# Patient Record
Sex: MALE | Race: WHITE | Employment: OTHER | RURAL
[De-identification: names, ages, dates, MRNs, and addresses within clinical notes are randomized per-mention and may not be internally consistent; named-entity substitution may affect disease eponyms.]

---

## 2017-01-12 RX ORDER — CARVEDILOL 3.12 MG/1
TABLET ORAL
Qty: 180 TAB | Refills: 3 | Status: SHIPPED | OUTPATIENT
Start: 2017-01-12 | End: 2018-02-13 | Stop reason: SDUPTHER

## 2017-01-12 RX ORDER — TRANDOLAPRIL 2 MG/1
TABLET ORAL
Qty: 90 TAB | Refills: 3 | Status: SHIPPED | OUTPATIENT
Start: 2017-01-12 | End: 2018-02-06 | Stop reason: SDUPTHER

## 2017-02-13 RX ORDER — SIMVASTATIN 20 MG/1
TABLET, FILM COATED ORAL
Qty: 90 TAB | Refills: 2 | Status: SHIPPED | OUTPATIENT
Start: 2017-02-13 | End: 2017-11-10 | Stop reason: SDUPTHER

## 2017-04-21 RX ORDER — LANCETS 28 GAUGE
EACH MISCELLANEOUS
Qty: 100 LANCET | Refills: 4 | Status: SHIPPED | OUTPATIENT
Start: 2017-04-21

## 2017-08-14 ENCOUNTER — OFFICE VISIT (OUTPATIENT)
Dept: FAMILY MEDICINE CLINIC | Age: 77
End: 2017-08-14

## 2017-08-14 VITALS
HEART RATE: 60 BPM | RESPIRATION RATE: 16 BRPM | TEMPERATURE: 98.2 F | DIASTOLIC BLOOD PRESSURE: 63 MMHG | HEIGHT: 66 IN | BODY MASS INDEX: 29.99 KG/M2 | SYSTOLIC BLOOD PRESSURE: 130 MMHG | OXYGEN SATURATION: 97 % | WEIGHT: 186.6 LBS

## 2017-08-14 DIAGNOSIS — E78.00 PURE HYPERCHOLESTEROLEMIA: Chronic | ICD-10-CM

## 2017-08-14 DIAGNOSIS — I25.5 ISCHEMIC CARDIOMYOPATHY: Chronic | ICD-10-CM

## 2017-08-14 DIAGNOSIS — E03.9 ACQUIRED HYPOTHYROIDISM: Chronic | ICD-10-CM

## 2017-08-14 DIAGNOSIS — E11.9 TYPE 2 DIABETES MELLITUS WITHOUT COMPLICATION, WITHOUT LONG-TERM CURRENT USE OF INSULIN (HCC): Primary | Chronic | ICD-10-CM

## 2017-08-14 DIAGNOSIS — E08.3293 MILD NONPROLIFERATIVE DIABETIC RETINOPATHY OF BOTH EYES WITHOUT MACULAR EDEMA ASSOCIATED WITH DIABETES MELLITUS DUE TO UNDERLYING CONDITION (HCC): Chronic | ICD-10-CM

## 2017-08-14 NOTE — PROGRESS NOTES
Chief Complaint   Patient presents with    Labs     Fasting    Hypertension    Thyroid Problem     Body mass index is 30.12 kg/(m^2). Reviewed record in preparation for visit and have necessary documentation  Pt did not bring medication to office visit for review  Information was given to pt on Advanced Directives, Living Will  Information was given on Shingles Vaccine  Opportunity was given for questions  Goals that were addressed and/or need to be completed after this appointment include:     Health Maintenance Due   Topic Date Due    FOOT EXAM Q1  04/20/2017    HEMOGLOBIN A1C Q6M  04/26/2017    INFLUENZA AGE 9 TO ADULT  08/01/2017     - check for functional glucose monitor and record keeping system: Yes, checks sugars once daily  Pt was given BS record log to document home readings and return to office for review  Diabetic Bundle:  LDL-71  A1C-5.8  BP-130/63  Smoking? No  Anticoagulation medication? Yes  Eye exam dilated?  Good  Foot exam? Due

## 2017-08-14 NOTE — MR AVS SNAPSHOT
Visit Information Date & Time Provider Department Dept. Phone Encounter #  
 8/14/2017 10:10 AM Wilberto Low MD Joanne Vitale [id number] Follow-up Instructions Return in about 4 months (around 12/14/2017). Your Appointments 8/14/2017 10:10 AM  
ROUTINE CARE with Wilberto Low MD  
[address]) Appt Note: 6 mo f/u  
 [address] [place] [id number]  
[phone]  
  
   
 [address] [place] [address]  
  
    
 11/28/2017  1:25 PM  
Medicare Physical with Wilberto Low MD  
[facility] ([address]) Appt Note: -letter mailed [address] [place] [id number]  
[place] [address] [place] [id number] Upcoming Health Maintenance Date Due  
 FOOT EXAM Q1 4/20/2017 HEMOGLOBIN A1C Q6M 4/26/2017 INFLUENZA AGE 9 TO ADULT 8/1/2017 MICROALBUMIN Q1 10/26/2017 LIPID PANEL Q1 10/26/2017 MEDICARE YEARLY EXAM 11/19/2017 EYE EXAM RETINAL OR DILATED Q1 12/19/2017 GLAUCOMA SCREENING Q2Y 12/19/2018 DTaP/Tdap/Td series (2 - Td) 5/17/2021 COLONOSCOPY 12/12/2021 Allergies as of 8/14/2017  Review Complete On: 8/14/2017 By: Autumn Scales, LPN Severity Noted Reaction Type Reactions Pcn [Penicillins]  04/05/2010    Rash, Swelling Robitussin [Guaifenesin]  04/05/2010    Hives Current Immunizations  Reviewed on 12/15/2015 Name Date H1N1 FLU VACCINE 1/20/2010 Influenza Vaccine 10/9/2015, 11/17/2014, 9/23/2013 Influenza Vaccine (Quad) PF 9/26/2016 Influenza Vaccine Split 11/2/2012, 10/25/2011, 12/8/2009 Pneumococcal Conjugate (PCV-13) 8/13/2015 TD Vaccine 8/17/2001 TDAP Vaccine 5/17/2011 ZZZ-RETIRED (DO NOT USE) Pneumococcal Vaccine (Unspecified Type) 12/5/2007, 12/4/1996 Zoster Vaccine, Live 7/26/2013 Not reviewed this visit You Were Diagnosed With   
  
 Codes Comments Type 2 diabetes mellitus without complication, without long-term current use of insulin (HCC)    -  Primary ICD-10-CM: E11.9 ICD-9-CM: 250.00 Mild nonproliferative diabetic retinopathy of both eyes without macular edema associated with diabetes mellitus due to underlying condition ([facility]     ICD-10-CM: H07.3440 ICD-9-CM: 249.50, 362.04 Acquired hypothyroidism     ICD-10-CM: E03.9 ICD-9-CM: 244.9 Pure hypercholesterolemia     ICD-10-CM: E78.00 ICD-9-CM: 272.0 Ischemic cardiomyopathy     ICD-10-CM: I25.5 ICD-9-CM: 414.8 Vitals BP Pulse Temp Resp Height(growth percentile) Weight(growth percentile) 130/63 (BP 1 Location: Left arm, BP Patient Position: Sitting) 60 98.2 °F (36.8 °C) (Oral) 16 5' 6\" (1.676 m) 186 lb 9.6 oz (84.6 kg) SpO2 BMI Smoking Status 97% 30.12 kg/m2 Never Smoker Vitals History BMI and BSA Data Body Mass Index Body Surface Area  
 30.12 kg/m 2 1.98 m 2 Preferred Pharmacy Pharmacy Name Phone [address] [facility] [phone] Your Updated Medication List  
  
   
This list is accurate as of: 8/14/17  9:37 AM.  Always use your most recent med list.  
  
  
  
  
 aspirin delayed-release 81 mg tablet Take  by mouth daily. B COMPLEX PO Take  by mouth. carvedilol 3.125 mg tablet Commonly known as:  COREG  
TAKE 1 TABLET TWICE A DAY WITH MEALS FISH OIL 1,200-144-216 mg Cap Generic drug:  fish oil-dha-epa Take  by mouth. FREESTYLE LANCETS 28 gauge Misc Generic drug:  lancets USE TO TEST DAILY  
  
 glucose blood VI test strips strip Commonly known as:  FREESTYLE LITE STRIPS Dx E11.9  testing once a day  
  
 levothyroxine 50 mcg tablet Commonly known as:  SYNTHROID  
TAKE 1 TABLET DAILY BEFORE BREAKFAST  
  
 M-VIT PO  
 Take  by mouth. simvastatin 20 mg tablet Commonly known as:  ZOCOR  
TAKE 1 TABLET NIGHTLY  
  
 trandolapril 2 mg tablet Commonly known as:  MAVIK  
TAKE 1 TABLET NIGHTLY FOR LEFT VENTRICULAR DYSFUNCTION FOLLOWING MI  
  
 VITAMIN D3 1,000 unit Cap Generic drug:  cholecalciferol Take  by mouth daily. We Performed the Following HEMOGLOBIN A1C WITH EAG [14112 CPT(R)] HEMOGLOBIN [id number] CPT(R)]  DIABETES FOOT EXAM [HM7 Custom] LIPID PANEL [47496 CPT(R)] METABOLIC PANEL, COMPREHENSIVE [82045 CPT(R)] MICROALBUMIN, UR, RAND W/ MICROALBUMIN/CREA RATIO M5909796 CPT(R)] T4, FREE B4919961 CPT(R)] TSH 3RD GENERATION [96994 CPT(R)] Follow-up Instructions Return in about 4 months (around 12/14/2017). Patient Instructions Learning About Diabetes Food Guidelines Your Care Instructions Meal planning is important to manage diabetes. It helps keep your blood sugar at a target level (which you set with your doctor). You don't have to eat special foods. You can eat what your family eats, including sweets once in a while. But you do have to pay attention to how often you eat and how much you eat of certain foods. You may want to work with a dietitian or a certified diabetes educator (CDE) to help you plan meals and snacks. A dietitian or CDE can also help you lose weight if that is one of your goals. What should you know about eating carbs? Managing the amount of carbohydrate (carbs) you eat is an important part of healthy meals when you have diabetes. Carbohydrate is found in many foods. · Learn which foods have carbs. And learn the amounts of carbs in different foods. ¨ Bread, cereal, pasta, and rice have about 15 grams of carbs in a serving. A serving is 1 slice of bread (1 ounce), ½ cup of cooked cereal, or 1/3 cup of cooked pasta or rice. ¨ Fruits have 15 grams of carbs in a serving.  A serving is 1 small fresh fruit, such as an apple or orange; ½ of a banana; ½ cup of cooked or canned fruit; ½ cup of fruit juice; 1 cup of melon or raspberries; or 2 tablespoons of dried fruit. ¨ Milk and no-sugar-added yogurt have 15 grams of carbs in a serving. A serving is 1 cup of milk or 2/3 cup of no-sugar-added yogurt. ¨ Starchy vegetables have 15 grams of carbs in a serving. A serving is ½ cup of mashed potatoes or sweet potato; 1 cup winter squash; ½ of a small baked potato; ½ cup of cooked beans; or ½ cup cooked corn or green peas. · Learn how much carbs to eat each day and at each meal. A dietitian or CDE can teach you how to keep track of the amount of carbs you eat. This is called carbohydrate counting. · If you are not sure how to count carbohydrate grams, use the Plate Method to plan meals. It is a good, quick way to make sure that you have a balanced meal. It also helps you spread carbs throughout the day. ¨ Divide your plate by types of foods. Put non-starchy vegetables on half the plate, meat or other protein food on one-quarter of the plate, and a grain or starchy vegetable in the final quarter of the plate. To this you can add a small piece of fruit and 1 cup of milk or yogurt, depending on how many carbs you are supposed to eat at a meal. 
· Try to eat about the same amount of carbs at each meal. Do not \"save up\" your daily allowance of carbs to eat at one meal. 
· Proteins have very little or no carbs per serving. Examples of proteins are beef, chicken, turkey, fish, eggs, tofu, cheese, cottage cheese, and peanut butter. A serving size of meat is 3 ounces, which is about the size of a deck of cards. Examples of meat substitute serving sizes (equal to 1 ounce of meat) are 1/4 cup of cottage cheese, 1 egg, 1 tablespoon of peanut butter, and ½ cup of tofu. How can you eat out and still eat healthy? · Learn to estimate the serving sizes of foods that have carbohydrate.  If you measure food at home, it will be easier to estimate the amount in a serving of restaurant food. · If the meal you order has too much carbohydrate (such as potatoes, corn, or baked beans), ask to have a low-carbohydrate food instead. Ask for a salad or green vegetables. · If you use insulin, check your blood sugar before and after eating out to help you plan how much to eat in the future. · If you eat more carbohydrate at a meal than you had planned, take a walk or do other exercise. This will help lower your blood sugar. What else should you know? · Limit saturated fat, such as the fat from meat and dairy products. This is a healthy choice because people who have diabetes are at higher risk of heart disease. So choose lean cuts of meat and nonfat or low-fat dairy products. Use olive or canola oil instead of butter or shortening when cooking. · Don't skip meals. Your blood sugar may drop too low if you skip meals and take insulin or certain medicines for diabetes. · Check with your doctor before you drink alcohol. Alcohol can cause your blood sugar to drop too low. Alcohol can also cause a bad reaction if you take certain diabetes medicines. Follow-up care is a key part of your treatment and safety. Be sure to make and go to all appointments, and call your doctor if you are having problems. It's also a good idea to know your test results and keep a list of the medicines you take. Where can you learn more? Go to http://mabel-maliha.info/. Enter Z669 in the search box to learn more about \"Learning About Diabetes Food Guidelines. \" Current as of: March 13, 2017 Content Version: 11.3 © 1057-4889 [employer]wise, [employer]orporated. Care instructions adapted under license by [employer] (which disclaims liability or warranty for this information).  If you have questions about a medical condition or this instruction, always ask your healthcare professional. [employer] disclaims any warranty or liability for your use of this information. Introducing [facility]! Franco Candelaria introduces [employer] patient portal. Now you can access parts of your medical record, email your doctor's office, and request medication refills online. 1. In your internet browser, go to [email or web address] 2. Click on the First Time User? Click Here link in the Sign In box. You will see the New Member Sign Up page. 3. Enter your [employer] Access Code exactly as it appears below. You will not need to use this code after youve completed the sign-up process. If you do not sign up before the expiration date, you must request a new code. · [employer] Access Code: [email or web address] Expires: 11/12/2017  9:30 AM 
 
4. Enter the last four digits of your Social Security Number (xxxx) and Date of Birth (mm/dd/yyyy) as indicated and click Submit. You will be taken to the next sign-up page. 5. Create a [employer] ID. This will be your [employer] login ID and cannot be changed, so think of one that is secure and easy to remember. 6. Create a [employer] password. You can change your password at any time. 7. Enter your Password Reset Question and Answer. This can be used at a later time if you forget your password. 8. Enter your e-mail address. You will receive e-mail notification when new information is available in [address] 9. Click Sign Up. You can now view and download portions of your medical record. 10. Click the Download Summary menu link to download a portable copy of your medical information. If you have questions, please visit the Frequently Asked Questions section of the [employer] website. Remember, [employer] is NOT to be used for urgent needs. For medical emergencies, dial 911. Now available from your iPhone and Android! Please provide this summary of care documentation to your next provider. Your primary care clinician is listed as [address]. If you have any questions after today's visit, please call [phone].

## 2017-08-14 NOTE — PATIENT INSTRUCTIONS

## 2017-08-15 LAB
ALBUMIN SERPL-MCNC: 4.8 G/DL (ref 3.5–4.8)
ALBUMIN/CREAT UR: 5.7 MG/G CREAT (ref 0–30)
ALBUMIN/GLOB SERPL: 2.1 {RATIO} (ref 1.2–2.2)
ALP SERPL-CCNC: 51 IU/L (ref 39–117)
ALT SERPL-CCNC: 18 IU/L (ref 0–44)
AST SERPL-CCNC: 22 IU/L (ref 0–40)
BILIRUB SERPL-MCNC: 0.8 MG/DL (ref 0–1.2)
BUN SERPL-MCNC: 14 MG/DL (ref 8–27)
BUN/CREAT SERPL: 14 (ref 10–24)
CALCIUM SERPL-MCNC: 9.7 MG/DL (ref 8.6–10.2)
CHLORIDE SERPL-SCNC: 99 MMOL/L (ref 96–106)
CHOLEST SERPL-MCNC: 126 MG/DL (ref 100–199)
CO2 SERPL-SCNC: 25 MMOL/L (ref 18–29)
CREAT SERPL-MCNC: 0.98 MG/DL (ref 0.76–1.27)
CREAT UR-MCNC: 94.3 MG/DL
EST. AVERAGE GLUCOSE BLD GHB EST-MCNC: 128 MG/DL
GLOBULIN SER CALC-MCNC: 2.3 G/DL (ref 1.5–4.5)
GLUCOSE SERPL-MCNC: 107 MG/DL (ref 65–99)
HBA1C MFR BLD: 6.1 % (ref 4.8–5.6)
HDLC SERPL-MCNC: 46 MG/DL
HGB BLD-MCNC: 15.1 G/DL (ref 12.6–17.7)
LDLC SERPL CALC-MCNC: 64 MG/DL (ref 0–99)
Lab: NORMAL
MICROALBUMIN UR-MCNC: 5.4 UG/ML
POTASSIUM SERPL-SCNC: 4.5 MMOL/L (ref 3.5–5.2)
PROT SERPL-MCNC: 7.1 G/DL (ref 6–8.5)
SODIUM SERPL-SCNC: 140 MMOL/L (ref 134–144)
T4 FREE SERPL-MCNC: 1.34 NG/DL (ref 0.82–1.77)
TRIGL SERPL-MCNC: 78 MG/DL (ref 0–149)
TSH SERPL DL<=0.005 MIU/L-ACNC: 2.22 UIU/ML (ref 0.45–4.5)
VLDLC SERPL CALC-MCNC: 16 MG/DL (ref 5–40)

## 2017-08-15 NOTE — PROGRESS NOTES
Progress Note    Patient: Yaw Brush MRN: [id number]  SSN: [id number]    YOB: 1940  Age: 77 y.o. Sex: male        Chief Complaint   Patient presents with    Labs     Fasting    Hypertension    Thyroid Problem         Subjective:     Encounter Diagnoses   Name Primary?  Type 2 diabetes mellitus without complication, without long-term current use of insulin ([facility]): This patient is managed under a comprehensive plan of care for Diabetes. Overall the patient feels well with good energy level. Pertinent Labs:   Lab Results   Component Value Date/Time    Hemoglobin A1c 5.8 10/26/2016 09:34 AM    Hemoglobin A1c 5.8 04/20/2016 08:41 AM    Hemoglobin A1c 5.8 12/15/2015 11:58 AM      Body mass index is 30.12 kg/(m^2). Lab Results   Component Value Date/Time    LDL, calculated 71 10/26/2016 09:34 AM         Lab Results   Component Value Date/Time    Sodium 138 10/26/2016 09:34 AM    Potassium 4.5 10/26/2016 09:34 AM    Chloride 97 10/26/2016 09:34 AM    CO2 26 10/26/2016 09:34 AM    Anion gap 10 10/06/2010 08:58 AM    Glucose 120 10/26/2016 09:34 AM    BUN 19 10/26/2016 09:34 AM    Creatinine 1.05 10/26/2016 09:34 AM    BUN/Creatinine ratio 18 10/26/2016 09:34 AM    GFR est AA 80 10/26/2016 09:34 AM    GFR est non-AA 69 10/26/2016 09:34 AM    Calcium 9.5 10/26/2016 09:34 AM    AST (SGOT) 21 10/26/2016 09:34 AM    Alk. phosphatase 58 10/26/2016 09:34 AM    Protein, total 6.9 10/26/2016 09:34 AM    Albumin 4.6 10/26/2016 09:34 AM    Globulin 2.8 10/06/2010 08:58 AM    A-G Ratio 2.0 10/26/2016 09:34 AM    ALT (SGPT) 23 10/26/2016 09:34 AM     Lab Results   Component Value Date/Time    Microalbumin/Creat ratio (mg/g creat) 13 12/08/2009 11:22 AM    Microalb/Creat ratio (ug/mg creat.) 9.9 10/26/2016 09:34 AM    Microalbumin,urine random 1.72 12/08/2009 11:22 AM      Frequency of home glucose testing:daily   Blood Sugar range at home: Usually less than 120   Last eye exam: In past 12 months.    Last foot exam: This year. Polyuria, polyphagia or polydipsia: No   Retinopathy: yes   Neuropathy SX: No    Low blood sugar symptoms: No   Dietary compliance: Good   Medication compliance:Good   On ASA: Yes   Depression: No   CKD:no     Wt Readings from Last 3 Encounters:   08/14/17 186 lb 9.6 oz (84.6 kg)   11/18/16 186 lb (84.4 kg)   10/26/16 187 lb 9.6 oz (85.1 kg)        History   Smoking Status    Never Smoker   Smokeless Tobacco    Never Used       Diabetic Consultants: All the patient's questions regarding medications, diet and exercise were answered. Goal of A1C of less than 7.5% is our goal.   Our overall goal is to reduce or eliminate the long term consequences of poorly controlled diabetes. Yes    Mild nonproliferative diabetic retinopathy of both eyes without macular edema associated with diabetes mellitus due to underlying condition ([address]):  Stable by history.  Acquired hypothyroidism:  Lab Results   Component Value Date/Time    TSH 3.660 10/26/2016 09:34 AM    T4, Free 1.32 04/20/2016 08:41 AM      Denies fatigue, nervousness,weight changes, heat orcold intolerance, bowel changes,skin changes, cardiovascular symptoms, hair loss, feeling excessive energy, tremor, palpitations and weight loss. Thyroid medication has been unchanged since last medication check and labs.  Pure hypercholesterolemia:  Cardiovascular risks for him are: LDL goal is under 80. Currently he takes Zocor (simvastatin) , 20 mg And fish oil. Lab Results   Component Value Date/Time    Cholesterol, total 130 10/26/2016 09:34 AM    HDL Cholesterol 42 10/26/2016 09:34 AM    LDL, calculated 71 10/26/2016 09:34 AM    Triglyceride 86 10/26/2016 09:34 AM    CHOL/HDL Ratio 3.0 10/06/2010 08:58 AM     Lab Results   Component Value Date/Time    ALT (SGPT) 23 10/26/2016 09:34 AM    AST (SGOT) 21 10/26/2016 09:34 AM    Alk.  phosphatase 58 10/26/2016 09:34 AM    Bilirubin, total 0.7 10/26/2016 09:34 AM      Myalgias: No   Fatigue: No   Other side effects: no  Wt Readings from Last 3 Encounters:   08/14/17 186 lb 9.6 oz (84.6 kg)   11/18/16 186 lb (84.4 kg)   10/26/16 187 lb 9.6 oz (85.1 kg)     The patient is aware of our goal to reduce or eliminate the long term problems (such as strokes and heart attacks) related to poorly controlled hyperlipidemia.  Ischemic cardiomyopathy:Ejection fraction 2015 was 30 to 35%. He paces himself and has no chest pain or symptoms of congestive heart failure. Current and past medical information:    Current Medications after this visit[de-identified]   Current Outpatient Prescriptions   Medication Sig    FREESTYLE LANCETS 28 gauge misc USE TO TEST DAILY    simvastatin (ZOCOR) 20 mg tablet TAKE 1 TABLET NIGHTLY    trandolapril (MAVIK) 2 mg tablet TAKE 1 TABLET NIGHTLY FOR LEFT VENTRICULAR DYSFUNCTION FOLLOWING MI    carvedilol (COREG) 3.125 mg tablet TAKE 1 TABLET TWICE A DAY WITH MEALS    glucose blood VI test strips (FREESTYLE LITE STRIPS) strip Dx E11.9  testing once a day    levothyroxine (SYNTHROID) 50 mcg tablet TAKE 1 TABLET DAILY BEFORE BREAKFAST    aspirin delayed-release 81 mg tablet Take  by mouth daily.  Cholecalciferol, Vitamin D3, (VITAMIN D3) 1,000 unit cap Take  by mouth daily.  fish oil-dha-epa (FISH OIL) 1,200-144-216 mg Cap Take  by mouth.  VITAMIN B COMPLEX (B COMPLEX PO) Take  by mouth.  PV W-O MIKO/FERROUS FUMARATE/FA (M-VIT PO) Take  by mouth. No current facility-administered medications for this visit.         Patient Active Problem List    Diagnosis Date Noted    Type 2 diabetes, controlled, with retinopathy ([address] 12/15/2015    Mild 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 edema associated with diabetes mellitus due to underlying condition ([address] 12/15/2015    Prostate cancer ([facility] 12/15/2015    Ischemic cardiomyopathy 01/06/2015    Congestive heart failure stage B 05/15/2014    [facility] spotted fever)     Asymptomatic PVCs 08/21/2013    T2DM (type 2 diabetes mellitus) ([facility] 08/21/2013    Hypothyroidism 09/05/2012    Mild nonproliferative diabetic retinopathy ([facility] 07/19/2010    Colon polyps 05/31/2010       Past Medical History:   Diagnosis Date    Cardiomyopathy, dilated, nonischemic ([facility]     Colon polyps     Hypercholesterolemia     Prostate cancer ([facility]     [address], watch and wait strategy    [facility])     T2DM (type 2 diabetes mellitus) ([facility] 8/21/2013       Allergies   Allergen Reactions    Pcn [Penicillins] Rash and Swelling    Robitussin [Guaifenesin] Hives       Past Surgical History:   Procedure Laterality Date    CARDIAC SURG PROCEDURE UNLIST      HX HERNIA REPAIR      left inguinal x 2       Social History     Social History    Marital status:      Spouse name: N/A    Number of children: N/A    Years of education: N/A     Social History Main Topics    Smoking status: Never Smoker    Smokeless tobacco: Never Used    Alcohol use No    Drug use: No    Sexual activity: Not Asked     Other Topics Concern    None     Social History Narrative       Review of Systems   Constitutional: Negative. Negative for chills, fever, malaise/fatigue and weight loss. HENT: Negative. Negative for hearing loss. Eyes: Negative. Negative for blurred vision and double vision. Respiratory: Negative. Negative for cough, hemoptysis, sputum production and shortness of breath. Cardiovascular: Negative. Negative for chest pain, palpitations and orthopnea. Gastrointestinal: Negative. Negative for abdominal pain, blood in stool, heartburn, nausea and vomiting. Genitourinary: Negative. Negative for dysuria, frequency and urgency. Musculoskeletal: Negative. Negative for back pain, myalgias and neck pain. Skin: Negative. Negative for rash. Neurological: Negative. Negative for dizziness, tingling, tremors, weakness and headaches.    Endo/Heme/Allergies: Negative. Psychiatric/Behavioral: Negative. Negative for depression. Objective:     Vitals:    08/14/17 0913   BP: 130/63   Pulse: 60   Resp: 16   Temp: 98.2 °F (36.8 °C)   TempSrc: Oral   SpO2: 97%   Weight: 186 lb 9.6 oz (84.6 kg)   Height: 5' 6\" (1.676 m)      Body mass index is 30.12 kg/(m^2). Physical Exam   Constitutional: He is oriented to person, place, and time and well-developed, well-nourished, and in no distress. HENT:   Head: Normocephalic and atraumatic. Mouth/Throat: Oropharynx is clear and moist.   Eyes: Right eye exhibits no discharge. Left eye exhibits no discharge. No scleral icterus. Neck: No tracheal deviation present. No thyromegaly present. No bruit. Cardiovascular: Normal rate, regular rhythm and normal heart sounds. Pulmonary/Chest: Effort normal and breath sounds normal.   Abdominal: Soft. Neurological: He is alert and oriented to person, place, and time. Diabetic foot exam:  Sensation: normal to monofilament testing. Calluses or corns: no  Pulses: intact  Fungal nails: no     Skin: No rash noted. No erythema. Psychiatric: Mood and affect normal.   Nursing note and vitals reviewed. Health Maintenance Due   Topic Date Due    FOOT EXAM Q1  04/20/2017    HEMOGLOBIN A1C Q6M  04/26/2017    INFLUENZA AGE 9 TO ADULT  08/01/2017         Assessment and orders:       ICD-10-CM ICD-9-CM    1. Type 2 diabetes mellitus without complication, without long-term current use of insulin (HCC)diabetic -Controlled by history. E11.9 250.00 HEMOGLOBIN A1C WITH EAG      MICROALBUMIN, UR, RAND W/ MICROALBUMIN/CREA RATIO      HEMOGLOBIN      HM DIABETES FOOT EXAM   2. Mild nonproliferative retinopathy of both eyes without macular edema associated with diabetes mellitus due to underlying condition (HCC)-Stable by history W67.3380 249.50 HEMOGLOBIN A1C WITH EAG     362.04    3.  Acquired hypothyroidism-No symptoms   E03.9 244.9    4. Pure hypercholesterolemia-Retest E78.00 272.0 LIPID PANEL      METABOLIC PANEL, COMPREHENSIVE      T4, FREE      TSH 3RD GENERATION   5. Ischemic cardiomyopathy-Will ask him to see Dr. Mauricia Goldberg again I25.5 414.8          Plan of care:  Discussed diagnoses in detail with patient. Medication risks/benefits/side effects discussed with patient. All of the patient's questions were addressed. The patient understands and agrees with our plan of care. The patient knows to call back if they are unsure of or forget any changes we discussed today or if the symptoms change. The patient received an After-Visit Summary which contains VS, orders, medication list and allergy list. This can be used as a \"mini-medical record\" should they have to seek medical care while out of town. Patient Care Team:  Michelle Wood MD as PCP - Juan Luis Watson MD (Urology)    Follow-up Disposition:  Return in about 4 months (around 12/14/2017).     Future Appointments  Date Time Provider Sarina Mercedesi   11/28/2017 1:25 PM Michelle Wood MD [facility] [place] SCHED   12/15/2017 8:20 AM Michelle Wood MD [facility] [place] SCHED       Signed By: Michelle Wood MD     August 14, 2017

## 2017-09-20 ENCOUNTER — CLINICAL SUPPORT (OUTPATIENT)
Dept: FAMILY MEDICINE CLINIC | Age: 77
End: 2017-09-20

## 2017-09-20 VITALS — TEMPERATURE: 97.6 F

## 2017-09-20 DIAGNOSIS — Z23 ENCOUNTER FOR IMMUNIZATION: Primary | ICD-10-CM

## 2017-09-20 NOTE — PROGRESS NOTES
patient presents for routine immunizations. patient denies any symptoms , reactions or allergies that would exclude them from being immunized today. Risks and adverse reactions were discussed and the VIS was given to them. All questions were addressed. There were no reactions observed.

## 2017-11-10 RX ORDER — SIMVASTATIN 20 MG/1
TABLET, FILM COATED ORAL
Qty: 90 TAB | Refills: 2 | Status: SHIPPED | OUTPATIENT
Start: 2017-11-10 | End: 2018-08-08 | Stop reason: SDUPTHER

## 2017-11-28 ENCOUNTER — OFFICE VISIT (OUTPATIENT)
Dept: FAMILY MEDICINE CLINIC | Age: 77
End: 2017-11-28

## 2017-11-28 VITALS
BODY MASS INDEX: 30.22 KG/M2 | HEART RATE: 54 BPM | WEIGHT: 188 LBS | SYSTOLIC BLOOD PRESSURE: 110 MMHG | OXYGEN SATURATION: 95 % | TEMPERATURE: 96.5 F | HEIGHT: 66 IN | RESPIRATION RATE: 20 BRPM | DIASTOLIC BLOOD PRESSURE: 70 MMHG

## 2017-11-28 DIAGNOSIS — Z13.39 ALCOHOL SCREENING: ICD-10-CM

## 2017-11-28 DIAGNOSIS — Z00.00 MEDICARE ANNUAL WELLNESS VISIT, SUBSEQUENT: Primary | ICD-10-CM

## 2017-11-28 DIAGNOSIS — Z13.31 DEPRESSION SCREENING: ICD-10-CM

## 2017-11-28 NOTE — MR AVS SNAPSHOT
Visit Information Date & Time Provider Department Dept. Phone Encounter #  
 11/28/2017  1:25 PM Tae Mariscal [facility]  [facility] [id number] Follow-up Instructions Return in about 1 month (around 12/28/2017) for routine visit and labs. Your Appointments 12/15/2017  8:20 AM  
ROUTINE CARE with Tae Mariscal MD  
[address]) Appt Note: 4 mo f/u-Diabetes [address] [facility] [address] [address]  
[place] [address] [place] [id number] Upcoming Health Maintenance Date Due  
 MEDICARE YEARLY EXAM 11/19/2017 EYE EXAM RETINAL OR DILATED Q1 12/19/2017 HEMOGLOBIN A1C Q6M 2/14/2018 FOOT EXAM Q1 8/14/2018 MICROALBUMIN Q1 8/14/2018 LIPID PANEL Q1 8/14/2018 GLAUCOMA SCREENING Q2Y 12/19/2018 DTaP/Tdap/Td series (2 - Td) 5/17/2021 COLONOSCOPY 12/12/2021 Allergies as of 11/28/2017  Review Complete On: 8/14/2017 By: Tae Mariscal MD  
  
 Severity Noted Reaction Type Reactions Pcn [Penicillins]  04/05/2010    Rash, Swelling Robitussin [Guaifenesin]  04/05/2010    Hives Current Immunizations  Reviewed on 12/15/2015 Name Date H1N1 FLU VACCINE 1/20/2010 Influenza High Dose Vaccine PF 9/20/2017 Influenza Vaccine 10/9/2015, 11/17/2014, 9/23/2013 Influenza Vaccine (Quad) PF 9/26/2016 Influenza Vaccine Split 11/2/2012, 10/25/2011, 12/8/2009 Pneumococcal Conjugate (PCV-13) 8/13/2015 TD Vaccine 8/17/2001 TDAP Vaccine 5/17/2011 ZZZ-RETIRED (DO NOT USE) Pneumococcal Vaccine (Unspecified Type) 12/5/2007, 12/4/1996 Zoster Vaccine, Live 7/26/2013 Not reviewed this visit Vitals BP Pulse Temp Resp Height(growth percentile) Weight(growth percentile) 110/70 (!) 54 96.5 °F (35.8 °C) (Oral) 20 5' 6\" (1.676 m) 188 lb (85.3 kg) SpO2 BMI Smoking Status 95% 30.34 kg/m2 Never Smoker Vitals History BMI and BSA Data Body Mass Index Body Surface Area  
 30.34 kg/m 2 1.99 m 2 Preferred Pharmacy Pharmacy Name Phone [address] [phone] Your Updated Medication List  
  
   
This list is accurate as of: 11/28/17  1:36 PM.  Always use your most recent med list.  
  
  
  
  
 aspirin delayed-release 81 mg tablet Take  by mouth daily. B COMPLEX PO Take  by mouth. carvedilol 3.125 mg tablet Commonly known as:  COREG  
TAKE 1 TABLET TWICE A DAY WITH MEALS FISH OIL 1,200-144-216 mg Cap Generic drug:  fish oil-dha-epa Take  by mouth. FREESTYLE LANCETS 28 gauge Misc Generic drug:  lancets USE TO TEST DAILY  
  
 glucose blood VI test strips strip Commonly known as:  FREESTYLE LITE STRIPS Dx E11.9  testing once a day  
  
 levothyroxine 50 mcg tablet Commonly known as:  SYNTHROID  
TAKE 1 TABLET DAILY BEFORE BREAKFAST  
  
 M-VIT PO Take  by mouth. simvastatin 20 mg tablet Commonly known as:  ZOCOR  
TAKE 1 TABLET NIGHTLY  
  
 trandolapril 2 mg tablet Commonly known as:  MAVIK  
TAKE 1 TABLET NIGHTLY FOR LEFT VENTRICULAR DYSFUNCTION FOLLOWING MI  
  
 VITAMIN D3 1,000 unit Cap Generic drug:  cholecalciferol Take  by mouth daily. Follow-up Instructions Return in about 1 month (around 12/28/2017) for routine visit and labs. Patient Instructions Medicare Part B Preventive Services Guidelines/Limitations Date last completed and Frequency Due Date Bone Mass Measurement 
(age 72 & older, biennial) Requires diagnosis related to osteoporosis or estrogen deficiency. Biennial benefit unless patient has history of long-term glucocorticoid tx or baseline is needed because initial test was by other method Recommended every 2 years As recommended by your PCP or Specialist 
  
 Cardiovascular Screening Blood Tests (every 5 years) Total cholesterol, HDL, Triglycerides Order as a panel if possible Completed 8/14/17 As recommended by your PCP As recommended by your PCP or Specialist  
Colorectal Cancer Screening 
-Fecal occult blood test (annual) -Flexible sigmoidoscopy (5y) 
-Screening colonoscopy (10y) -Barium Enema Age 49-80; After age [de-identified] if history of abnormal results Completed 12/12/11 Recommended every 5 to 10 years  As recommended by your PCP or Specialist 
  
Counseling to Prevent Tobacco Use (up to 8 sessions per year) - Counseling greater than 3 and up to 10 minutes - Counseling greater than 10 minutes Patients must be asymptomatic of tobacco-related conditions to receive as preventive service N/A N/A Diabetes Screening Tests (at least every 3 years, Medicare covers annually or at 6-month intervals for prediabetic patients) Fasting blood sugar (FBS) or glucose tolerance test (GTT) Patient must be diagnosed with one of the following: 
-Hypertension, Dyslipidemia, obesity, previous impaired FBS or GTT 
Or any two of the following: overweight, FH of diabetes, age ? 72, history of gestational diabetes, birth of baby weighing more than 9 pounds Completed 8/14/17 Recommended every 3 years for non-diabetics Recommended every 3-6 months for Pre-Diabetics and Diabetics As recommended by your PCP or Specialist 
  
Glaucoma Screening (no USPSTF recommendation) Diabetes mellitus, family history, , age 48 or over,  American, age 72 or over Completed Recommended annually As recommended by your PCP or Specialist  
Prostate Cancer Screening (annually up to age 76) - Digital rectal exam (ADONIS) - Prostate specific antigen (PSA) Annually (age 48 or over), ADONIS not paid separately when covered E/M service is provided on same date Recommended annually to age 76 As recommended by your PCP or Specialist 
  
 Seasonal Influenza Vaccination (annually)  Completed 9/20/17 Recommended Annually Due Fall 2017 TDAP Vaccination  Completed 5/17/11 Recommended every 10 years As recommended by your PCP or Specialist  
Zoster (Shingles) Vaccination Covered by Medicare Part D through the pharmacy- PCP provides prescription Completed 7/26/13 Recommended once over age 48  Complete Pneumococcal Vaccination (once after 65)  Pneumo 23- Recommended once over the age of 72 Prevnar 13-  Recommended once over the age of 72 Completed 8/13/17 Ultrasound Screening for Abdominal Aortic Aneurysm (AAA) (once) Patient must be referred through IPPE and not have had a screening for abdominal aortic aneurysm before under Medicare. Limited to patients who meet one of the following criteria: 
- Men who are 73-68 years old and have smoked more than 100 cigarettes in their lifetime. 
-Anyone with a FH of AAA 
-Anyone recommended for screening by USPSTF Not indicated unless recommended by PCP Not indicated unless recommended by PCP Family Practice Management 2011 Introducing [facility]! [employer] introduces [employer] patient portal. Now you can access parts of your medical record, email your doctor's office, and request medication refills online. 1. In your internet browser, go to [email or web address] 2. Click on the First Time User? Click Here link in the Sign In box. You will see the New Member Sign Up page. 3. Enter your [employer] Access Code exactly as it appears below. You will not need to use this code after youve completed the sign-up process. If you do not sign up before the expiration date, you must request a new code. · [employer] Access Code: [email or web address] Expires: 2/26/2018  1:35 PM 
 
4. Enter the last four digits of your Social Security Number (xxxx) and Date of Birth (mm/dd/yyyy) as indicated and click Submit. You will be taken to the next sign-up page. 5. Create a HomeJab ID. This will be your HomeJab login ID and cannot be changed, so think of one that is secure and easy to remember. 6. Create a HomeJab password. You can change your password at any time. 7. Enter your Password Reset Question and Answer. This can be used at a later time if you forget your password. 8. Enter your e-mail address. You will receive e-mail notification when new information is available in [address] 9. Click Sign Up. You can now view and download portions of your medical record. 10. Click the Download Summary menu link to download a portable copy of your medical information. If you have questions, please visit the Frequently Asked Questions section of the HomeJab website. Remember, HomeJab is NOT to be used for urgent needs. For medical emergencies, dial 911. Now available from your iPhone and Android! Please provide this summary of care documentation to your next provider. Your primary care clinician is listed as [address]. If you have any questions after today's visit, please call [phone].

## 2017-11-28 NOTE — PROGRESS NOTES
Chief Complaint   Patient presents with    Annual Wellness Visit     Body mass index is 30.34 kg/(m^2). 1. Have you been to the ER, urgent care clinic since your last visit? Hospitalized since your last visit? No    2. Have you seen or consulted any other health care providers outside of the [address] [place] [address] since your last visit? Include any pap smears or colon screening.  No    Reviewed record in preparation for visit and have necessary documentation  Pt did not bring medication to office visit for review  Information was given to pt on Advanced Directives, Living Will  Information was given on Shingles Vaccine  Opportunity was given for questions  Goals that were addressed and/or need to be completed after this appointment include:     Health Maintenance Due   Topic Date Due    MEDICARE YEARLY EXAM  11/19/2017    EYE EXAM RETINAL OR DILATED Q1  12/19/2017

## 2017-11-28 NOTE — PROGRESS NOTES
I have reviewed the evaluation of this patient and agree with the findings and plan. Misti saw and spoke with him to answer any questions. Saadia Mitchell M.D.

## 2017-11-28 NOTE — PATIENT INSTRUCTIONS
Medicare Part B Preventive Services Guidelines/Limitations Date last completed and Frequency Due Date   Bone Mass Measurement  (age 72 & older, biennial) Requires diagnosis related to osteoporosis or estrogen deficiency. Biennial benefit unless patient has history of long-term glucocorticoid tx or baseline is needed because initial test was by other method   Recommended every 2 years As recommended by your PCP or Specialist     Cardiovascular Screening Blood Tests (every 5 years)  Total cholesterol, HDL, Triglycerides Order as a panel if possible Completed   8/14/17  As recommended by your PCP As recommended by your PCP or Specialist   Colorectal Cancer Screening  -Fecal occult blood test (annual)  -Flexible sigmoidoscopy (5y)  -Screening colonoscopy (10y)  -Barium Enema Age 49-80; After age [de-identified] if history of abnormal results Completed    12/12/11  Recommended every 5 to 10 years  As recommended by your PCP or Specialist     Counseling to Prevent Tobacco Use (up to 8 sessions per year)  - Counseling greater than 3 and up to 10 minutes  - Counseling greater than 10 minutes Patients must be asymptomatic of tobacco-related conditions to receive as preventive service N/A N/A   Diabetes Screening Tests (at least every 3 years, Medicare covers annually or at 6-month intervals for prediabetic patients)    Fasting blood sugar (FBS) or glucose tolerance test (GTT) Patient must be diagnosed with one of the following:  -Hypertension, Dyslipidemia, obesity, previous impaired FBS or GTT  Or any two of the following: overweight, FH of diabetes, age ? 72, history of gestational diabetes, birth of baby weighing more than 9 pounds Completed   8/14/17  Recommended every 3 years for non-diabetics    Recommended every 3-6 months for Pre-Diabetics and Diabetics As recommended by your PCP or Specialist     Glaucoma Screening (no USPSTF recommendation) Diabetes mellitus, family history, , age 48 or over,  American, age 72 or over Completed     Recommended annually As recommended by your PCP or Specialist   Prostate Cancer Screening (annually up to age 76)  - Digital rectal exam (ADONIS)  - Prostate specific antigen (PSA) Annually (age 48 or over), ADONIS not paid separately when covered E/M service is provided on same date     Recommended annually to age 76 As recommended by your PCP or Specialist     Seasonal Influenza Vaccination (annually)  Completed   9/20/17  Recommended Annually Due Fall 2017   TDAP Vaccination  Completed   5/17/11  Recommended every 10 years As recommended by your PCP or Specialist   Zoster (Shingles) Vaccination Covered by Medicare Part D through the pharmacy- PCP provides prescription Completed   7/26/13  Recommended once over age 48  Complete   Pneumococcal Vaccination (once after 72)  Pneumo 22-   Recommended once over the age of 72    Prevnar 15-  Recommended once over the age of 72 Completed  8/13/17           Ultrasound Screening for Abdominal Aortic Aneurysm (AAA) (once) Patient must be referred through IPPE and not have had a screening for abdominal aortic aneurysm before under Medicare. Limited to patients who meet one of the following criteria:  - Men who are 73-68 years old and have smoked more than 100 cigarettes in their lifetime.  -Anyone with a FH of AAA  -Anyone recommended for screening by USPSTF Not indicated unless recommended by PCP   Not indicated unless recommended by PCP     Family Practice Management 2011      Medicare Wellness Visit, Male    The best way to live healthy is to have a healthy lifestyle by eating a well-balanced diet, exercising regularly, limiting alcohol and stopping smoking. Regular physical exams and screening tests are another way to keep healthy. Preventive exams provided by your health care provider can find health problems before they become diseases or illnesses.  Preventive services including immunizations, screening tests, monitoring and exams can help you take care of your own health. All people over age 72 should have a pneumovax  and and a prevnar shot to prevent pneumonia. These are once in a lifetime unless you and your provider decide differently. All people over 65 should have a yearly flu shot and a tetanus vaccine every 10 years. Screening for diabetes mellitus with a blood sugar test should be done every year. Glaucoma is a disease of the eye due to increased ocular pressure that can lead to blindness and it should be done every year by an eye professional.    Cardiovascular screening tests that check for elevated lipids (fatty part of blood) which can lead to heart disease and strokes should be done every 5 years. Colorectal screening that evaluates for blood or polyps in your colon should be done yearly as a stool test or every five years as a flexible sigmoidoscope or every 10 years as a colonoscopy up to age 76. Men up to age 76 may need a screening blood test for prostate cancer at certain intervals, depending on their personal and family history. This decision is between the patient and his provider. If you have been a smoker or had family history of abdominal aortic aneurysms, you and your provider may decide to schedule an ultrasound test of your aorta. Hepatitis C screening is also recommended for anyone born between [birth year] through [address]. A shingles vaccine is also recommended once in a lifetime after age 61. Your Medicare Wellness Exam is recommended annually.     Here is a list of your current Health Maintenance items with a due date:  Health Maintenance Due   Topic Date Due    Eye Exam  12/19/2017

## 2017-11-28 NOTE — PROGRESS NOTES
This is a Subsequent Medicare Annual Wellness Exam (AWV) (Performed 12 months after IPPE or effective date of Medicare Part B enrollment)    I have reviewed the patient's medical history in detail and updated the computerized patient record. History     Past Medical History:   Diagnosis Date    Cardiomyopathy, dilated, nonischemic (HCC)     Colon polyps     Hypercholesterolemia     Prostate cancer ([facility]     [address], watch and wait strategy    [facility] spotted fever)     T2DM (type 2 diabetes mellitus) ([facility] 8/21/2013      Past Surgical History:   Procedure Laterality Date    CARDIAC SURG PROCEDURE UNLIST      HX HERNIA REPAIR      left inguinal x 2     Current Outpatient Prescriptions   Medication Sig Dispense Refill    simvastatin (ZOCOR) 20 mg tablet TAKE 1 TABLET NIGHTLY 90 Tab 2    FREESTYLE LANCETS 28 gauge misc USE TO TEST DAILY 100 Lancet 4    trandolapril (MAVIK) 2 mg tablet TAKE 1 TABLET NIGHTLY FOR LEFT VENTRICULAR DYSFUNCTION FOLLOWING MI 90 Tab 3    carvedilol (COREG) 3.125 mg tablet TAKE 1 TABLET TWICE A DAY WITH MEALS 180 Tab 3    glucose blood VI test strips (FREESTYLE LITE STRIPS) strip Dx E11.9  testing once a day 50 Strip 5    levothyroxine (SYNTHROID) 50 mcg tablet TAKE 1 TABLET DAILY BEFORE BREAKFAST 90 Tab 3    aspirin delayed-release 81 mg tablet Take  by mouth daily.  Cholecalciferol, Vitamin D3, (VITAMIN D3) 1,000 unit cap Take  by mouth daily.  fish oil-dha-epa (FISH OIL) 1,200-144-216 mg Cap Take  by mouth.  VITAMIN B COMPLEX (B COMPLEX PO) Take  by mouth.  PV W-O MIKO/FERROUS FUMARATE/FA (M-VIT PO) Take  by mouth. Medication reconciliation completed by MA/ LPN and reviewed by PCP.       Allergies   Allergen Reactions    Pcn [Penicillins] Rash and Swelling    Robitussin [Guaifenesin] Hives     Family History   Problem Relation Age of Onset    Cancer Father      GI    Hypertension Mother     Diabetes Mother     Cancer Sister breast     Social History   Substance Use Topics    Smoking status: Never Smoker    Smokeless tobacco: Never Used    Alcohol use No     Patient states he does not abuse or consume tobacco or alcohol beverages. Patient Active Problem List   Diagnosis Code    Colon polyps K63.5    Mild nonproliferative diabetic retinopathy ([facility] E11.3299    Hypothyroidism E03.9    Asymptomatic PVCs I49.3    T2DM (type 2 diabetes mellitus) ([facility] E11.9    [facility] spotted fever) A77.0    Congestive heart failure stage B I51.9    Ischemic cardiomyopathy I25.5    Type 2 diabetes, controlled, with retinopathy ([facility] E11.319    Mild 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 edema associated with diabetes mellitus due to underlying condition ([facility] T58.4538    Prostate cancer ([facility] C61       Depression Risk Factor Screening:     PHQ over the last two weeks 11/28/2017   Little interest or pleasure in doing things Not at all   Feeling down, depressed or hopeless Not at all   Total Score PHQ 2 0     Patient denies thoughts of harm to self or others. Patient states there is a strong support system within friends & family. Alcohol Risk Factor Screening: You do not drink alcohol or very rarely. Functional Ability and Level of Safety:   Hearing Loss  Hearing is good. Activities of Daily Living  The home contains: no safety equipment.   Patient does total self care    ADL Assessment 11/28/2017   Feeding yourself No Help Needed   Getting from bed to chair No Help Needed   Getting dressed No Help Needed   Bathing or showering No Help Needed   Walk across the room (includes cane/walker) No Help Needed   Using the telphone No Help Needed   Taking your medications No Help Needed   Preparing meals No Help Needed   Managing money (expenses/bills) No Help Needed   Moderately strenuous housework (laundry) No Help Needed   Shopping for personal items (toiletries/medicines) No Help Needed   Shopping for groceries No Help Needed   Driving No Help Needed   Climbing a flight of stairs No Help Needed   Getting to places beyond walking distances No Help Needed     Patient remains active as a  for two race teams. He also supports his wife who is on continuous oxygen and is limited with her ADLs. Fall Risk  Fall Risk Assessment, last 12 mths 11/28/2017   Able to walk? Yes   Fall in past 12 months? No     Patient denies falls within the past year & verbalizes awareness of fall prevention strategies. Abuse Screen  Patient is not abused    Cognitive Screening   Evaluation of Cognitive Function:  Has your family/caregiver stated any concerns about your memory: no  Normal    Patient Care Team   Patient Care Team:  Arleth Simmons MD as PCP - Jeniffer Barraza MD (Urology)    Assessment/Plan   Education and counseling provided:  Are appropriate based on today's review and evaluation    Diagnoses and all orders for this visit:    1. Medicare annual wellness visit, subsequent    2. Alcohol screening    3. Depression screening    Other orders  -     glucose blood VI test strips (FREESTYLE LITE STRIPS) strip; Dx E11.9  testing once a day        Health Maintenance Due   Topic Date Due    EYE EXAM RETINAL OR DILATED Q1  12/19/2017     RN discussed with patient about an Advanced Medical Directive. Provided patient blank Advanced Medical Directive. RN reviewed Advanced Medical Directive paperwork with patient with a brief description of how to complete the form. Requested that if document completed, to please provide a copy of completed Advanced Medical Directive to PCP office. Patient verbalized understanding. Patient is up to date on the following immunizations: Seasonal Influenza (completed 9/20/17); Tdap (completed 5/17/11); Zoster (completed 7/26/13); Pneumococcal (completed 8/13/17).     Patient states that she follows the PCP's recommendations for the following screenings: colonoscopy (completed 12/12/11). Patient is established with Dr. Jerry Russo, [place], [place]. Patient's last appointment 12/19/16-he visits annually. Patient verbalized understanding of all information discussed. Patient was given the opportunity to ask questions.

## 2017-12-15 ENCOUNTER — OFFICE VISIT (OUTPATIENT)
Dept: FAMILY MEDICINE CLINIC | Age: 77
End: 2017-12-15

## 2017-12-15 VITALS
RESPIRATION RATE: 20 BRPM | HEIGHT: 66 IN | DIASTOLIC BLOOD PRESSURE: 86 MMHG | BODY MASS INDEX: 30.53 KG/M2 | OXYGEN SATURATION: 93 % | SYSTOLIC BLOOD PRESSURE: 134 MMHG | WEIGHT: 190 LBS | HEART RATE: 72 BPM | TEMPERATURE: 97.5 F

## 2017-12-15 DIAGNOSIS — C61 PROSTATE CANCER (HCC): Chronic | ICD-10-CM

## 2017-12-15 DIAGNOSIS — E11.39 TYPE 2 DIABETES MELLITUS WITH OTHER OPHTHALMIC COMPLICATION, WITHOUT LONG-TERM CURRENT USE OF INSULIN (HCC): Chronic | ICD-10-CM

## 2017-12-15 DIAGNOSIS — E03.9 ACQUIRED HYPOTHYROIDISM: Chronic | ICD-10-CM

## 2017-12-15 DIAGNOSIS — I25.5 ISCHEMIC CARDIOMYOPATHY: ICD-10-CM

## 2017-12-15 DIAGNOSIS — E08.3293 MILD NONPROLIFERATIVE DIABETIC RETINOPATHY OF BOTH EYES WITHOUT MACULAR EDEMA ASSOCIATED WITH DIABETES MELLITUS DUE TO UNDERLYING CONDITION (HCC): Primary | Chronic | ICD-10-CM

## 2017-12-15 NOTE — PROGRESS NOTES
Progress Note    Patient: Jona Head MRN: [id number]  SSN: [id number]    YOB: 1940  Age: 69 y.o. Sex: male        Chief Complaint   Patient presents with    Diabetes         Subjective:     Encounter Diagnoses   Name Primary?  Mild nonproliferative diabetic retinopathy of both eyes without macular edema associated with diabetes mellitus due to underlying condition [facility] [place] [facility]): This patient is managed under a comprehensive plan of care for Diabetes. Overall the patient feels well with good energy level. Pertinent Labs:   Lab Results   Component Value Date/Time    Hemoglobin A1c 6.1 08/14/2017 01:56 PM    Hemoglobin A1c 5.8 10/26/2016 09:34 AM    Hemoglobin A1c 5.8 04/20/2016 08:41 AM      Body mass index is 30.67 kg/(m^2). Lab Results   Component Value Date/Time    LDL, calculated 64 08/14/2017 01:56 PM         Lab Results   Component Value Date/Time    Sodium 140 08/14/2017 01:56 PM    Potassium 4.5 08/14/2017 01:56 PM    Chloride 99 08/14/2017 01:56 PM    CO2 25 08/14/2017 01:56 PM    Anion gap 10 10/06/2010 08:58 AM    Glucose 107 08/14/2017 01:56 PM    BUN 14 08/14/2017 01:56 PM    Creatinine 0.98 08/14/2017 01:56 PM    BUN/Creatinine ratio 14 08/14/2017 01:56 PM    GFR est AA 87 08/14/2017 01:56 PM    GFR est non-AA 75 08/14/2017 01:56 PM    Calcium 9.7 08/14/2017 01:56 PM    AST (SGOT) 22 08/14/2017 01:56 PM    Alk.  phosphatase 51 08/14/2017 01:56 PM    Protein, total 7.1 08/14/2017 01:56 PM    Albumin 4.8 08/14/2017 01:56 PM    Globulin 2.8 10/06/2010 08:58 AM    A-G Ratio 2.1 08/14/2017 01:56 PM    ALT (SGPT) 18 08/14/2017 01:56 PM     Lab Results   Component Value Date/Time    Microalbumin/Creat ratio (mg/g creat) 13 12/08/2009 11:22 AM    Microalb/Creat ratio (ug/mg creat.) 5.7 08/14/2017 01:56 PM    Microalbumin,urine random 1.72 12/08/2009 11:22 AM      Frequency of home glucose testing: None     Blood Sugar range at home:   Last eye exam: Scheduled   Last foot exam: This year.   Polyuria, polyphagia or polydipsia: No   Retinopathy: Yes   Neuropathy SX: No    Low blood sugar symptoms: No   Dietary compliance: Good   Medication compliance:Good   On ASA: Yes   Depression: No   CKD:no     Wt Readings from Last 3 Encounters:   12/15/17 190 lb (86.2 kg)   11/28/17 188 lb (85.3 kg)   08/14/17 186 lb 9.6 oz (84.6 kg)        History   Smoking Status    Never Smoker   Smokeless Tobacco    Never Used     All the patient's questions regarding medications, diet and exercise were answered. Goal of A1C of less than 7.5% is our goal.   Our overall goal is to reduce or eliminate the long term consequences of poorly controlled diabetes. Yes    Acquired hypothyroidism:  Lab Results   Component Value Date/Time    TSH 2.220 08/14/2017 01:56 PM    T4, Free 1.34 08/14/2017 01:56 PM      Denies fatigue, nervousness,weight changes, heat orcold intolerance, bowel changes,skin changes, cardiovascular symptoms, hair loss, feeling excessive energy, tremor, palpitations and weight loss. Thyroid medication has been unchanged since last medication check and labs.  Ischemic cardiomyopathy: He works every day and has minimal restrictions to his activities. He reports no chest pain, no shortness of breath, no ALLEN and no PND. Current and past medical information:    Current Medications after this visit[de-identified]   Current Outpatient Prescriptions   Medication Sig    glucose blood VI test strips (FREESTYLE LITE STRIPS) strip Dx E11.9  testing once a day    simvastatin (ZOCOR) 20 mg tablet TAKE 1 TABLET NIGHTLY    FREESTYLE LANCETS 28 gauge misc USE TO TEST DAILY    trandolapril (MAVIK) 2 mg tablet TAKE 1 TABLET NIGHTLY FOR LEFT VENTRICULAR DYSFUNCTION FOLLOWING MI    carvedilol (COREG) 3.125 mg tablet TAKE 1 TABLET TWICE A DAY WITH MEALS    levothyroxine (SYNTHROID) 50 mcg tablet TAKE 1 TABLET DAILY BEFORE BREAKFAST    aspirin delayed-release 81 mg tablet Take  by mouth daily.     Cholecalciferol, Vitamin D3, (VITAMIN D3) 1,000 unit cap Take  by mouth daily.  fish oil-dha-epa (FISH OIL) 1,200-144-216 mg Cap Take  by mouth.  VITAMIN B COMPLEX (B COMPLEX PO) Take  by mouth.  PV W-O MIKO/FERROUS FUMARATE/FA (M-VIT PO) Take  by mouth. No current facility-administered medications for this visit. Patient Active Problem List    Diagnosis Date Noted    Type 2 diabetes, controlled, with retinopathy ([facility] [address] 12/15/2015    Mild 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 edema associated with diabetes mellitus due to underlying condition ([address] 12/15/2015    Prostate cancer ([facility] [address] 12/15/2015    Ischemic cardiomyopathy 01/06/2015    Congestive heart failure stage B 05/15/2014    RMSF ([place] spotted fever)     Asymptomatic PVCs 08/21/2013    T2DM (type 2 diabetes mellitus) ([facility] [address] 08/21/2013    Hypothyroidism 09/05/2012    Mild nonproliferative diabetic retinopathy ([facility] [address] 07/19/2010    Colon polyps 05/31/2010       Past Medical History:   Diagnosis Date    Cardiomyopathy, dilated, nonischemic ([address]     Colon polyps     Hypercholesterolemia     Prostate cancer ([facility] [address]     [address], watch and wait strategy    R[facility] spotted fever)     T2DM (type 2 diabetes mellitus) ([facility] [address] 8/21/2013       Allergies   Allergen Reactions    Pcn [Penicillins] Rash and Swelling    Robitussin [Guaifenesin] Hives       Past Surgical History:   Procedure Laterality Date    CARDIAC SURG PROCEDURE UNLIST      HX HERNIA REPAIR      left inguinal x 2       Social History     Social History    Marital status:      Spouse name: N/A    Number of children: N/A    Years of education: N/A     Social History Main Topics    Smoking status: Never Smoker    Smokeless tobacco: Never Used    Alcohol use No    Drug use: No    Sexual activity: Not Asked     Other Topics Concern    None     Social History Narrative       Review of Systems   Constitutional: Negative. Negative for chills, fever, malaise/fatigue and weight loss. HENT: Negative. Negative for hearing loss. Eyes: Negative. Negative for blurred vision and double vision. Respiratory: Negative. Negative for cough, hemoptysis, sputum production and shortness of breath. Cardiovascular: Negative. Negative for chest pain, palpitations and orthopnea. Gastrointestinal: Negative. Negative for abdominal pain, blood in stool, heartburn, nausea and vomiting. Genitourinary: Negative. Negative for dysuria, frequency and urgency. Musculoskeletal: Negative. Negative for back pain, myalgias and neck pain. Skin: Negative. Negative for rash. Neurological: Negative. Negative for dizziness, tingling, tremors, weakness and headaches. Endo/Heme/Allergies: Negative. Psychiatric/Behavioral: Negative. Negative for depression. Objective:     Vitals:    12/15/17 0831   BP: 134/86   Pulse: 72   Resp: 20   Temp: 97.5 °F (36.4 °C)   TempSrc: Oral   SpO2: 93%   Weight: 190 lb (86.2 kg)   Height: 5' 6\" (1.676 m)      Body mass index is 30.67 kg/(m^2). Physical Exam   Constitutional: He is oriented to person, place, and time and well-developed, well-nourished, and in no distress. HENT:   Head: Normocephalic and atraumatic. Mouth/Throat: Oropharynx is clear and moist.   Eyes: Right eye exhibits no discharge. Left eye exhibits no discharge. No scleral icterus. Neck: No tracheal deviation present. No thyromegaly present. No bruit. Cardiovascular: Normal rate, regular rhythm and normal heart sounds. Pulmonary/Chest: Effort normal and breath sounds normal.   Abdominal: Soft. Neurological: He is alert and oriented to person, place, and time. Skin: No rash noted. No erythema. Psychiatric: Mood and affect normal.   Nursing note and vitals reviewed.         Health Maintenance Due   Topic Date Due    EYE EXAM RETINAL OR DILATED Q1  12/19/2017         Assessment and orders:       ICD-10-CM ICD-9-CM 1. Mild nonproliferative diabetic retinopathy of both eyes without macular edema associated with diabetes mellitus due to underlying condition ([facility] [address] (type 2 diabetes controlled without long-term insulin use) T01.8125 249.50 LIPID PANEL     025.03 METABOLIC PANEL, COMPREHENSIVE      HEMOGLOBIN A1C WITH EAG      HEMOGLOBIN   2. Acquired hypothyroidism-no new symptoms E03.9 244.9 TSH 3RD GENERATION      T4, FREE   3. Ischemic cardiomyopathy-stable symptoms I25.5 414.8          Plan of care:  Discussed diagnoses in detail with patient. Medication risks/benefits/side effects discussed with patient. All of the patient's questions were addressed. The patient understands and agrees with our plan of care. The patient knows to call back if they are unsure of or forget any changes we discussed today or if the symptoms change. The patient received an After-Visit Summary which contains VS, orders, medication list and allergy list. This can be used as a \"mini-medical record\" should they have to seek medical care while out of town. Patient Care Team:  Elba Wilcox MD as PCP - Kali Ribera MD (Urology)    Follow-up Disposition:  Return in about 4 months (around 4/15/2018).     Future Appointments  Date Time Provider Sarina Richards   4/16/2018 8:20 AM Elba Wilcox MD [facility] [address] [facility] [address]       Signed By: Elba Wilcox MD     December 15, 2017

## 2017-12-15 NOTE — PROGRESS NOTES
Chief Complaint   Patient presents with    Diabetes     Visit Vitals    /86    Pulse 72    Temp 97.5 °F (36.4 °C) (Oral)    Resp 20    Ht 5' 6\" (1.676 m)    Wt 190 lb (86.2 kg)    SpO2 93%    BMI 30.67 kg/m2

## 2017-12-15 NOTE — PATIENT INSTRUCTIONS

## 2017-12-15 NOTE — MR AVS SNAPSHOT
Visit Information Date & Time Provider Department Dept. Phone Encounter #  
 12/15/2017  8:20 AM Thaddeus Lao MD [address] [place] [address] [id number] Follow-up Instructions Return in about 4 months (around 4/15/2018). Upcoming Health Maintenance Date Due  
 EYE EXAM RETINAL OR DILATED Q1 12/19/2017 HEMOGLOBIN A1C Q6M 2/14/2018 FOOT EXAM Q1 8/14/2018 MICROALBUMIN Q1 8/14/2018 LIPID PANEL Q1 8/14/2018 MEDICARE YEARLY EXAM 11/29/2018 GLAUCOMA SCREENING Q2Y 12/19/2018 DTaP/Tdap/Td series (2 - Td) 5/17/2021 COLONOSCOPY 12/12/2021 Allergies as of 12/15/2017  Review Complete On: 12/15/2017 By: Arcelia Billingsley LPN Severity Noted Reaction Type Reactions Pcn [Penicillins]  04/05/2010    Rash, Swelling Robitussin [Guaifenesin]  04/05/2010    Hives Current Immunizations  Reviewed on 12/15/2015 Name Date H1N1 FLU VACCINE 1/20/2010 Influenza High Dose Vaccine PF 9/20/2017 Influenza Vaccine 10/9/2015, 11/17/2014, 9/23/2013 Influenza Vaccine (Quad) PF 9/26/2016 Influenza Vaccine Split 11/2/2012, 10/25/2011, 12/8/2009 Pneumococcal Conjugate (PCV-13) 8/13/2015 TD Vaccine 8/17/2001 TDAP Vaccine 5/17/2011 ZZZ-RETIRED (DO NOT USE) Pneumococcal Vaccine (Unspecified Type) 12/5/2007, 12/4/1996 Zoster Vaccine, Live 7/26/2013 Not reviewed this visit You Were Diagnosed With   
  
 Codes Comments Mild nonproliferative diabetic retinopathy of both eyes without macular edema associated with diabetes mellitus due to underlying condition [facility])    -  Primary ICD-10-CM: I25.3664 ICD-9-CM: 249.50, 362.04 Acquired hypothyroidism     ICD-10-CM: E03.9 ICD-9-CM: 244.9 Ischemic cardiomyopathy     ICD-10-CM: I25.5 ICD-9-CM: 414.8 Vitals BP Pulse Temp Resp Height(growth percentile) Weight(growth percentile) 134/86 72 97.5 °F (36.4 °C) (Oral) 20 5' 6\" (1.676 m) 190 lb (86.2 kg) SpO2 BMI Smoking Status 93% 30.67 kg/m2 Never Smoker Vitals History BMI and BSA Data Body Mass Index Body Surface Area  
 30.67 kg/m 2 2 m 2 Preferred Pharmacy Pharmacy Name Phone [address] [facility] [phone] Your Updated Medication List  
  
   
This list is accurate as of: 12/15/17  8:54 AM.  Always use your most recent med list.  
  
  
  
  
 aspirin delayed-release 81 mg tablet Take  by mouth daily. B COMPLEX PO Take  by mouth. carvedilol 3.125 mg tablet Commonly known as:  COREG  
TAKE 1 TABLET TWICE A DAY WITH MEALS FISH OIL 1,200-144-216 mg Cap Generic drug:  fish oil-dha-epa Take  by mouth. FREESTYLE LANCETS 28 gauge Misc Generic drug:  lancets USE TO TEST DAILY  
  
 glucose blood VI test strips strip Commonly known as:  FREESTYLE LITE STRIPS Dx E11.9  testing once a day  
  
 levothyroxine 50 mcg tablet Commonly known as:  SYNTHROID  
TAKE 1 TABLET DAILY BEFORE BREAKFAST  
  
 M-VIT PO Take  by mouth. simvastatin 20 mg tablet Commonly known as:  ZOCOR  
TAKE 1 TABLET NIGHTLY  
  
 trandolapril 2 mg tablet Commonly known as:  MAVIK  
TAKE 1 TABLET NIGHTLY FOR LEFT VENTRICULAR DYSFUNCTION FOLLOWING MI  
  
 VITAMIN D3 1,000 unit Cap Generic drug:  cholecalciferol Take  by mouth daily. We Performed the Following HEMOGLOBIN A1C WITH EAG [19204 CPT(R)] HEMOGLOBIN [id number] CPT(R)] LIPID PANEL [26267 CPT(R)] METABOLIC PANEL, COMPREHENSIVE [07440 CPT(R)] T4, FREE [id number] CPT(R)] TSH 3RD GENERATION [97102 CPT(R)] Follow-up Instructions Return in about 4 months (around 4/15/2018). Patient Instructions Hypothyroidism: Care Instructions Your Care Instructions You have hypothyroidism, which means that your body is not making enough thyroid hormone. This hormone helps your body use energy. If your thyroid level is low, you may feel tired, be constipated, have an increase in your blood pressure, or have dry skin or memory problems. You may also get cold easily, even when it is warm. Women with low thyroid levels may have heavy menstrual periods. A blood test to find your thyroid-stimulating hormone (TSH) level is used to check for hypothyroidism. A high TSH level may mean that you have low thyroid. When your body is not making enough thyroid hormone, TSH levels rise in an effort to make the body produce more. The treatment for hypothyroidism is to take thyroid hormone pills. You should start to feel better in 1 to 2 weeks. But it can take several months to see changes in the TSH level. You will need regular visits with your doctor to make sure you have the right dose of medicine. Most people need treatment for the rest of their lives. You will need to see your doctor regularly to have blood tests and to make sure you are doing well. Follow-up care is a key part of your treatment and safety. Be sure to make and go to all appointments, and call your doctor if you are having problems. It's also a good idea to know your test results and keep a list of the medicines you take. How can you care for yourself at home? · Take your thyroid hormone medicine exactly as prescribed. Call your doctor if you think you are having a problem with your medicine. Most people do not have side effects if they take the right amount of medicine regularly. ¨ Take the medicine 30 minutes before breakfast, and do not take it with calcium, vitamins, or iron. ¨ Do not take extra doses of your thyroid medicine. It will not help you get better any faster, and it may cause side effects. ¨ If you forget to take a dose, do NOT take a double dose of medicine. Take your usual dose the next day. · Tell your doctor about all prescription, herbal, or over-the-counter products you take. · Take care of yourself. Eat a healthy diet, get enough sleep, and get regular exercise. When should you call for help? Call 911 anytime you think you may need emergency care. For example, call if: 
? · You passed out (lost consciousness). ? · You have severe trouble breathing. ? · You have a very slow heartbeat (less than 60 beats a minute). ? · You have a low body temperature (95°F or below). ?Call your doctor now or seek immediate medical care if: 
? · You feel tired, sluggish, or weak. ? · You have trouble remembering things or concentrating. ? · You do not begin to feel better 2 weeks after starting your medicine. ? Watch closely for changes in your health, and be sure to contact your doctor if you have any problems. Where can you learn more? Go to http://mabel-maliha.info/. Enter G458 in the search box to learn more about \"Hypothyroidism: Care Instructions. \" Current as of: May 12, 2017 Content Version: 11.4 © 9520-5150 [employer]. Care instructions adapted under license by [employer] (which disclaims liability or warranty for this information). If you have questions about a medical condition or this instruction, always ask your healthcare professional. [address] any warranty or liability for your use of this information. Introducing [facility]! [facility] introduces On[employer] patient portal. Now you can access parts of your medical record, email your doctor's office, and request medication refills online. 1. In your internet browser, go to [email or web address] 2. Click on the First Time User? Click Here link in the Sign In box. You will see the New Member Sign Up page. 3. Enter your On[employer] Access Code exactly as it appears below.  You will not need to use this code after youve completed the sign-up process. If you do not sign up before the expiration date, you must request a new code. · [employer] Access Code: [email or web address] Expires: 2/26/2018  1:35 PM 
 
4. Enter the last four digits of your Social Security Number (xxxx) and Date of Birth (mm/dd/yyyy) as indicated and click Submit. You will be taken to the next sign-up page. 5. Create a [employer] ID. This will be your [employer] login ID and cannot be changed, so think of one that is secure and easy to remember. 6. Create a [employer] password. You can change your password at any time. 7. Enter your Password Reset Question and Answer. This can be used at a later time if you forget your password. 8. Enter your e-mail address. You will receive e-mail notification when new information is available in [address]. 9. Click Sign Up. You can now view and download portions of your medical record. 10. Click the Download Summary menu link to download a portable copy of your medical information. If you have questions, please visit the Frequently Asked Questions section of the [employer] website. Remember, [employer] is NOT to be used for urgent needs. For medical emergencies, dial 911. Now available from your iPhone and Android! Please provide this summary of care documentation to your next provider. Your primary care clinician is listed as [address]. If you have any questions after today's visit, please call [phone].

## 2017-12-16 LAB
ALBUMIN SERPL-MCNC: 4.8 G/DL (ref 3.5–4.8)
ALBUMIN/GLOB SERPL: 2.2 {RATIO} (ref 1.2–2.2)
ALP SERPL-CCNC: 52 IU/L (ref 39–117)
ALT SERPL-CCNC: 22 IU/L (ref 0–44)
AST SERPL-CCNC: 20 IU/L (ref 0–40)
BILIRUB SERPL-MCNC: 1.1 MG/DL (ref 0–1.2)
BUN SERPL-MCNC: 15 MG/DL (ref 8–27)
BUN/CREAT SERPL: 15 (ref 10–24)
CALCIUM SERPL-MCNC: 9.7 MG/DL (ref 8.6–10.2)
CHLORIDE SERPL-SCNC: 96 MMOL/L (ref 96–106)
CHOLEST SERPL-MCNC: 134 MG/DL (ref 100–199)
CO2 SERPL-SCNC: 27 MMOL/L (ref 18–29)
CREAT SERPL-MCNC: 1.03 MG/DL (ref 0.76–1.27)
EST. AVERAGE GLUCOSE BLD GHB EST-MCNC: 123 MG/DL
GFR SERPLBLD CREATININE-BSD FMLA CKD-EPI: 70 ML/MIN/1.73
GFR SERPLBLD CREATININE-BSD FMLA CKD-EPI: 81 ML/MIN/1.73
GLOBULIN SER CALC-MCNC: 2.2 G/DL (ref 1.5–4.5)
GLUCOSE SERPL-MCNC: 124 MG/DL (ref 65–99)
HBA1C MFR BLD: 5.9 % (ref 4.8–5.6)
HDLC SERPL-MCNC: 48 MG/DL
HGB BLD-MCNC: 14.9 G/DL (ref 13–17.7)
LDLC SERPL CALC-MCNC: 70 MG/DL (ref 0–99)
POTASSIUM SERPL-SCNC: 4.6 MMOL/L (ref 3.5–5.2)
PROT SERPL-MCNC: 7 G/DL (ref 6–8.5)
SODIUM SERPL-SCNC: 139 MMOL/L (ref 134–144)
T4 FREE SERPL-MCNC: 1.39 NG/DL (ref 0.82–1.77)
TRIGL SERPL-MCNC: 81 MG/DL (ref 0–149)
TSH SERPL DL<=0.005 MIU/L-ACNC: 3.53 UIU/ML (ref 0.45–4.5)
VLDLC SERPL CALC-MCNC: 16 MG/DL (ref 5–40)

## 2018-02-06 RX ORDER — LEVOTHYROXINE SODIUM 50 UG/1
TABLET ORAL
Qty: 90 TAB | Refills: 3 | Status: SHIPPED | OUTPATIENT
Start: 2018-02-06 | End: 2019-02-04 | Stop reason: SDUPTHER

## 2018-02-06 RX ORDER — TRANDOLAPRIL 2 MG/1
TABLET ORAL
Qty: 90 TAB | Refills: 3 | Status: SHIPPED | OUTPATIENT
Start: 2018-02-06 | End: 2019-04-15 | Stop reason: SDUPTHER

## 2018-02-13 RX ORDER — CARVEDILOL 3.12 MG/1
TABLET ORAL
Qty: 180 TAB | Refills: 3 | Status: SHIPPED | OUTPATIENT
Start: 2018-02-13 | End: 2019-02-08 | Stop reason: SDUPTHER

## 2018-04-16 ENCOUNTER — OFFICE VISIT (OUTPATIENT)
Dept: FAMILY MEDICINE CLINIC | Age: 78
End: 2018-04-16

## 2018-04-16 VITALS
RESPIRATION RATE: 20 BRPM | HEART RATE: 40 BPM | SYSTOLIC BLOOD PRESSURE: 112 MMHG | WEIGHT: 190 LBS | OXYGEN SATURATION: 94 % | TEMPERATURE: 98.5 F | DIASTOLIC BLOOD PRESSURE: 63 MMHG | BODY MASS INDEX: 30.53 KG/M2 | HEIGHT: 66 IN

## 2018-04-16 DIAGNOSIS — E55.9 VITAMIN D DEFICIENCY: Chronic | ICD-10-CM

## 2018-04-16 DIAGNOSIS — E13.8: Chronic | ICD-10-CM

## 2018-04-16 DIAGNOSIS — E53.8 B12 DEFICIENCY: Chronic | ICD-10-CM

## 2018-04-16 DIAGNOSIS — R00.1 BRADYCARDIA: ICD-10-CM

## 2018-04-16 DIAGNOSIS — I49.3 ASYMPTOMATIC PVCS: Chronic | ICD-10-CM

## 2018-04-16 DIAGNOSIS — E03.4 HYPOTHYROIDISM DUE TO ACQUIRED ATROPHY OF THYROID: Chronic | ICD-10-CM

## 2018-04-16 DIAGNOSIS — E08.3291 MILD NONPROLIFERATIVE DIABETIC RETINOPATHY OF RIGHT EYE WITHOUT MACULAR EDEMA ASSOCIATED WITH DIABETES MELLITUS DUE TO UNDERLYING CONDITION (HCC): Primary | Chronic | ICD-10-CM

## 2018-04-16 DIAGNOSIS — I25.5 ISCHEMIC CARDIOMYOPATHY: Chronic | ICD-10-CM

## 2018-04-16 NOTE — LETTER
4/16/2018 11:44 AM 
 
Mr. Alyson Ruano [address] [facility] [address] [address] Dear Mr. Mehrdad Malagon: An appointment has been made for you to see Dr. Anyi Longoria, cardiologist, on May 16, 2018 at 10:00. The office is located at [address] [place] [address], [address] [place] [address] and the number to the office is [phone]. If you have any questions, please call our office. Sincerely, Rebecca Monge

## 2018-04-16 NOTE — PROGRESS NOTES
Reviewed record in preparation for visit and have necessary documentation  Pt did not bring medication to office visit for review    Goals that were addressed and/or need to be completed during or after this appointment include   Health Maintenance Due   Topic Date Due    EYE EXAM RETINAL OR DILATED Q1  12/19/2017

## 2018-04-16 NOTE — PATIENT INSTRUCTIONS

## 2018-04-16 NOTE — PROGRESS NOTES
Progress Note    Patient: True Keller MRN: [id number]  SSN: [id number]    YOB: 1940  Age: 69 y.o. Sex: male        Chief Complaint   Patient presents with    Diabetes         Subjective:     Encounter Diagnoses   Name Primary? 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 He does well with diet alone. This patient is managed under a comprehensive plan of care for Diabetes. Overall the patient feels well with good energy level. Pertinent Labs:   Lab Results   Component Value Date/Time    Hemoglobin A1c 5.9 (H) 12/15/2017 09:02 AM    Hemoglobin A1c 6.1 (H) 08/14/2017 01:56 PM    Hemoglobin A1c 5.8 (H) 10/26/2016 09:34 AM      Body mass index is 30.67 kg/(m^2). Lab Results   Component Value Date/Time    LDL, calculated 70 12/15/2017 09:02 AM         Lab Results   Component Value Date/Time    Sodium 139 12/15/2017 09:02 AM    Potassium 4.6 12/15/2017 09:02 AM    Chloride 96 12/15/2017 09:02 AM    CO2 27 12/15/2017 09:02 AM    Anion gap 10 10/06/2010 08:58 AM    Glucose 124 (H) 12/15/2017 09:02 AM    BUN 15 12/15/2017 09:02 AM    Creatinine 1.03 12/15/2017 09:02 AM    BUN/Creatinine ratio 15 12/15/2017 09:02 AM    GFR est AA 81 12/15/2017 09:02 AM    GFR est non-AA 70 12/15/2017 09:02 AM    Calcium 9.7 12/15/2017 09:02 AM    AST (SGOT) 20 12/15/2017 09:02 AM    Alk.  phosphatase 52 12/15/2017 09:02 AM    Protein, total 7.0 12/15/2017 09:02 AM    Albumin 4.8 12/15/2017 09:02 AM    Globulin 2.8 10/06/2010 08:58 AM    A-G Ratio 2.2 12/15/2017 09:02 AM    ALT (SGPT) 22 12/15/2017 09:02 AM     Lab Results   Component Value Date/Time    Microalbumin/Creat ratio (mg/g creat) 13 12/08/2009 11:22 AM    Microalb/Creat ratio (ug/mg creat.) 5.7 08/14/2017 01:56 PM    Microalbumin,urine random 1.72 12/08/2009 11:22 AM      Frequency of home glucose testing: None   Blood Sugar range at home:    Last eye exam: Due   Last foot exam: This year. Polyuria, polyphagia or polydipsia: No   Retinopathy: No   Neuropathy SX: No    Low blood sugar symptoms: No   Dietary compliance: Good   Medication compliance:Good   On ASA: Yes   Depression: No   CKD:no     Wt Readings from Last 3 Encounters:   04/16/18 190 lb (86.2 kg)   12/15/17 190 lb (86.2 kg)   11/28/17 188 lb (85.3 kg)        History   Smoking Status    Never Smoker   Smokeless Tobacco    Never Used     All the patient's questions regarding medications, diet and exercise were answered. Goal of A1C of less than 7.5% is our goal.   Our overall goal is to reduce or eliminate the long term consequences of poorly controlled diabetes.  Hypothyroidism due to acquired atrophy of thyroid:  Lab Results   Component Value Date/Time    TSH 3.530 12/15/2017 09:02 AM    T4, Free 1.39 12/15/2017 09:02 AM      Denies fatigue, nervousness,weight changes, heat orcold intolerance, bowel changes,skin changes, cardiovascular symptoms, hair loss, feeling excessive energy, tremor, palpitations and weight loss. Thyroid medication has been unchanged since last medication check and labs.  Ischemic cardiomyopathy: Last ejection fraction was 30-35%. This was in 2015. He should have his ejection fraction rechecked and be considered for pacemaker/AICD. I discussed with him the fact that with a low ejection fraction your risk of sudden cardiac arrhythmia increases. On exam today his palpable distal pulses only 37. His EKG shows a rate of 74 but there is some atrial arrhythmia. It seems that some of these beats are not being conducted peripherally. This concerns me.  Asymptomatic PVCs: By history but none seen on EKG today.  B12 deficiency:  No symptoms. Lab Results   Component Value Date/Time    Vitamin B12 521 03/12/2009 02:15 PM    Folate >24.0 (H) 03/12/2009 02:15 PM            Vitamin D deficiency:  No sx. Due for testing. Lab Results   Component Value Date/Time    Vitamin D 25-Hydroxy 46 12/08/2009 11:22 AM    VITAMIN D, 25-HYDROXY 60.5 02/27/2012 09:12 AM            Bradycardia: Peripheral pulse. Needs further investigation. Current and past medical information:    Current Medications after this visit[de-identified]     Current Outpatient Prescriptions   Medication Sig    carvedilol (COREG) 3.125 mg tablet TAKE 1 TABLET TWICE A DAY WITH MEALS    trandolapril (MAVIK) 2 mg tablet TAKE 1 TABLET NIGHTLY FOR LEFT VENTRICULAR DYSFUNCTION FOLLOWING MI    levothyroxine (SYNTHROID) 50 mcg tablet TAKE 1 TABLET DAILY BEFORE BREAKFAST    glucose blood VI test strips (FREESTYLE LITE STRIPS) strip Dx E11.9  testing once a day    simvastatin (ZOCOR) 20 mg tablet TAKE 1 TABLET NIGHTLY    FREESTYLE LANCETS 28 gauge misc USE TO TEST DAILY    aspirin delayed-release 81 mg tablet Take  by mouth daily.  Cholecalciferol, Vitamin D3, (VITAMIN D3) 1,000 unit cap Take  by mouth daily.  fish oil-dha-epa (FISH OIL) 1,200-144-216 mg Cap Take  by mouth.  VITAMIN B COMPLEX (B COMPLEX PO) Take  by mouth.  PV W-O MIKO/FERROUS FUMARATE/FA (M-VIT PO) Take  by mouth. No current facility-administered medications for this visit.         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 ([facility] 80/48/3277    Type 2 diabetes, controlled, with retinopathy ([address] 12/15/2015    Mild 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 edema associated with diabetes mellitus due to underlying condition ([address] 12/15/2015    Prostate cancer ([facility] 12/15/2015    Ischemic cardiomyopathy 01/06/2015    Congestive heart failure stage B 05/15/2014    [facility])     Asymptomatic PVCs 08/21/2013    T2DM (type 2 diabetes mellitus) ([facility] 08/21/2013    Hypothyroidism 09/05/2012    Mild nonproliferative diabetic retinopathy ([facility] 07/19/2010    Colon polyps 05/31/2010       Past Medical History:   Diagnosis Date    Cardiomyopathy, dilated, nonischemic ([facility]     Colon polyps     Hypercholesterolemia     Prostate cancer ([facility]     [address], watch and wait strategy    [facility])     T2DM (type 2 diabetes mellitus) ([facility] 8/21/2013       Allergies   Allergen Reactions    Pcn [Penicillins] Rash and Swelling    Robitussin [Guaifenesin] Hives       Past Surgical History:   Procedure Laterality Date    CARDIAC SURG PROCEDURE UNLIST      HX HERNIA REPAIR      left inguinal x 2       Social History     Social History    Marital status:      Spouse name: N/A    Number of children: N/A    Years of education: N/A     Social History Main Topics    Smoking status: Never Smoker    Smokeless tobacco: Never Used    Alcohol use No    Drug use: No    Sexual activity: Not Asked     Other Topics Concern    None     Social History Narrative       Review of Systems   Constitutional: Negative. Negative for chills, fever, malaise/fatigue and weight loss. HENT: Negative. Negative for hearing loss. Eyes: Negative. Negative for blurred vision and double vision. Respiratory: Negative. Negative for cough, hemoptysis, sputum production and shortness of breath. Cardiovascular: Negative. Negative for chest pain, palpitations and orthopnea. Pulse rate does not manage heart rate or EKG. Pulse rate is one half of his EKG rate. Heart sounds are soft. Gastrointestinal: Negative. Negative for abdominal pain, blood in stool, heartburn, nausea and vomiting. Genitourinary: Negative. Negative for dysuria, frequency and urgency. Musculoskeletal: Negative. Negative for back pain, myalgias and neck pain. Skin: Negative. Negative for rash. Neurological: Negative. Negative for dizziness, tingling, tremors, weakness and headaches. Endo/Heme/Allergies: Negative. Psychiatric/Behavioral: Negative. Negative for depression. Objective:     Vitals:    04/16/18 0823   BP: 112/63   Pulse: (!) 40   Resp: 20   Temp: 98.5 °F (36.9 °C)   TempSrc: Oral   SpO2: 94%   Weight: 190 lb (86.2 kg)   Height: 5' 6\" (1.676 m)      Body mass index is 30.67 kg/(m^2). Physical Exam   Constitutional: He is oriented to person, place, and time and well-developed, well-nourished, and in no distress. HENT:   Head: Normocephalic and atraumatic. Mouth/Throat: Oropharynx is clear and moist.   Eyes: Right eye exhibits no discharge. Left eye exhibits no discharge. No scleral icterus. Neck: No tracheal deviation present. No thyromegaly present. No bruit. Cardiovascular: Normal rate. Heart rate sounds irregular   Pulmonary/Chest: Effort normal and breath sounds normal.   Abdominal: Soft. He exhibits no distension. There is no tenderness. There is no rebound. Neurological: He is alert and oriented to person, place, and time. Skin: No rash noted. No erythema. Psychiatric: Mood and affect normal.   Nursing note and vitals reviewed. Health Maintenance Due   Topic Date Due    EYE EXAM RETINAL OR DILATED Q1  12/19/2017         Assessment and orders:     Encounter Diagnoses     ICD-10-CM ICD-9-CM   1. 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 Hypothyroidism due to acquired atrophy of thyroid E03.4 244.8     246.8   4. Ischemic cardiomyopathy I25.5 414.8   5. Asymptomatic PVCs I49.3 427.69   6. B12 deficiency E53.8 266.2   7. Vitamin D deficiency E55.9 268.9   8.  Bradycardia R00.1 427.89     Diagnoses and all orders for this visit:    1. 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 Hypothyroidism due to acquired atrophy of thyroid-recheck labs  -     T4, FREE  -     TSH 3RD GENERATION    4. Ischemic cardiomyopathy-needs to see Dr. Shanelle Arroyo again.  -     [facility]    5. Asymptomatic PVCs-not seen on EKG  -     AMB POC EKG ROUTINE W/ 12 LEADS, INTER & REP  -     [facility]    6. B12 deficiency-retest  -     VITAMIN B12    7. Vitamin D deficiency-retest  -     VITAMIN D, 25 HYDROXY    8. Bradycardia  -     AMB POC EKG ROUTINE W/ 12 LEADS, INTER & REP  -     Carolann Card [facility]            Plan of care:  Discussed diagnoses in detail with patient. Medication risks/benefits/side effects discussed with patient. All of the patient's questions were addressed. The patient understands and agrees with our plan of care. The patient knows to call back if they are unsure of or forget any changes we discussed today or if the symptoms change. The patient received an After-Visit Summary which contains VS, orders, medication list and allergy list. This can be used as a \"mini-medical record\" should they have to seek medical care while out of town. Patient Care Team:  Celia Pineda MD as PCP - Adina Epperson MD (Urology)    Follow-up Disposition:  Return in about 4 months (around 8/16/2018). No future appointments.     Signed By: Celia Pineda MD     April 16, 2018

## 2018-04-17 LAB
25(OH)D3+25(OH)D2 SERPL-MCNC: 62.8 NG/ML (ref 30–100)
ALBUMIN SERPL-MCNC: 4.7 G/DL (ref 3.5–4.8)
ALBUMIN/GLOB SERPL: 2 {RATIO} (ref 1.2–2.2)
ALP SERPL-CCNC: 54 IU/L (ref 39–117)
ALT SERPL-CCNC: 24 IU/L (ref 0–44)
AST SERPL-CCNC: 20 IU/L (ref 0–40)
BILIRUB SERPL-MCNC: 0.6 MG/DL (ref 0–1.2)
BUN SERPL-MCNC: 16 MG/DL (ref 8–27)
BUN/CREAT SERPL: 17 (ref 10–24)
CALCIUM SERPL-MCNC: 9.5 MG/DL (ref 8.6–10.2)
CHLORIDE SERPL-SCNC: 98 MMOL/L (ref 96–106)
CHOLEST SERPL-MCNC: 140 MG/DL (ref 100–199)
CO2 SERPL-SCNC: 25 MMOL/L (ref 18–29)
CREAT SERPL-MCNC: 0.92 MG/DL (ref 0.76–1.27)
EST. AVERAGE GLUCOSE BLD GHB EST-MCNC: 123 MG/DL
GFR SERPLBLD CREATININE-BSD FMLA CKD-EPI: 81 ML/MIN/1.73
GFR SERPLBLD CREATININE-BSD FMLA CKD-EPI: 93 ML/MIN/1.73
GLOBULIN SER CALC-MCNC: 2.3 G/DL (ref 1.5–4.5)
GLUCOSE SERPL-MCNC: 128 MG/DL (ref 65–99)
HBA1C MFR BLD: 5.9 % (ref 4.8–5.6)
HDLC SERPL-MCNC: 43 MG/DL
HGB BLD-MCNC: 14.8 G/DL (ref 13–17.7)
LDLC SERPL CALC-MCNC: 75 MG/DL (ref 0–99)
POTASSIUM SERPL-SCNC: 4.3 MMOL/L (ref 3.5–5.2)
PROT SERPL-MCNC: 7 G/DL (ref 6–8.5)
SODIUM SERPL-SCNC: 140 MMOL/L (ref 134–144)
T4 FREE SERPL-MCNC: 1.29 NG/DL (ref 0.82–1.77)
TRIGL SERPL-MCNC: 110 MG/DL (ref 0–149)
TSH SERPL DL<=0.005 MIU/L-ACNC: 2.89 UIU/ML (ref 0.45–4.5)
VIT B12 SERPL-MCNC: 540 PG/ML (ref 232–1245)
VLDLC SERPL CALC-MCNC: 22 MG/DL (ref 5–40)

## 2018-05-16 ENCOUNTER — OFFICE VISIT (OUTPATIENT)
Dept: CARDIOLOGY CLINIC | Age: 78
End: 2018-05-16

## 2018-05-16 VITALS
HEIGHT: 66 IN | BODY MASS INDEX: 29.73 KG/M2 | RESPIRATION RATE: 14 BRPM | HEART RATE: 64 BPM | SYSTOLIC BLOOD PRESSURE: 108 MMHG | DIASTOLIC BLOOD PRESSURE: 78 MMHG | OXYGEN SATURATION: 97 % | WEIGHT: 185 LBS

## 2018-05-16 DIAGNOSIS — I25.5 ISCHEMIC CARDIOMYOPATHY: ICD-10-CM

## 2018-05-16 DIAGNOSIS — I50.22 CHRONIC SYSTOLIC CONGESTIVE HEART FAILURE (HCC): Primary | ICD-10-CM

## 2018-05-16 DIAGNOSIS — E78.5 DYSLIPIDEMIA: ICD-10-CM

## 2018-05-16 DIAGNOSIS — E11.39 TYPE 2 DIABETES MELLITUS WITH OTHER OPHTHALMIC COMPLICATION, WITHOUT LONG-TERM CURRENT USE OF INSULIN (HCC): Chronic | ICD-10-CM

## 2018-05-16 NOTE — PROGRESS NOTES
Ankita Garcia, [address]  [address]cos Mcint[address], [address]  [place], [address] [facility]    Office [phone]  Fax [phone]  Cell [phone]        Naif Quiroga is a 69 y.o. male Last seen 3 years ago. Assessment  Encounter Diagnoses   Name Primary?  Chronic systolic congestive heart failure ([address] Yes    Ischemic cardiomyopathy     Type 2 diabetes mellitus with other ophthalmic complication, without long-term current use of insulin ([address]     Dyslipidemia        Recommendations:    Mr. Rambo Mccormick has an ischemic cardiomyopathy without obstructive CAD. Cardiac MRI 2014 showed evidence of RCA territory infarction without viability. He has no sx of angina or HF at a good functional capacity (stage B). Echo 2015 demonstrated persistent, severe LV dysfunction, EF 30%. He has class 2 HF at a good functional capacity. Bradycardia and low BP preclude optimization of LV dysfunction Rx - continue current doses of Carvedilol and Trandolapril. Reassess LV function with echo in the near future. We discussed ICD implantation for SCD prevention 3 years ago. 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 He has backed off some of that and is willing to reconsider. Will refer him to Dr. Macarena Nelson if updated echo demonstrates LVEF <35%. Phone follow up after reviewing tests. Subjective:    Mr. Rambo Mccormick feels well overall with no interval issues. He stays active with no exertional sxs as long as he paces himself. 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 He tries not to lift heavy items by himself. Patient denies any claudication exertional chest pain, dyspnea, palpitations, syncope, orthopnea, edema or paroxysmal nocturnal dyspnea. Blood sugars have been in the 120-123 range at home. Pt is retired.      Cardiac testing  Echo 9/2013 - global HK, EF 30-35%  Stress echo 9/2013- poor exercise capacity, resting global HK, EF 30%, no enhancement of EF with exercise  Cath 9/24/2013 - EDP 16, no AV gradient, LV dilated, EF 25-30% global, LM large nl, LAD mild plaque, LCX nl, RCA normal.    Echo 5/15/2014 - EF 30-35%, global HK    Cardiac MRI 6/2014 - Dense medium size subendocardial infarct of the basal and mid   inferolateral and lateral walls (primarily mid segments) with myocardial   wall thinning and fatty metaplasia of the infarct suggest chronic non   reperfusing infarct. No significant viability in these segments. All other   segments are completely viable. LVEF 42%. Echo 1/5/15 - EF 30-35%. inferior and inferolateral akinesis, mild MR    Past Medical History:   Diagnosis Date    Cardiomyopathy, dilated, nonischemic (HCC)     Colon polyps     Hypercholesterolemia     Prostate cancer ([facility]     [address], watch and wait strategy    [facility] spotted fever)     T2DM (type 2 diabetes mellitus) ([facility] 8/21/2013        Current Outpatient Prescriptions   Medication Sig Dispense Refill    carvedilol (COREG) 3.125 mg tablet TAKE 1 TABLET TWICE A DAY WITH MEALS 180 Tab 3    trandolapril (MAVIK) 2 mg tablet TAKE 1 TABLET NIGHTLY FOR LEFT VENTRICULAR DYSFUNCTION FOLLOWING MI 90 Tab 3    levothyroxine (SYNTHROID) 50 mcg tablet TAKE 1 TABLET DAILY BEFORE BREAKFAST 90 Tab 3    glucose blood VI test strips (FREESTYLE LITE STRIPS) strip Dx E11.9  testing once a day 100 Strip 5    simvastatin (ZOCOR) 20 mg tablet TAKE 1 TABLET NIGHTLY 90 Tab 2    FREESTYLE LANCETS 28 gauge [facility] USE TO TEST DAILY 100 Lancet 4    aspirin delayed-release 81 mg tablet Take  by mouth daily.  Cholecalciferol, Vitamin D3, (VITAMIN D3) 1,000 unit cap Take  by mouth daily.  fish oil-dha-epa (FISH OIL) 1,200-144-216 mg Cap Take  by mouth.  VITAMIN B COMPLEX (B COMPLEX PO) Take  by mouth.  PV W-O MIKO/FERROUS FUMARATE/FA (M-VIT PO) Take  by mouth.          Allergies   Allergen Reactions    Pcn [Penicillins] Rash and Swelling    Robitussin [Guaifenesin] Hives      Retired. Review of Systems  Constitutional: Negative for fever, chills, malaise/fatigue and diaphoresis. Respiratory: Negative for cough, hemoptysis, sputum production, shortness of breath and wheezing. Cardiovascular: Negative for chest pain, palpitations, orthopnea, claudication, leg swelling and PND. Gastrointestinal: Negative for heartburn, nausea, vomiting, blood in stool and melena. Genitourinary: Negative for dysuria and flank pain. Musculoskeletal: Negative for joint pain and back pain. Skin: Negative for rash. Neurological: Negative for focal weakness, seizures, loss of consciousness, weakness and headaches. Endo/Heme/Allergies: Does not bruise/bleed easily. Psychiatric/Behavioral: Negative for memory loss. The patient does not have insomnia. Physical Exam    Visit Vitals    /78 (BP 1 Location: Left arm, BP Patient Position: Sitting)    Pulse 64    Resp 14    Ht 5' 6\" (1.676 m)    Wt 185 lb (83.9 kg)    SpO2 97%    BMI 29.86 kg/m2     Wt Readings from Last 3 Encounters:   05/16/18 185 lb (83.9 kg)   04/16/18 190 lb (86.2 kg)   12/15/17 190 lb (86.2 kg)      General - well developed well nourished  Neck - JVP normal, thyroid nl  Cardiac - normal S1, S2, no murmurs, rubs or gallops. No clicks  Vascular - carotids without bruits, radials, femorals and pedal pulses equal bilateral  Lungs - clear to auscultation bilaterals, no rales, wheezing or rhonchi  Abd - soft nontender, no HSM, no abd bruits  Extremities - no edema  Skin - no rash  Neuro - nonfocal  Psych - normal mood and affect      Cardiographics  EKG 11/2014 - Sinus rhythm with PVCs, mild IVCD  Echo 1/5/15 - EF 30-35%.  inferior and inferolateral akinesis, mild MR  EKG 4/16/18- SR, 1st degree AV block , LAHB, IVCD      Written by [employer], as dictated by Jericho Guadalupe MD.  Jericho Guadalupe MD

## 2018-05-16 NOTE — PROGRESS NOTES
Chief Complaint   Patient presents with    CHF     stage B    Follow-up     asymptomatic PVCs    Cardiomyopathy     ISCHEMIC     1. Have you been to the ER, urgent care clinic since your last visit? Hospitalized since your last visit? No    2. Have you seen or consulted any other health care providers outside of the [address] [place] [address] since your last visit? Include any pap smears or colon screening.  No   Visit Vitals    /78 (BP 1 Location: Left arm, BP Patient Position: Sitting)    Pulse 64    Resp 14    Ht 5' 6\" (1.676 m)    Wt 185 lb (83.9 kg)    SpO2 97%    BMI 29.86 kg/m2

## 2018-05-16 NOTE — MR AVS SNAPSHOT
[address] 
[phone] Patient: Holden Salmeron MRN:  [id number] Visit Information Date & Time Provider Department Dept. Phone Encounter #  
 5/16/2018 10:00 AM Kirsten Almeida MD [facility] [place] [phone] [id number] Your Appointments 8/17/2018  8:20 AM  
ROUTINE CARE with Edison Valdez MD  
[address] [place] [address] Appt Note: 4 mnth fu est pt/refills/labs/  
 [address] [address]  
[place] [address] [address] Upcoming Health Maintenance Date Due  
 EYE EXAM RETINAL OR DILATED Q1 12/19/2017 Influenza Age 5 to Adult 8/1/2018 FOOT EXAM Q1 8/14/2018 MICROALBUMIN Q1 8/14/2018 HEMOGLOBIN A1C Q6M 10/16/2018 MEDICARE YEARLY EXAM 11/29/2018 GLAUCOMA SCREENING Q2Y 12/19/2018 LIPID PANEL Q1 4/16/2019 DTaP/Tdap/Td series (2 - Td) 5/17/2021 COLONOSCOPY 12/12/2021 Allergies as of 5/16/2018  Review Complete On: 5/16/2018 By: Delois Bloch, LPN Severity Noted Reaction Type Reactions Pcn [Penicillins]  04/05/2010    Rash, Swelling Robitussin [Guaifenesin]  04/05/2010    Hives Current Immunizations  Reviewed on 5/16/2018 Name Date H1N1 FLU VACCINE 1/20/2010 Influenza High Dose Vaccine PF 9/20/2017 Influenza Vaccine 10/9/2015, 11/17/2014, 9/23/2013 Influenza Vaccine (Quad) PF 9/26/2016 Influenza Vaccine Split 11/2/2012, 10/25/2011, 12/8/2009 Pneumococcal Conjugate (PCV-13) 8/13/2015 TD Vaccine 8/17/2001 TDAP Vaccine 5/17/2011 ZZZ-RETIRED (DO NOT USE) Pneumococcal Vaccine (Unspecified Type) 12/5/2007, 12/4/1996 Zoster Vaccine, Live 7/26/2013 Reviewed by Delois Bloch, LPN on 7/83/1956 at  9:42 AM  
You Were Diagnosed With   
  
 Codes Comments Chronic systolic congestive heart failure (HCC)    -  Primary ICD-10-CM: A97.54 ICD-9-CM: 428.22, 428.0 Ischemic cardiomyopathy     ICD-10-CM: I25.5 ICD-9-CM: 414.8 Type 2 diabetes mellitus with other ophthalmic complication, without long-term current use of insulin (HCC)     ICD-10-CM: E11.39 
ICD-9-CM: 250.50 Dyslipidemia     ICD-10-CM: E78.5 ICD-9-CM: 272.4 Vitals BP Pulse Resp Height(growth percentile) Weight(growth percentile) SpO2  
 108/78 (BP 1 Location: Left arm, BP Patient Position: Sitting) 64 14 5' 6\" (1.676 m) 185 lb (83.9 kg) 97% BMI Smoking Status 29.86 kg/m2 Never Smoker Vitals History BMI and BSA Data Body Mass Index Body Surface Area  
 29.86 kg/m 2 1.98 m 2 Preferred Pharmacy Pharmacy Name Phone [address] [facility] [phone] Your Updated Medication List  
  
   
This list is accurate as of 5/16/18 10:10 AM.  Always use your most recent med list.  
  
  
  
  
 aspirin delayed-release 81 mg tablet Take  by mouth daily. B COMPLEX PO Take  by mouth. carvedilol 3.125 mg tablet Commonly known as:  COREG  
TAKE 1 TABLET TWICE A DAY WITH MEALS FISH OIL 1,200-144-216 mg Cap Generic drug:  fish oil-dha-epa Take  by mouth. FREESTYLE LANCETS 28 gauge Misc Generic drug:  lancets USE TO TEST DAILY  
  
 glucose blood VI test strips strip Commonly known as:  FREESTYLE LITE STRIPS Dx E11.9  testing once a day  
  
 levothyroxine 50 mcg tablet Commonly known as:  SYNTHROID  
TAKE 1 TABLET DAILY BEFORE BREAKFAST  
  
 M-VIT PO Take  by mouth. simvastatin 20 mg tablet Commonly known as:  ZOCOR  
TAKE 1 TABLET NIGHTLY  
  
 trandolapril 2 mg tablet Commonly known as:  MAVIK  
TAKE 1 TABLET NIGHTLY FOR LEFT VENTRICULAR DYSFUNCTION FOLLOWING MI  
  
 VITAMIN D3 1,000 unit Cap Generic drug:  cholecalciferol Take  by mouth daily. Introducing [facility]! [facility] introduces [employer] patient portal. Now you can access parts of your medical record, email your doctor's office, and request medication refills online. 1. In your internet browser, go to [email or web address] 2. Click on the First Time User? Click Here link in the Sign In box. You will see the New Member Sign Up page. 3. Enter your [employer]ez Access Code exactly as it appears below. You will not need to use this code after youve completed the sign-up process. If you do not sign up before the expiration date, you must request a new code. · [employer] Access Code: [email or web address] Expires: 7/15/2018  8:55 AM 
 
4. Enter the last four digits of your Social Security Number (xxxx) and Date of Birth (mm/dd/yyyy) as indicated and click Submit. You will be taken to the next sign-up page. 5. Create a Skweez ID. This will be your [employer]ez login ID and cannot be changed, so think of one that is secure and easy to remember. 6. Create a [employer]ez password. You can change your password at any time. 7. Enter your Password Reset Question and Answer. This can be used at a later time if you forget your password. 8. Enter your e-mail address. You will receive e-mail notification when new information is available in [address] 9. Click Sign Up. You can now view and download portions of your medical record. 10. Click the Download Summary menu link to download a portable copy of your medical information. If you have questions, please visit the Frequently Asked Questions section of the [employer] website. Remember, [employer] is NOT to be used for urgent needs. For medical emergencies, dial 911. Now available from your iPhone and Android! Please provide this summary of care documentation to your next provider. Your primary care clinician is listed as [address].  If you have any questions after today's visit, please call [phone].

## 2018-05-24 ENCOUNTER — CLINICAL SUPPORT (OUTPATIENT)
Dept: CARDIOLOGY CLINIC | Age: 78
End: 2018-05-24

## 2018-05-24 DIAGNOSIS — E78.5 DYSLIPIDEMIA: ICD-10-CM

## 2018-05-24 DIAGNOSIS — E11.39 TYPE 2 DIABETES MELLITUS WITH OTHER OPHTHALMIC COMPLICATION, WITHOUT LONG-TERM CURRENT USE OF INSULIN (HCC): Chronic | ICD-10-CM

## 2018-05-24 DIAGNOSIS — I50.22 CHRONIC SYSTOLIC CONGESTIVE HEART FAILURE (HCC): ICD-10-CM

## 2018-05-24 DIAGNOSIS — I25.5 ISCHEMIC CARDIOMYOPATHY: ICD-10-CM

## 2018-05-31 ENCOUNTER — TELEPHONE (OUTPATIENT)
Dept: CARDIOLOGY CLINIC | Age: 78
End: 2018-05-31

## 2018-05-31 NOTE — TELEPHONE ENCOUNTER
Cardiac testing  Echo 9/2013 - global HK, EF 30-35%  Stress echo 9/2013- poor exercise capacity, resting global HK, EF 30%, no enhancement of EF with exercise  Cath 9/24/2013 - EDP 16, no AV gradient, LV dilated, EF 25-30% global, LM large nl, LAD mild plaque, LCX nl, RCA normal.    Echo 5/15/2014 - EF 30-35%, global HK    Cardiac MRI 6/2014 - Dense medium size subendocardial infarct of the basal and mid   inferolateral and lateral walls (primarily mid segments) with myocardial   wall thinning and fatty metaplasia of the infarct suggest chronic non   reperfusing infarct. No significant viability in these segments. All other   segments are completely viable. LVEF 42%. Echo 1/5/15 - EF 30-35%. inferior and inferolateral akinesis, mild MR    Echo 5/24/18 - EF 35-40%. Mod HK of basal inferior walls. Mild LVH. Mild TR. AoV sclerosis without stenosis. Aortic root mild dilatation; ascending aorta 3.9cm. Improvement in LF function when compared to study 1/2015. I have notified Mr. Kenna Melton of his Echo results. Note interval improvement in EF. Will defer EF referral at this time. Advised that he continue current medication regimen  In addition to a low sodium diet. Encouraged the he monitor weight at home and for any progression in HF symptoms. Will ask that he return to the office in 6 months, but encouraged him to call us back with any questions or concerns prior to that time. Will plan to repeat Echo again in 1 year.

## 2018-08-08 RX ORDER — SIMVASTATIN 20 MG/1
TABLET, FILM COATED ORAL
Qty: 90 TAB | Refills: 2 | Status: SHIPPED | OUTPATIENT
Start: 2018-08-08 | End: 2019-05-05 | Stop reason: SDUPTHER

## 2018-08-17 ENCOUNTER — OFFICE VISIT (OUTPATIENT)
Dept: FAMILY MEDICINE CLINIC | Age: 78
End: 2018-08-17

## 2018-08-17 VITALS
BODY MASS INDEX: 29.25 KG/M2 | RESPIRATION RATE: 20 BRPM | SYSTOLIC BLOOD PRESSURE: 129 MMHG | HEIGHT: 66 IN | TEMPERATURE: 98.4 F | WEIGHT: 182 LBS | OXYGEN SATURATION: 95 % | DIASTOLIC BLOOD PRESSURE: 85 MMHG | HEART RATE: 70 BPM

## 2018-08-17 DIAGNOSIS — C61 PROSTATE CANCER (HCC): Chronic | ICD-10-CM

## 2018-08-17 DIAGNOSIS — I50.22 CHRONIC SYSTOLIC CONGESTIVE HEART FAILURE (HCC): ICD-10-CM

## 2018-08-17 DIAGNOSIS — E08.3291 MILD NONPROLIFERATIVE DIABETIC RETINOPATHY OF RIGHT EYE WITHOUT MACULAR EDEMA ASSOCIATED WITH DIABETES MELLITUS DUE TO UNDERLYING CONDITION (HCC): Primary | Chronic | ICD-10-CM

## 2018-08-17 DIAGNOSIS — I49.3 ASYMPTOMATIC PVCS: Chronic | ICD-10-CM

## 2018-08-17 DIAGNOSIS — E11.39 TYPE 2 DIABETES MELLITUS WITH OTHER OPHTHALMIC COMPLICATION, WITHOUT LONG-TERM CURRENT USE OF INSULIN (HCC): Chronic | ICD-10-CM

## 2018-08-17 DIAGNOSIS — E03.4 HYPOTHYROIDISM DUE TO ACQUIRED ATROPHY OF THYROID: Chronic | ICD-10-CM

## 2018-08-17 NOTE — MR AVS SNAPSHOT
[place] 
 
 
 [address] [address] 
[phone] Patient: Letha Bills MRN: [id number] EM:1[birth year] Visit Information Date & Time Provider Department Dept. Phone Encounter #  
 8/17/2018  8:20 AM Cheo Watkins MD [address] [id number] Follow-up Instructions Return in about 4 months (around 12/17/2018). Your Appointments 11/26/2018  3:40 PM  
ESTABLISHED PATIENT with Janna Dubois MD  
[facility] ([address]) Appt Note: Dr Tapan Mesa 6 Month Follow up per Cale Vargas CC Note [address] [facility] [address] [facility] [address]  
[phone]  
  
   
 [address] [facility] [address] [facility] [address]  
  
    
 12/19/2018 10:45 AM  
Medicare Physical with Cheo Watkins MD  
704 [facility] ([address]) Appt Note: [address] [address]  
[place] [address] [address] Upcoming Health Maintenance Date Due  
 EYE EXAM RETINAL OR DILATED Q1 12/19/2017 Influenza Age 5 to Adult 8/1/2018 HEMOGLOBIN A1C Q6M 10/16/2018 MEDICARE YEARLY EXAM 11/29/2018 GLAUCOMA SCREENING Q2Y 12/19/2018 LIPID PANEL Q1 4/16/2019 FOOT EXAM Q1 8/17/2019 MICROALBUMIN Q1 8/17/2019 DTaP/Tdap/Td series (2 - Td) 5/17/2021 COLONOSCOPY 12/12/2021 Allergies as of 8/17/2018  Review Complete On: 8/17/2018 By: Angeline Chew LPN Severity Noted Reaction Type Reactions Pcn [Penicillins]  04/05/2010    Rash, Swelling Robitussin [Guaifenesin]  04/05/2010    Hives Current Immunizations  Reviewed on 5/16/2018 Name Date H1N1 FLU VACCINE 1/20/2010 Influenza High Dose Vaccine PF 9/20/2017 Influenza Vaccine 10/9/2015, 11/17/2014, 9/23/2013 Influenza Vaccine (Quad) PF 9/26/2016 Influenza Vaccine Split 11/2/2012, 10/25/2011, 12/8/2009 Pneumococcal Conjugate (PCV-13) 8/13/2015 TD Vaccine 8/17/2001 TDAP Vaccine 5/17/2011 ZZZ-RETIRED (DO NOT USE) Pneumococcal Vaccine (Unspecified Type) 12/5/2007, 12/4/1996 Zoster Vaccine, Live 7/26/2013 Not reviewed this visit You Were Diagnosed With   
  
 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 
ICD-9-CM: 250.50 Prostate cancer [facility])     ICD-10-CM: X76 ICD-9-CM: 943 Chronic systolic congestive heart failure (HCC)     ICD-10-CM: I50.22 ICD-9-CM: 428.22, 428.0 Hypothyroidism due to acquired atrophy of thyroid     ICD-10-CM: E03.4 ICD-9-CM: 244.8, 246.8 Asymptomatic PVCs     ICD-10-CM: I49.3 ICD-9-CM: 427.69 Vitals BP Pulse Temp Resp Height(growth percentile) Weight(growth percentile) 129/85 (BP 1 Location: Right arm, BP Patient Position: Sitting) 70 98.4 °F (36.9 °C) (Oral) 20 5' 6\" (1.676 m) 182 lb (82.6 kg) SpO2 BMI Smoking Status 95% 29.38 kg/m2 Never Smoker Vitals History BMI and BSA Data Body Mass Index Body Surface Area  
 29.38 kg/m 2 1.96 m 2 Preferred Pharmacy Pharmacy Name Phone Yulia Perez, [facility] [phone] Your Updated Medication List  
  
   
This list is accurate as of 8/17/18  8:49 AM.  Always use your most recent med list.  
  
  
  
  
 aspirin delayed-release 81 mg tablet Take  by mouth daily. B COMPLEX PO Take  by mouth. carvedilol 3.125 mg tablet Commonly known as:  COREG  
TAKE 1 TABLET TWICE A DAY WITH MEALS FISH OIL 1,200-144-216 mg Cap Generic drug:  fish oil-dha-epa Take  by mouth. FREESTYLE LANCETS 28 gauge Misc Generic drug:  lancets USE TO TEST DAILY  
  
 glucose blood VI test strips strip Commonly known as:  FREESTYLE LITE STRIPS Dx E11.9  testing once a day  
  
 levothyroxine 50 mcg tablet Commonly known as:  SYNTHROID  
TAKE 1 TABLET DAILY BEFORE BREAKFAST  
  
 M-VIT PO Take  by mouth. simvastatin 20 mg tablet Commonly known as:  ZOCOR  
TAKE 1 TABLET NIGHTLY  
  
 trandolapril 2 mg tablet Commonly known as:  MAVIK  
TAKE 1 TABLET NIGHTLY FOR LEFT VENTRICULAR DYSFUNCTION FOLLOWING MI  
  
 VITAMIN D3 1,000 unit Cap Generic drug:  cholecalciferol Take  by mouth daily. We Performed the Following CBC WITH AUTOMATED DIFF [10733 CPT(R)] HEMOGLOBIN A1C WITH EAG [56430 CPT(R)] LIPID PANEL [96936 CPT(R)] METABOLIC PANEL, COMPREHENSIVE [26142 CPT(R)] MICROALBUMIN, UR, RAND W/ MICROALB/CREAT RATIO [id number] CPT(R)] T4, FREE [id number] CPT(R)] Follow-up Instructions Return in about 4 months (around 12/17/2018). Patient Instructions Learning About Meal Planning for Diabetes Why plan your meals? Meal planning can be a key part of managing diabetes. Planning meals and snacks with the right balance of carbohydrate, protein, and fat can help you keep your blood sugar at the target level you set with your doctor. You don't have to eat special foods. You can eat what your family eats, including sweets once in a while. But you do have to pay attention to how often you eat and how much you eat of certain foods. You may want to work with a dietitian or a certified diabetes educator. He or she can give you tips and meal ideas and can answer your questions about meal planning. This health professional can also help you reach a healthy weight if that is one of your goals. What plan is right for you? Your dietitian or diabetes educator may suggest that you start with the plate format or carbohydrate counting. The plate format The plate format is a simple way to help you manage how you eat. You plan meals by learning how much space each food should take on a plate. Using the plate format helps you spread carbohydrate throughout the day. It can make it easier to keep your blood sugar level within your target range. It also helps you see if you're eating healthy portion sizes. To use the plate format, you put non-starchy vegetables on half your plate. Add meat or meat substitutes on one-quarter of the plate. Put a grain or starchy vegetable (such as brown rice or a potato) on the final quarter of the plate. You can add a small piece of fruit and some low-fat or fat-free milk or yogurt, depending on your carbohydrate goal for each meal. 
Here are some tips for using the plate format: · Make sure that you are not using an oversized plate. A 9-inch plate is best. Many restaurants use larger plates. · Get used to using the plate format at home. Then you can use it when you eat out. · Write down your questions about using the plate format. Talk to your doctor, a dietitian, or a diabetes educator about your concerns. Carbohydrate counting With carbohydrate counting, you plan meals based on the amount of carbohydrate in each food. Carbohydrate raises blood sugar higher and more quickly than any other nutrient. It is found in desserts, breads and cereals, and fruit. It's also found in starchy vegetables such as potatoes and corn, grains such as rice and pasta, and milk and yogurt. Spreading carbohydrate throughout the day helps keep your blood sugar levels within your target range. Your daily amount depends on several things, including your weight, how active you are, which diabetes medicines you take, and what your goals are for your blood sugar levels.  A registered dietitian or diabetes educator can help you plan how much carbohydrate to include in each meal and snack. A guideline for your daily amount of carbohydrate is: · 45 to 60 grams at each meal. That's about the same as 3 to 4 carbohydrate servings. · 15 to 20 grams at each snack. That's about the same as 1 carbohydrate serving. The Nutrition Facts label on packaged foods tells you how much carbohydrate is in a serving of the food. First, look at the serving size on the food label. Is that the amount you eat in a serving? All of the nutrition information on a food label is based on that serving size. So if you eat more or less than that, you'll need to adjust the other numbers. Total carbohydrate is the next thing you need to look for on the label. If you count carbohydrate servings, one serving of carbohydrate is 15 grams. For foods that don't come with labels, such as fresh fruits and vegetables, you'll need a guide that lists carbohydrate in these foods. Ask your doctor, dietitian, or diabetes educator about books or other nutrition guides you can use. If you take insulin, you need to know how many grams of carbohydrate are in a meal. This lets you know how much rapid-acting insulin to take before you eat. If you use an insulin pump, you get a constant rate of insulin during the day. So the pump must be programmed at meals to give you extra insulin to cover the rise in blood sugar after meals. When you know how much carbohydrate you will eat, you can take the right amount of insulin. Or, if you always use the same amount of insulin, you need to make sure that you eat the same amount of carbohydrate at meals. If you need more help to understand carbohydrate counting and food labels, ask your doctor, dietitian, or diabetes educator. How do you get started with meal planning? Here are some tips to get started: 
· Plan your meals a week at a time. Don't forget to include snacks too. · Use cookbooks or online recipes to plan several main meals. Plan some quick meals for busy nights. You also can double some recipes that freeze well. Then you can save half for other busy nights when you don't have time to cook. · Make sure you have the ingredients you need for your recipes. If you're running low on basic items, put these items on your shopping list too. · List foods that you use to make breakfasts, lunches, and snacks. List plenty of fruits and vegetables. · Post this list on the refrigerator. Add to it as you think of more things you need. · Take the list to the store to do your weekly shopping. Follow-up care is a key part of your treatment and safety. Be sure to make and go to all appointments, and call your doctor if you are having problems. It's also a good idea to know your test results and keep a list of the medicines you take. Where can you learn more? Go to http://mabel-maliha.info/. Lyn Olivo in the search box to learn more about \"Learning About Meal Planning for Diabetes. \" Current as of: December 7, 2017 Content Version: 11.7 © 3183-5781 [employer], [employer]. Care instructions adapted under license by [employer] (which disclaims liability or warranty for this information). If you have questions about a medical condition or this instruction, always ask your healthcare professional. [place] [address] any warranty or liability for your use of this information. Introducing [facility] & HE[facility]! [employer] [place] introduces [employer] patient portal. Now you can access parts of your medical record, email your doctor's office, and request medication refills online. 1. In your internet browser, go to [email or web address] 2. Click on the First Time User? Click Here link in the Sign In box. You will see the New Member Sign Up page. 3. Enter your [employer] Access Code exactly as it appears below.  You will not need to use this code after youve completed the sign-up process. If you do not sign up before the expiration date, you must request a new code. · [employer] Access Code: [email or web address] Expires: 11/15/2018  8:49 AM 
 
4. Enter the last four digits of your Social Security Number (xxxx) and Date of Birth (mm/dd/yyyy) as indicated and click Submit. You will be taken to the next sign-up page. 5. Create a [employer] ID. This will be your [employer] login ID and cannot be changed, so think of one that is secure and easy to remember. 6. Create a [employer] password. You can change your password at any time. 7. Enter your Password Reset Question and Answer. This can be used at a later time if you forget your password. 8. Enter your e-mail address. You will receive e-mail notification when new information is available in [address]. 9. Click Sign Up. You can now view and download portions of your medical record. 10. Click the Download Summary menu link to download a portable copy of your medical information. If you have questions, please visit the Frequently Asked Questions section of the [employer] website. Remember, [employer] is NOT to be used for urgent needs. For medical emergencies, dial 911. Now available from your iPhone and Android! Please provide this summary of care documentation to your next provider. Your primary care clinician is listed as [address]. If you have any questions after today's visit, please call [phone].

## 2018-08-17 NOTE — PROGRESS NOTES
1. Have you been to the ER, urgent care clinic since your last visit? Hospitalized since your last visit? No    2. Have you seen or consulted any other health care providers outside of the [address] [place] [address] since your last visit? Include any pap smears or colon screening. No  Reviewed record in preparation for visit and have necessary documentation  Pt did not bring medication to office visit for review  Goals that were addressed and/or need to be completed during or after this appointment include   Health Maintenance Due   Topic Date Due    EYE EXAM RETINAL OR DILATED Q1  12/19/2017    Influenza Age 5 to Adult  08/01/2018    FOOT EXAM Q1  08/14/2018    MICROALBUMIN Q1  08/14/2018     Pt states eye exam in January - does not recall dr. Choudhury at this time.

## 2018-08-17 NOTE — PATIENT INSTRUCTIONS

## 2018-08-18 LAB
ALBUMIN SERPL-MCNC: 4.7 G/DL (ref 3.5–4.8)
ALBUMIN/CREAT UR: 8.1 MG/G CREAT (ref 0–30)
ALBUMIN/GLOB SERPL: 2.2 {RATIO} (ref 1.2–2.2)
ALP SERPL-CCNC: 56 IU/L (ref 39–117)
ALT SERPL-CCNC: 19 IU/L (ref 0–44)
AST SERPL-CCNC: 20 IU/L (ref 0–40)
BASOPHILS # BLD AUTO: 0 X10E3/UL (ref 0–0.2)
BASOPHILS NFR BLD AUTO: 0 %
BILIRUB SERPL-MCNC: 0.6 MG/DL (ref 0–1.2)
BUN SERPL-MCNC: 14 MG/DL (ref 8–27)
BUN/CREAT SERPL: 14 (ref 10–24)
CALCIUM SERPL-MCNC: 9.6 MG/DL (ref 8.6–10.2)
CHLORIDE SERPL-SCNC: 102 MMOL/L (ref 96–106)
CHOLEST SERPL-MCNC: 120 MG/DL (ref 100–199)
CO2 SERPL-SCNC: 22 MMOL/L (ref 20–29)
CREAT SERPL-MCNC: 0.99 MG/DL (ref 0.76–1.27)
CREAT UR-MCNC: 113.4 MG/DL
EOSINOPHIL # BLD AUTO: 0.1 X10E3/UL (ref 0–0.4)
EOSINOPHIL NFR BLD AUTO: 2 %
ERYTHROCYTE [DISTWIDTH] IN BLOOD BY AUTOMATED COUNT: 12.7 % (ref 12.3–15.4)
EST. AVERAGE GLUCOSE BLD GHB EST-MCNC: 120 MG/DL
GLOBULIN SER CALC-MCNC: 2.1 G/DL (ref 1.5–4.5)
GLUCOSE SERPL-MCNC: 121 MG/DL (ref 65–99)
HBA1C MFR BLD: 5.8 % (ref 4.8–5.6)
HCT VFR BLD AUTO: 43.3 % (ref 37.5–51)
HDLC SERPL-MCNC: 43 MG/DL
HGB BLD-MCNC: 14.7 G/DL (ref 13–17.7)
IMM GRANULOCYTES # BLD: 0 X10E3/UL (ref 0–0.1)
IMM GRANULOCYTES NFR BLD: 0 %
LDLC SERPL CALC-MCNC: 62 MG/DL (ref 0–99)
LYMPHOCYTES # BLD AUTO: 1.6 X10E3/UL (ref 0.7–3.1)
LYMPHOCYTES NFR BLD AUTO: 23 %
MCH RBC QN AUTO: 32.2 PG (ref 26.6–33)
MCHC RBC AUTO-ENTMCNC: 33.9 G/DL (ref 31.5–35.7)
MCV RBC AUTO: 95 FL (ref 79–97)
MICROALBUMIN UR-MCNC: 9.2 UG/ML
MONOCYTES # BLD AUTO: 0.5 X10E3/UL (ref 0.1–0.9)
MONOCYTES NFR BLD AUTO: 7 %
NEUTROPHILS # BLD AUTO: 4.8 X10E3/UL (ref 1.4–7)
NEUTROPHILS NFR BLD AUTO: 68 %
PLATELET # BLD AUTO: 212 X10E3/UL (ref 150–379)
POTASSIUM SERPL-SCNC: 4.3 MMOL/L (ref 3.5–5.2)
PROT SERPL-MCNC: 6.8 G/DL (ref 6–8.5)
RBC # BLD AUTO: 4.56 X10E6/UL (ref 4.14–5.8)
SODIUM SERPL-SCNC: 140 MMOL/L (ref 134–144)
T4 FREE SERPL-MCNC: 1.21 NG/DL (ref 0.82–1.77)
TRIGL SERPL-MCNC: 76 MG/DL (ref 0–149)
VLDLC SERPL CALC-MCNC: 15 MG/DL (ref 5–40)
WBC # BLD AUTO: 7.1 X10E3/UL (ref 3.4–10.8)

## 2018-09-17 ENCOUNTER — CLINICAL SUPPORT (OUTPATIENT)
Dept: FAMILY MEDICINE CLINIC | Age: 78
End: 2018-09-17

## 2018-09-17 VITALS — TEMPERATURE: 98.7 F

## 2018-09-17 DIAGNOSIS — Z23 ENCOUNTER FOR IMMUNIZATION: Primary | ICD-10-CM

## 2018-09-17 NOTE — PROGRESS NOTES
Pt in office today for flu shot only. Denies any recent illness or reason to not be able to receive flu shot. Tolerated well.

## 2018-11-26 ENCOUNTER — OFFICE VISIT (OUTPATIENT)
Dept: CARDIOLOGY CLINIC | Age: 78
End: 2018-11-26

## 2018-11-26 VITALS
HEART RATE: 66 BPM | WEIGHT: 185.8 LBS | DIASTOLIC BLOOD PRESSURE: 86 MMHG | OXYGEN SATURATION: 96 % | RESPIRATION RATE: 18 BRPM | HEIGHT: 66 IN | SYSTOLIC BLOOD PRESSURE: 138 MMHG | BODY MASS INDEX: 29.86 KG/M2

## 2018-11-26 DIAGNOSIS — E78.5 DYSLIPIDEMIA: ICD-10-CM

## 2018-11-26 DIAGNOSIS — I25.5 ISCHEMIC CARDIOMYOPATHY: ICD-10-CM

## 2018-11-26 DIAGNOSIS — I50.22 CHRONIC SYSTOLIC CONGESTIVE HEART FAILURE (HCC): Primary | ICD-10-CM

## 2018-11-26 DIAGNOSIS — E11.39 TYPE 2 DIABETES MELLITUS WITH OTHER OPHTHALMIC COMPLICATION, WITHOUT LONG-TERM CURRENT USE OF INSULIN (HCC): ICD-10-CM

## 2018-11-26 NOTE — PROGRESS NOTES
Ankita Jeronimo, [address]  [address]  [place], [address] [facility]    Office [phone]  Fax [phone]  Cell [phone]        Ramesh Penaloza is a 69 y.o. male Last seen 6 months ago. Assessment  Encounter Diagnoses   Name Primary?  Chronic systolic congestive heart failure ([address] Yes    Ischemic cardiomyopathy     Type 2 diabetes mellitus with other ophthalmic complication, without long-term current use of insulin ([facility] [address]     Dyslipidemia        Recommendations:  Mr. Lawrence Cruz has an ischemic cardiomyopathy without obstructive CAD. Cardiac MRI 2014 showed evidence of RCA territory infarction without viability. Updated echo 6 months ago demonstrated EF 35-40%. He has stage B HF at a good functional capacity. He is on good medical therapy, limited by bradycardia and lowish BP. Continue current doses of Carvedilol and Trandolapril. Reassess LV function with echo in 6 months. Lipids and DM managed by Gab Haley MD. A1c from 8/2018 was 5.8%. LDL 62. Overall his quality of life is very good. Follow-up Disposition:  Return in about 6 months (around 5/26/2019). Subjective:  Mr. Lawrence Cruz states he is doing well. He is retired. 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 Patient denies any exertional chest pain, dyspnea, palpitations, syncope, orthopnea, edema or paroxysmal nocturnal dyspnea. He is adherent with his medications.      Cardiac testing  Echo 9/2013 - global HK, EF 30-35%  Stress echo 9/2013- poor exercise capacity, resting global HK, EF 30%, no enhancement of EF with exercise  Cath 9/24/2013 - EDP 16, no AV gradient, LV dilated, EF 25-30% global, LM large nl, LAD mild plaque, LCX nl, RCA normal.    Echo 5/15/2014 - EF 30-35%, global HK    Cardiac MRI 6/2014 - Dense medium size subendocardial infarct of the basal and mid   inferolateral and lateral walls (primarily mid segments) with myocardial   wall thinning and fatty metaplasia of the infarct suggest chronic non   reperfusing infarct. No significant viability in these segments. All other   segments are completely viable. LVEF 42%. Echo 1/5/15 - EF 30-35%. inferior and inferolateral akinesis, mild MR    Echo 5/24/18 - EF 35-40%. Mod HK of basal inferior walls. Mild LVH. Mild TR. AoV sclerosis without stenosis. Aortic root mild dilatation; ascending aorta 3.9cm. Improvement in LF function when compared to study 1/2015. Past Medical History:   Diagnosis Date    Cardiomyopathy, dilated, nonischemic (HCC)     Colon polyps     Hypercholesterolemia     [facility]     [address], watch and wait strategy    [facility] spotted fever)     T2DM (type 2 diabetes mellitus) ([facility] 8/21/2013        Current Outpatient Medications   Medication Sig Dispense Refill    glucose blood VI test strips (FREESTYLE LITE STRIPS) strip USE TO TEST ONCE DAILY 100 Strip 5    simvastatin (ZOCOR) 20 mg tablet TAKE 1 TABLET NIGHTLY 90 Tab 2    carvedilol (COREG) 3.125 mg tablet TAKE 1 TABLET TWICE A DAY WITH MEALS 180 Tab 3    trandolapril (MAVIK) 2 mg tablet TAKE 1 TABLET NIGHTLY FOR LEFT VENTRICULAR DYSFUNCTION FOLLOWING MI 90 Tab 3    levothyroxine (SYNTHROID) 50 mcg tablet TAKE 1 TABLET DAILY BEFORE BREAKFAST 90 Tab 3    FREESTYLE LANCETS 28 gauge misc USE TO TEST DAILY 100 Lancet 4    aspirin delayed-release 81 mg tablet Take  by mouth daily.  Cholecalciferol, Vitamin D3, (VITAMIN D3) 1,000 unit cap Take  by mouth daily.  fish oil-dha-epa (FISH OIL) 1,200-144-216 mg Cap Take  by mouth.  VITAMIN B COMPLEX (B COMPLEX PO) Take  by mouth.  PV W-O MIKO/FERROUS FUMARATE/FA (M-VIT PO) Take  by mouth. Allergies   Allergen Reactions    Pcn [Penicillins] Rash and Swelling    Robitussin [Guaifenesin] Hives      Retired. Review of Systems  Constitutional: Negative for fever, chills, malaise/fatigue and diaphoresis.    Respiratory: Negative for cough, hemoptysis, sputum production, shortness of breath and wheezing. Cardiovascular: Negative for chest pain, palpitations, orthopnea, claudication, leg swelling and PND. Gastrointestinal: Negative for heartburn, nausea, vomiting, blood in stool and melena. Genitourinary: Negative for dysuria and flank pain. Musculoskeletal: Negative for joint pain and back pain. Skin: Negative for rash. Neurological: Negative for focal weakness, seizures, loss of consciousness, weakness and headaches. Endo/Heme/Allergies: Does not bruise/bleed easily. Psychiatric/Behavioral: Negative for memory loss. The patient does not have insomnia. Physical Exam    Visit Vitals  /86 (BP 1 Location: Right arm, BP Patient Position: Sitting)   Pulse 66   Resp 18   Ht 5' 6\" (1.676 m)   Wt 185 lb 12.8 oz (84.3 kg)   SpO2 96%   BMI 29.99 kg/m²     Wt Readings from Last 3 Encounters:   11/26/18 185 lb 12.8 oz (84.3 kg)   08/17/18 182 lb (82.6 kg)   05/16/18 185 lb (83.9 kg)      General - well developed well nourished  Neck - JVP normal, thyroid nl  Cardiac - normal S1, S2, no murmurs, rubs or gallops. No clicks  Vascular - carotids without bruits, radials, femorals and pedal pulses equal bilateral  Lungs - clear to auscultation bilaterals, no rales, wheezing or rhonchi  Abd - soft nontender, no HSM, no abd bruits  Extremities - no edema  Skin - no rash  Neuro - nonfocal  Psych - normal mood and affect    Cardiographics  EKG 11/2014 - Sinus rhythm with PVCs, mild IVCD  Echo 1/5/15 - EF 30-35%.  inferior and inferolateral akinesis, mild MR  EKG 4/16/18- SR, 1st degree AV block , LAHB, IVCD    Written by Shyann Trevino, as dictated by Wai Montoya MD.  Wai Montoya MD

## 2018-11-26 NOTE — PROGRESS NOTES
Room # 4    6 month follow up  No cardiac complaints    Visit Vitals  /86 (BP 1 Location: Right arm, BP Patient Position: Sitting)   Pulse 66   Resp 18   Ht 5' 6\" (1.676 m)   Wt 185 lb 12.8 oz (84.3 kg)   SpO2 96%   BMI 29.99 kg/m²

## 2018-12-19 ENCOUNTER — OFFICE VISIT (OUTPATIENT)
Dept: FAMILY MEDICINE CLINIC | Age: 78
End: 2018-12-19

## 2018-12-19 VITALS
WEIGHT: 184.8 LBS | TEMPERATURE: 97.5 F | OXYGEN SATURATION: 94 % | SYSTOLIC BLOOD PRESSURE: 118 MMHG | BODY MASS INDEX: 29.7 KG/M2 | HEART RATE: 73 BPM | RESPIRATION RATE: 20 BRPM | DIASTOLIC BLOOD PRESSURE: 67 MMHG | HEIGHT: 66 IN

## 2018-12-19 DIAGNOSIS — Z00.00 MEDICARE ANNUAL WELLNESS VISIT, SUBSEQUENT: Primary | ICD-10-CM

## 2018-12-19 NOTE — PROGRESS NOTES
[facility]  [facility], [address] [place] [address]             Date of visit: 12/19/2018       This is a Subsequent Medicare Annual Wellness Visit (AWV), (Performed more than 12 months after effective date of Medicare Part B enrollment and 12 months after last wellness visit)    I have reviewed the patient's medical history in detail and updated the computerized patient record. Claire Kc is a 69 y.o. male   History obtained from: the patient.     Concerns today   (Patient understands that medical problems addressed today may incur additional notes andcost as this is a preventive visit)      History     Patient Active Problem List   Diagnosis Code    Colon polyps K63.5    Hypothyroidism E03.9    Asymptomatic PVCs I49.3    [facility] spotted fever) A77.0    Ischemic cardiomyopathy I25.5    Mild 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 edema associated with diabetes mellitus due to underlying condition ([facility] K61.2857    Prostate cancer ([facility] C61    Controlled maturity onset diabetes mellitus in young (ROMINA) type 2 with complication ([facility] A19.8    Dyslipidemia E78.5    Chronic systolic congestive heart failure (HCC) I50.22     Past Medical History:   Diagnosis Date    Cardiomyopathy, dilated, nonischemic (HCC)     Colon polyps     Hypercholesterolemia     Prostate cancer ([facility]     Cielo 6, watch and wait strategy    [facility] spotted fever)     T2DM (type 2 diabetes mellitus) ([facility] 8/21/2013      Past Surgical History:   Procedure Laterality Date    CARDIAC SURG PROCEDURE UNLIST      HX HERNIA REPAIR      left inguinal x 2     Allergies   Allergen Reactions    Pcn [Penicillins] Rash and Swelling    Robitussin [Guaifenesin] Hives     Current Outpatient Medications   Medication Sig Dispense Refill    glucose blood VI test strips (FREESTYLE LITE STRIPS) strip USE TO TEST ONCE DAILY 100 Strip 5    simvastatin (ZOCOR) 20 mg tablet TAKE 1 TABLET NIGHTLY 90 Tab 2    carvedilol (COREG) 3.125 mg tablet TAKE 1 TABLET TWICE A DAY WITH MEALS 180 Tab 3    trandolapril (MAVIK) 2 mg tablet TAKE 1 TABLET NIGHTLY FOR LEFT VENTRICULAR DYSFUNCTION FOLLOWING MI 90 Tab 3    levothyroxine (SYNTHROID) 50 mcg tablet TAKE 1 TABLET DAILY BEFORE BREAKFAST 90 Tab 3    FREESTYLE LANCETS 28 gauge misc USE TO TEST DAILY 100 Lancet 4    aspirin delayed-release 81 mg tablet Take  by mouth daily.  Cholecalciferol, Vitamin D3, (VITAMIN D3) 1,000 unit cap Take  by mouth daily.  fish oil-dha-epa (FISH OIL) 1,200-144-216 mg Cap Take  by mouth.  VITAMIN B COMPLEX (B COMPLEX PO) Take  by mouth.  PV W-O MIKO/FERROUS FUMARATE/FA (M-VIT PO) Take  by mouth. Family History   Problem Relation Age of Onset    Cancer Father         GI    Hypertension Mother     Diabetes Mother     Cancer Sister         breast     Social History     Tobacco Use    Smoking status: Never Smoker    Smokeless tobacco: Never Used   Substance Use Topics    Alcohol use: No       Specialists/Care Team   Maria Del Rosario Torres has established care with the following healthcare providers:  Patient Care Team:  Morales Gavin MD as PCP - Genet Ingram MD (Urology)  Rc Ahmadi MD (Cardiology)  King Cl MD (Ophthalmology)      Health Risk Assessment     Demographics   male  69 y.o. General Health Questions   -During the past 4 weeks:   -how would you rate your health in general? Good   -how often have you been bothered by feeling dizzy when standing up? occasionally   -how much have you been bothered by bodily pain? not   -Have you noticed any hearing difficulties?  yes   -has your physical and emotional health limited your social activities with family or friends? no    Emotional Health Questions   -Do you have a history of depression, anxiety, or emotional problems? no  -Over the past 2 weeks, have you felt down, depressed or hopeless? no  -Over the past 2 weeks, have you felt little interest or pleasure in doing things? no    Health Habits   Please describe your diet habits: Self grade A  Do you get 5 servings of fruits or vegetables daily? no  Do you exercise regularly? no    Alcohol Risk Factor Screening: On any occasion during the past 3 months, have you had more than 4 drinks containing alcohol? No   Do you average more than 14 drinks per week? No     Activities of Daily Living and Functional Status   -Do you need help with eating, walking, dressing, bathing, toileting, the phone, transportation, shopping, preparing meals, housework, laundry, medications or managing money? no  -In the past four weeks, was someone available to help you if you needed and wanted help with anything? yes  -Are you confident are you that you can control and manage most of your health problems? yes  -Have you been given information to help you keep track of your medications? yes  -How often do you have trouble taking your medications as prescribed? never    Fall Risk and Home Safety   Have you fallen 2 or more times in the past year? no  Does your home have rugs in the hallway, lack grab bars in the bathroom, lack handrails on the stairs or have poor lighting? no  Do you have smoke detectors and check them regularly? yes  Do you have difficulties driving a car? no  Do you always fasten your seat belt when you are in a car? yes    Review of Systems (if indicated for problems addressed today)   Review of Systems   Constitutional: Negative. Negative for chills, fever, malaise/fatigue and weight loss. HENT: Negative. Negative for hearing loss. Eyes: Negative. Negative for blurred vision and double vision. Respiratory: Negative. Negative for cough, hemoptysis, sputum production and shortness of breath. Cardiovascular: Positive for palpitations. Negative for chest pain and orthopnea. Gastrointestinal: Negative.   Negative for abdominal pain, blood in stool, heartburn, nausea and vomiting. Genitourinary: Negative. Negative for dysuria, frequency and urgency. Musculoskeletal: Negative. Negative for back pain, myalgias and neck pain. Skin: Negative. Negative for rash. Neurological: Negative. Negative for dizziness, tingling, tremors, weakness and headaches. Endo/Heme/Allergies: Negative. Psychiatric/Behavioral: Negative. Negative for depression. Physical Examination     Wt Readings from Last 3 Encounters:   12/19/18 184 lb 12.8 oz (83.8 kg)   11/26/18 185 lb 12.8 oz (84.3 kg)   08/17/18 182 lb (82.6 kg)     Temp Readings from Last 3 Encounters:   12/19/18 97.5 °F (36.4 °C) (Oral)   09/17/18 98.7 °F (37.1 °C) (Oral)   08/17/18 98.4 °F (36.9 °C) (Oral)     BP Readings from Last 3 Encounters:   12/19/18 118/67   11/26/18 138/86   08/17/18 129/85     Pulse Readings from Last 3 Encounters:   12/19/18 73   11/26/18 66   08/17/18 70       Body mass index is 29.83 kg/m². No exam data present  Was the patient's timed Up & Go test unsteady or longer than 10 seconds? no    Evaluation of Cognitive Function   Mood/affect:  happy  Orientation: Person, Place, Time and Situation  Appearance: age appropriate and casually dressed  Family member/caregiver input: wife    Additional exam if indicated for problems addressed today:  Physical Exam   Constitutional: He is oriented to person, place, and time. He appears well-developed and well-nourished. No distress. HENT:   Head: Normocephalic and atraumatic. Mouth/Throat: Oropharynx is clear and moist.   Eyes: Conjunctivae are normal. Right eye exhibits no discharge. Left eye exhibits no discharge. No scleral icterus. Neck: No thyromegaly present. No carotid bruit. Cardiovascular: Normal rate and normal heart sounds. Exam reveals no gallop and no friction rub. No murmur heard. Few extrasystoles. Pulmonary/Chest: Effort normal and breath sounds normal. No respiratory distress.  He has no wheezes. He has no rales. He exhibits no tenderness. Abdominal: Soft. Bowel sounds are normal. He exhibits no distension and no mass. There is no tenderness. There is no guarding. Musculoskeletal: He exhibits no edema or tenderness. Lymphadenopathy:     He has no cervical adenopathy. Neurological: He is alert and oriented to person, place, and time. No cranial nerve deficit. Skin: Skin is warm and dry. No rash noted. He is not diaphoretic. No erythema. No pallor. Psychiatric: He has a normal mood and affect. His behavior is normal.         Advice/Referrals/Counseling (as indicated)     Discussed ACP need. Preventive Services     (Preventive care checklist to be included in patient instructions)  Discussed today Done Previously Not Needed     x  Pneumococcal vaccines    x  Flu vaccine     x Hepatitis B vaccine (if at risk)   x   Shingles vaccine    x  TDAP vaccine     x Digital rectal exam     x PSA     x Colorectal cancer screening     x Low-dose CT for lung cancer screening     x Bone density test   x x  Glaucoma screening    x  Cholesterol test    x  Diabetes screening test     x  Diabetes self-management class    x  Nutritionist referral for diabetes or renal disease     Discussion of Advance Directive   Discussed with Collin Foremanjorge his ability to prepare and advance directive in the case that an injury or illness causes him to be unable to make health care decisions. Assessment/Plan   Z00.00    ICD-10-CM ICD-9-CM    1.  Medicare annual wellness visit, subsequent Z00.00 V70.0            Patient Care Team:  Samantha Terry MD as PCP - General  Yvonne Harvey MD (Urology)  Jesús Mendoza MD (Cardiology)  Beto Diggs MD (Ophthalmology)    Follow-up Disposition: Not on File    Future Appointments   Date Time Provider Sarina Richards   5/31/2019  3:40 PM MD Brent Valera MD

## 2018-12-19 NOTE — PROGRESS NOTES
1. Have you been to the ER, urgent care clinic since your last visit? Hospitalized since your last visit? No    2. Have you seen or consulted any other health care providers outside of the [facility] since your last visit? Include any pap smears or colon screening.  No  Reviewed record in preparation for visit and have necessary documentation  Pt did not bring medication to office visit for review  Information was given to pt on Advanced Directives, Living Will  Information was given on Shingles Vaccine  opportunity was given for questions  Goals that were addressed and/or need to be completed during or after this appointment include     Health Maintenance Due   Topic Date Due    Shingrix Vaccine Age 50> (1 of 2) 08/21/1991    MEDICARE YEARLY EXAM  11/29/2018    GLAUCOMA SCREENING Q2Y  12/19/2018    EYE EXAM RETINAL OR DILATED  12/19/2018

## 2019-02-04 RX ORDER — LEVOTHYROXINE SODIUM 50 UG/1
TABLET ORAL
Qty: 90 TAB | Refills: 3 | Status: SHIPPED | OUTPATIENT
Start: 2019-02-04 | End: 2020-02-13

## 2019-02-05 ENCOUNTER — OFFICE VISIT (OUTPATIENT)
Dept: FAMILY MEDICINE CLINIC | Age: 79
End: 2019-02-05

## 2019-02-05 VITALS
RESPIRATION RATE: 20 BRPM | HEIGHT: 66 IN | SYSTOLIC BLOOD PRESSURE: 128 MMHG | BODY MASS INDEX: 29.73 KG/M2 | WEIGHT: 185 LBS | DIASTOLIC BLOOD PRESSURE: 86 MMHG | OXYGEN SATURATION: 93 % | TEMPERATURE: 97.7 F | HEART RATE: 77 BPM

## 2019-02-05 DIAGNOSIS — C61 PROSTATE CANCER (HCC): ICD-10-CM

## 2019-02-05 DIAGNOSIS — E11.3592 CONTROLLED TYPE 2 DIABETES MELLITUS WITH PROLIFERATIVE RETINOPATHY OF LEFT EYE, WITHOUT LONG-TERM CURRENT USE OF INSULIN, MACULAR EDEMA PRESENCE UNSPECIFIED, UNSPECIFIED PROLIFERATIVE RETINOPAT* (HCC): Primary | ICD-10-CM

## 2019-02-05 DIAGNOSIS — I49.3 ASYMPTOMATIC PVCS: ICD-10-CM

## 2019-02-05 DIAGNOSIS — I50.22 CHRONIC SYSTOLIC CONGESTIVE HEART FAILURE (HCC): ICD-10-CM

## 2019-02-05 DIAGNOSIS — I25.5 ISCHEMIC CARDIOMYOPATHY: ICD-10-CM

## 2019-02-05 DIAGNOSIS — E78.5 DYSLIPIDEMIA: ICD-10-CM

## 2019-02-05 DIAGNOSIS — E03.4 HYPOTHYROIDISM DUE TO ACQUIRED ATROPHY OF THYROID: ICD-10-CM

## 2019-02-05 NOTE — PROGRESS NOTES
1. Have you been to the ER, urgent care clinic since your last visit? Hospitalized since your last visit? No 
 
2. Have you seen or consulted any other health care providers outside of the [address] [place] [address] since your last visit? Include any pap smears or colon screening. No 
Reviewed record in preparation for visit and have necessary documentation Pt did not bring medication to office visit for review Goals that were addressed and/or need to be completed during or after this appointment include Health Maintenance Due Topic Date Due  Shingrix Vaccine Age 50> (1 of 2) 08/21/1991  GLAUCOMA SCREENING Q2Y  12/19/2018  
 EYE EXAM RETINAL OR DILATED  12/19/2018  HEMOGLOBIN A1C Q6M  02/17/2019

## 2019-02-05 NOTE — PROGRESS NOTES
[address] [place] [address], [address] [facility] 
[phone]  
 
 
Progress Note Patient: Doron Damico MRN: [id number]  SSN: [id number] YOB: 1940  Age: 69 y.o. Sex: male Chief Complaint Patient presents with  Follow-up  Diabetes Subjective:  
 
Encounter Diagnoses Name Primary? 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 Overall the patient feels well with good energy level. Key Antihyperglycemic Medications Patient is on no antihyperglycemic meds. Pertinent Labs:  
Lab Results Component Value Date/Time Hemoglobin A1c 5.8 (H) 08/17/2018 01:55 PM  
 Hemoglobin A1c 5.9 (H) 04/16/2018 03:27 PM  
 Hemoglobin A1c 5.9 (H) 12/15/2017 09:02 AM  
 
 Body mass index is 29.86 kg/m². Lab Results Component Value Date/Time LDL, calculated 62 08/17/2018 01:55 PM  
     
Lab Results Component Value Date/Time Sodium 140 08/17/2018 01:55 PM  
 Potassium 4.3 08/17/2018 01:55 PM  
 Chloride 102 08/17/2018 01:55 PM  
 CO2 22 08/17/2018 01:55 PM  
 Anion gap 10 10/06/2010 08:58 AM  
 Glucose 121 (H) 08/17/2018 01:55 PM  
 BUN 14 08/17/2018 01:55 PM  
 Creatinine 0.99 08/17/2018 01:55 PM  
 BUN/Creatinine ratio 14 08/17/2018 01:55 PM  
 GFR est AA 85 08/17/2018 01:55 PM  
 GFR est non-AA 74 08/17/2018 01:55 PM  
 Calcium 9.6 08/17/2018 01:55 PM  
 AST (SGOT) 20 08/17/2018 01:55 PM  
 Alk. phosphatase 56 08/17/2018 01:55 PM  
 Protein, total 6.8 08/17/2018 01:55 PM  
 Albumin 4.7 08/17/2018 01:55 PM  
 Globulin 2.8 10/06/2010 08:58 AM  
 A-G Ratio 2.2 08/17/2018 01:55 PM  
 ALT (SGPT) 19 08/17/2018 01:55 PM  
 
Lab Results Component Value Date/Time Microalbumin/Creat ratio (mg/g creat) 13 12/08/2009 11:22 AM  
 Microalb/Creat ratio (ug/mg creat.) 8.1 08/17/2018 01:55 PM  
 Microalbumin,urine random 1.72 12/08/2009 11:22 AM  
 
 Frequency of home glucose testing: no logs Blood Sugar range at home:  
 Last eye exam: In past 12 months. Last foot exam: This year. Polyuria, polyphagia or polydipsia: No 
 Retinopathy: No 
 Neuropathy SX: No  
 Low blood sugar symptoms: No 
 Dietary compliance: Good Medication compliance:Good On ASA: Yes Depression: No 
 CKD:no Wt Readings from Last 3 Encounters:  
02/05/19 185 lb (83.9 kg) 12/19/18 184 lb 12.8 oz (83.8 kg)  
11/26/18 185 lb 12.8 oz (84.3 kg) Social History Tobacco Use Smoking Status Never Smoker Smokeless Tobacco Never Used Body mass index is 29.86 kg/m². Diabetic Consultants: All the patient's questions regarding medications, diet and exercise were answered. Goal of A1C of less than 7.5% is our goal.  
Our overall goal is to reduce or eliminate the long term consequences of poorly controlled diabetes. Yes  Dyslipidemia: 
Cardiovascular risks for him are: LDL goal is under 80 
existing CAD 
hypertension 
hyperlipidemia. Key Antihyperlipidemia Meds   
    
  
 simvastatin (ZOCOR) 20 mg tablet  (Taking) TAKE 1 TABLET NIGHTLY Lab Results Component Value Date/Time Cholesterol, total 120 08/17/2018 01:55 PM  
 HDL Cholesterol 43 08/17/2018 01:55 PM  
 LDL, calculated 62 08/17/2018 01:55 PM  
 Triglyceride 76 08/17/2018 01:55 PM  
 CHOL/HDL Ratio 3.0 10/06/2010 08:58 AM  
 
Lab Results Component Value Date/Time ALT (SGPT) 19 08/17/2018 01:55 PM  
 AST (SGOT) 20 08/17/2018 01:55 PM  
 Alk. phosphatase 56 08/17/2018 01:55 PM  
 Bilirubin, total 0.6 08/17/2018 01:55 PM  
 
 Myalgias: No 
 Fatigue: No 
 Other side effects: no Wt Readings from Last 3 Encounters:  
02/05/19 185 lb (83.9 kg) 12/19/18 184 lb 12.8 oz (83.8 kg) 11/26/18 185 lb 12.8 oz (84.3 kg) The patient is aware of our goal to reduce or eliminate the long term problems (such as strokes and heart attacks) related to poorly controlled hyperlipidemia.  Hypothyroidism due to acquired atrophy of thyroid: 
Lab Results Component Value Date/Time TSH 2.890 04/16/2018 03:27 PM  
 T4, Free 1.21 08/17/2018 01:55 PM  
 
 Denies fatigue, nervousness,weight changes, heat orcold intolerance, bowel changes,skin changes, cardiovascular symptoms, hair loss, feeling excessive energy, tremor, palpitations and weight loss. Thyroid medication has been unchanged since last medication check and labs.  Chronic systolic congestive heart failure ([address]): Mild dyspnea on exertion. Otherwise compensated. No edema.  Prostate cancer [facility]): watchful waiting. He sees Dr. Ama Amaro on a regular basis.  Asymptomatic PVCs: He is unable to detect or feel the PVCs.  Ischemic cardiomyopathy: He is under the care of Dr. Shanelle Arroyo. Last ejection fraction was estimated 35-40%. This is from the echo done 5/24/2018. Current and past medical information: 
 
Current Medications after this visit[de-identified]    
Current Outpatient Medications Medication Sig  levothyroxine (SYNTHROID) 50 mcg tablet TAKE 1 TABLET DAILY BEFORE BREAKFAST  glucose blood VI test strips (FREESTYLE LITE STRIPS) strip USE TO TEST ONCE DAILY  simvastatin (ZOCOR) 20 mg tablet TAKE 1 TABLET NIGHTLY  carvedilol (COREG) 3.125 mg tablet TAKE 1 TABLET TWICE A DAY WITH MEALS  trandolapril (MAVIK) 2 mg tablet TAKE 1 TABLET NIGHTLY FOR LEFT VENTRICULAR DYSFUNCTION FOLLOWING MI  
 FREESTYLE LANCETS 28 gauge misc USE TO TEST DAILY  aspirin delayed-release 81 mg tablet Take  by mouth daily.  Cholecalciferol, Vitamin D3, (VITAMIN D3) 1,000 unit cap Take  by mouth daily.  fish oil-dha-epa (FISH OIL) 1,200-144-216 mg Cap Take  by mouth.  VITAMIN B COMPLEX (B COMPLEX PO) Take  by mouth.  PV W-O MIKO/FERROUS FUMARATE/FA (M-VIT PO) Take  by mouth.  varicella-zoster [facility]) injection Shingrix, one injection now and repeat in 4-6 months. To be administered at Pharmacy. Fax confirmation to me at [phone]. No current facility-administered medications for this visit. Patient Active Problem List  
 Diagnosis Date Noted  Chronic systolic congestive heart failure ([facility] [address] 05/16/2018 Priority: 1 - One  
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  
 Hypothyroidism 09/05/2012 Priority: 1 - One  Dyslipidemia 05/16/2018  Ischemic cardiomyopathy 01/06/2015  [facility] spotted fever)  Colon polyps 05/31/2010 Past Medical History:  
Diagnosis Date  Cardiomyopathy, dilated, nonischemic (HCC)  Colon polyps  Hypercholesterolemia  Prostate cancer ([email or web address] [address], watch and wait strategy  [facility] spotted fever)  T2DM (type 2 diabetes mellitus) ([email or web address] 8/21/2013 Allergies Allergen Reactions  Pcn [Penicillins] Rash and Swelling  Robitussin [Guaifenesin] Hives Past Surgical History:  
Procedure Laterality Date  CARDIAC SURG PROCEDURE UNLIST  HX HERNIA REPAIR    
 left inguinal x 2 Social History Socioeconomic History  Marital status:  Spouse name: Not on file  Number of children: Not on file  Years of education: Not on file  Highest education level: Not on file Tobacco Use  Smoking status: Never Smoker  Smokeless tobacco: Never Used Substance and Sexual Activity  Alcohol use: No  
 Drug use: No  
 
 
Review of Systems Constitutional: Negative. Negative for chills, fever, malaise/fatigue and weight loss. HENT: Negative. Negative for hearing loss. Eyes: Negative. Negative for blurred vision and double vision. Respiratory: Negative. Negative for cough, hemoptysis, sputum production and shortness of breath. Mild ALLEN. Cardiovascular: Negative. Negative for chest pain, palpitations and orthopnea. Gastrointestinal: Negative. Negative for abdominal pain, blood in stool, heartburn, nausea and vomiting. Genitourinary: Negative. Negative for dysuria, frequency and urgency. Musculoskeletal: Negative. Negative for back pain, myalgias and neck pain. Skin: Negative. Negative for rash. Neurological: Negative. Negative for dizziness, tingling, tremors, weakness and headaches. Endo/Heme/Allergies: Negative. Psychiatric/Behavioral: Negative. Negative for depression. Objective:  
 
Vitals:  
 02/05/19 6420 BP: 128/86 Pulse: 77 Resp: 20 Temp: 97.7 °F (36.5 °C) TempSrc: Oral  
SpO2: 93% Weight: 185 lb (83.9 kg) Height: 5' 6\" (1.676 m) Body mass index is 29.86 kg/m². Physical Exam  
Constitutional: He is oriented to person, place, and time and well-developed, well-nourished, and in no distress. HENT:  
Head: Normocephalic and atraumatic. Mouth/Throat: Oropharynx is clear and moist.  
Eyes: Right eye exhibits no discharge. Left eye exhibits no discharge. No scleral icterus. Neck: No thyromegaly present. No bruit. Cardiovascular: Normal rate, regular rhythm and normal heart sounds. No murmur heard. Pulmonary/Chest: No respiratory distress. He has no wheezes. Abdominal: Soft. He exhibits no distension. There is no tenderness. There is no guarding. Musculoskeletal: He exhibits no edema. Neurological: He is alert and oriented to person, place, and time. Skin: No rash noted. No erythema. Psychiatric: Mood and affect normal.  
Nursing note and vitals reviewed. Health Maintenance Due Topic Date Due  Shingrix Vaccine Age 50> (1 of 2) 08/21/1991  GLAUCOMA SCREENING Q2Y  12/19/2018  
 EYE EXAM RETINAL OR DILATED  12/19/2018  HEMOGLOBIN A1C Q6M  02/17/2019 Assessment and orders:  
 
Encounter Diagnoses ICD-10-CM ICD-9-CM 1. 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  
  362.02  
2. Dyslipidemia E78.5 272.4 3. Hypothyroidism due to acquired atrophy of thyroid E03.4 244.8  
  246.8 4. Chronic systolic congestive heart failure (HCC) I50.22 428.22  
  428.0 5. Prostate cancer ([facility] C61 185  
6. Asymptomatic PVCs I49.3 427.69  
7. Ischemic cardiomyopathy I25.5 414.8 Diagnoses and all orders for this visit: 1. 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 -     HEMOGLOBIN A1C WITH EAG 
-     MICROALBUMIN, UR, RAND W/ MICROALB/CREAT RATIO 
-     LIPID PANEL 
-     METABOLIC PANEL, COMPREHENSIVE 
-     T4, FREE 
-     TSH 3RD GENERATION 2. Dyslipidemia-retest 
-     LIPID PANEL 
-     METABOLIC PANEL, COMPREHENSIVE 3. Hypothyroidism due to acquired atrophy of thyroid-retest 
-     T4, FREE 
-     TSH 3RD GENERATION 4. Chronic systolic congestive heart failure (HCC)-compensated. He gets mild dyspnea on exertion but just slows up his activity. 5. Prostate cancer (HCC)-under surveillance 6. Asymptomatic PVCs-no PVCs on exam today. 7. Ischemic cardiomyopathy-quiescent onset. -     LIPID PANEL 
-     METABOLIC PANEL, COMPREHENSIVE Plan of care: Discussed diagnoses in detail with patient. Medication risks/benefits/side effects discussed with patient. All of the patient's questions were addressed. The patient understands and agrees with our plan of care. The patient knows to call back if they are unsure of or forget any changes we discussed today or if the symptoms change. The patient received an After-Visit Summary which contains VS, orders, medication list and allergy list. This can be used as a \"mini-medical record\" should they have to seek medical care while out of town. Patient Care Team: 
Smith Sarabia MD as PCP - Rc Riddle MD (Urology) Domenica Rodrigues MD (Cardiology) Josey Uriarte MD (Ophthalmology) Follow-up Disposition: 
Return in about 3 months (around 5/5/2019). Future Appointments Date Time Provider Sarina Romeroisti 5/14/2019  8:40 AM Smith Sarabia MD [facility] SCHED  
5/31/2019  3:40 PM Domenica Rodrigues MD [address] [place] [address] Signed By: Edison Valdez MD   
 February 5, 2019 This note was created using voice recognition software. Despite editing, there may be syntax errors.

## 2019-02-05 NOTE — PATIENT INSTRUCTIONS
Hypothyroidism: Care Instructions Your Care Instructions You have hypothyroidism, which means that your body is not making enough thyroid hormone. This hormone helps your body use energy. If your thyroid level is low, you may feel tired, be constipated, have an increase in your blood pressure, or have dry skin or memory problems. You may also get cold easily, even when it is warm. Women with low thyroid levels may have heavy menstrual periods. A blood test to find your thyroid-stimulating hormone (TSH) level is used to check for hypothyroidism. A high TSH level may mean that you have low thyroid. When your body is not making enough thyroid hormone, TSH levels rise in an effort to make the body produce more. The treatment for hypothyroidism is to take thyroid hormone pills. You should start to feel better in 1 to 2 weeks. But it can take several months to see changes in the TSH level. You will need regular visits with your doctor to make sure you have the right dose of medicine. Most people need treatment for the rest of their lives. You will need to see your doctor regularly to have blood tests and to make sure you are doing well. Follow-up care is a key part of your treatment and safety. Be sure to make and go to all appointments, and call your doctor if you are having problems. It's also a good idea to know your test results and keep a list of the medicines you take. How can you care for yourself at home? · Take your thyroid hormone medicine exactly as prescribed. Call your doctor if you think you are having a problem with your medicine. Most people do not have side effects if they take the right amount of medicine regularly. ? Take the medicine 30 minutes before breakfast, and do not take it with calcium, vitamins, or iron. ? Do not take extra doses of your thyroid medicine. It will not help you get better any faster, and it may cause side effects. ? If you forget to take a dose, do NOT take a double dose of medicine. Take your usual dose the next day. · Tell your doctor about all prescription, herbal, or over-the-counter products you take. · Take care of yourself. Eat a healthy diet, get enough sleep, and get regular exercise. When should you call for help? Call 911 anytime you think you may need emergency care. For example, call if: 
  · You passed out (lost consciousness).  
  · You have severe trouble breathing.  
  · You have a very slow heartbeat (less than 60 beats a minute).  
  · You have a low body temperature (95°F or below).  
 Call your doctor now or seek immediate medical care if: 
  · You feel tired, sluggish, or weak.  
  · You have trouble remembering things or concentrating.  
  · You do not begin to feel better 2 weeks after starting your medicine.  
 Watch closely for changes in your health, and be sure to contact your doctor if you have any problems. Where can you learn more? Go to http://mabel-maliha.info/. Enter O090 in the search box to learn more about \"Hypothyroidism: Care Instructions. \" Current as of: March 14, 2018 Content Version: 11.9 © 3256-6573 [employer]. Care instructions adapted under license by [employer] (which disclaims liability or warranty for this information). If you have questions about a medical condition or this instruction, always ask your healthcare professional. [address] any warranty or liability for your use of this information.

## 2019-02-06 LAB
ALBUMIN SERPL-MCNC: 4.6 G/DL (ref 3.5–4.8)
ALBUMIN/CREAT UR: 8.7 MG/G CREAT (ref 0–30)
ALBUMIN/GLOB SERPL: 2.1 {RATIO} (ref 1.2–2.2)
ALP SERPL-CCNC: 49 IU/L (ref 39–117)
ALT SERPL-CCNC: 21 IU/L (ref 0–44)
AST SERPL-CCNC: 16 IU/L (ref 0–40)
BILIRUB SERPL-MCNC: 0.8 MG/DL (ref 0–1.2)
BUN SERPL-MCNC: 17 MG/DL (ref 8–27)
BUN/CREAT SERPL: 17 (ref 10–24)
CALCIUM SERPL-MCNC: 9.5 MG/DL (ref 8.6–10.2)
CHLORIDE SERPL-SCNC: 103 MMOL/L (ref 96–106)
CHOLEST SERPL-MCNC: 129 MG/DL (ref 100–199)
CO2 SERPL-SCNC: 24 MMOL/L (ref 20–29)
CREAT SERPL-MCNC: 1.01 MG/DL (ref 0.76–1.27)
CREAT UR-MCNC: 109.8 MG/DL
EST. AVERAGE GLUCOSE BLD GHB EST-MCNC: 128 MG/DL
GLOBULIN SER CALC-MCNC: 2.2 G/DL (ref 1.5–4.5)
GLUCOSE SERPL-MCNC: 120 MG/DL (ref 65–99)
HBA1C MFR BLD: 6.1 % (ref 4.8–5.6)
HDLC SERPL-MCNC: 44 MG/DL
LDLC SERPL CALC-MCNC: 64 MG/DL (ref 0–99)
MICROALBUMIN UR-MCNC: 9.5 UG/ML
POTASSIUM SERPL-SCNC: 4.2 MMOL/L (ref 3.5–5.2)
PROT SERPL-MCNC: 6.8 G/DL (ref 6–8.5)
SODIUM SERPL-SCNC: 141 MMOL/L (ref 134–144)
T4 FREE SERPL-MCNC: 1.32 NG/DL (ref 0.82–1.77)
TRIGL SERPL-MCNC: 104 MG/DL (ref 0–149)
TSH SERPL DL<=0.005 MIU/L-ACNC: 4.48 UIU/ML (ref 0.45–4.5)
VLDLC SERPL CALC-MCNC: 21 MG/DL (ref 5–40)

## 2019-02-08 RX ORDER — CARVEDILOL 3.12 MG/1
TABLET ORAL
Qty: 180 TAB | Refills: 3 | Status: SHIPPED | OUTPATIENT
Start: 2019-02-08 | End: 2020-02-13

## 2019-02-18 ENCOUNTER — TELEPHONE (OUTPATIENT)
Dept: FAMILY MEDICINE CLINIC | Age: 79
End: 2019-02-18

## 2019-02-18 NOTE — TELEPHONE ENCOUNTER
Patient's wife, Rodri Alvarez, on HIPAA called. She said the patient is having a lot of joint pain. She wants him to be seen today. He is scheduled for tomorrow with Dr. Chikis Boyle. Please call the patient back at [phone].

## 2019-02-18 NOTE — TELEPHONE ENCOUNTER
Returned phone call to patient's wife, no answer. Left message for her to call [facility].

## 2019-02-19 ENCOUNTER — APPOINTMENT (OUTPATIENT)
Dept: GENERAL RADIOLOGY | Age: 79
End: 2019-02-19
Attending: NURSE PRACTITIONER
Payer: MEDICARE

## 2019-02-19 ENCOUNTER — HOSPITAL ENCOUNTER (EMERGENCY)
Age: 79
Discharge: HOME | End: 2019-02-19
Attending: EMERGENCY MEDICINE
Payer: MEDICARE

## 2019-02-19 VITALS
DIASTOLIC BLOOD PRESSURE: 64 MMHG | OXYGEN SATURATION: 96 % | SYSTOLIC BLOOD PRESSURE: 107 MMHG | TEMPERATURE: 99.7 F | HEIGHT: 67 IN | RESPIRATION RATE: 16 BRPM | HEART RATE: 88 BPM | BODY MASS INDEX: 29.03 KG/M2 | WEIGHT: 185 LBS

## 2019-02-19 DIAGNOSIS — J11.1 INFLUENZA-LIKE ILLNESS: Primary | ICD-10-CM

## 2019-02-19 LAB
ANION GAP SERPL CALC-SCNC: 10 MMOL/L (ref 5–15)
APPEARANCE UR: CLEAR
BACTERIA URNS QL MICRO: ABNORMAL /HPF
BASOPHILS # BLD: 0 K/UL (ref 0–0.1)
BASOPHILS NFR BLD: 0 % (ref 0–1)
BILIRUB UR QL CFM: NEGATIVE
BUN SERPL-MCNC: 18 MG/DL (ref 6–20)
BUN/CREAT SERPL: 18 (ref 12–20)
CALCIUM SERPL-MCNC: 9.7 MG/DL (ref 8.5–10.1)
CHLORIDE SERPL-SCNC: 98 MMOL/L (ref 97–108)
CO2 SERPL-SCNC: 25 MMOL/L (ref 21–32)
COLOR UR: ABNORMAL
CREAT SERPL-MCNC: 1.02 MG/DL (ref 0.7–1.3)
DIFFERENTIAL METHOD BLD: ABNORMAL
EOSINOPHIL # BLD: 0 K/UL (ref 0–0.4)
EOSINOPHIL NFR BLD: 0 % (ref 0–7)
EPITH CASTS URNS QL MICRO: ABNORMAL /LPF
ERYTHROCYTE [DISTWIDTH] IN BLOOD BY AUTOMATED COUNT: 12.3 % (ref 11.5–14.5)
FLUAV AG NPH QL IA: NEGATIVE
FLUBV AG NOSE QL IA: NEGATIVE
GLUCOSE SERPL-MCNC: 139 MG/DL (ref 65–100)
GLUCOSE UR STRIP.AUTO-MCNC: NEGATIVE MG/DL
HCT VFR BLD AUTO: 36.9 % (ref 36.6–50.3)
HGB BLD-MCNC: 12.5 G/DL (ref 12.1–17)
HGB UR QL STRIP: NEGATIVE
IMM GRANULOCYTES # BLD AUTO: 0.1 K/UL (ref 0–0.04)
IMM GRANULOCYTES NFR BLD AUTO: 1 % (ref 0–0.5)
KETONES UR QL STRIP.AUTO: 40 MG/DL
LEUKOCYTE ESTERASE UR QL STRIP.AUTO: ABNORMAL
LYMPHOCYTES # BLD: 0.8 K/UL (ref 0.8–3.5)
LYMPHOCYTES NFR BLD: 6 % (ref 12–49)
MCH RBC QN AUTO: 33 PG (ref 26–34)
MCHC RBC AUTO-ENTMCNC: 33.9 G/DL (ref 30–36.5)
MCV RBC AUTO: 97.4 FL (ref 80–99)
MONOCYTES # BLD: 1.3 K/UL (ref 0–1)
MONOCYTES NFR BLD: 8 % (ref 5–13)
NEUTS SEG # BLD: 13 K/UL (ref 1.8–8)
NEUTS SEG NFR BLD: 85 % (ref 32–75)
NITRITE UR QL STRIP.AUTO: NEGATIVE
NRBC # BLD: 0 K/UL (ref 0–0.01)
NRBC BLD-RTO: 0 PER 100 WBC
PH UR STRIP: 5.5 [PH] (ref 5–8)
PLATELET # BLD AUTO: 283 K/UL (ref 150–400)
PMV BLD AUTO: 9 FL (ref 8.9–12.9)
POTASSIUM SERPL-SCNC: 4.2 MMOL/L (ref 3.5–5.1)
PROT UR STRIP-MCNC: 30 MG/DL
RBC # BLD AUTO: 3.79 M/UL (ref 4.1–5.7)
RBC #/AREA URNS HPF: ABNORMAL /HPF (ref 0–5)
SODIUM SERPL-SCNC: 133 MMOL/L (ref 136–145)
SP GR UR REFRACTOMETRY: 1.02 (ref 1–1.03)
UR CULT HOLD, URHOLD: NORMAL
UROBILINOGEN UR QL STRIP.AUTO: 1 EU/DL (ref 0.2–1)
WBC # BLD AUTO: 15.2 K/UL (ref 4.1–11.1)
WBC URNS QL MICRO: ABNORMAL /HPF (ref 0–4)

## 2019-02-19 PROCEDURE — 87804 INFLUENZA ASSAY W/OPTIC: CPT

## 2019-02-19 PROCEDURE — 36415 COLL VENOUS BLD VENIPUNCTURE: CPT

## 2019-02-19 PROCEDURE — 85025 COMPLETE CBC W/AUTO DIFF WBC: CPT

## 2019-02-19 PROCEDURE — 81001 URINALYSIS AUTO W/SCOPE: CPT

## 2019-02-19 PROCEDURE — 80048 BASIC METABOLIC PNL TOTAL CA: CPT

## 2019-02-19 PROCEDURE — 71046 X-RAY EXAM CHEST 2 VIEWS: CPT

## 2019-02-19 PROCEDURE — 99283 EMERGENCY DEPT VISIT LOW MDM: CPT

## 2019-02-19 NOTE — TELEPHONE ENCOUNTER
Pt is returning call. [phone]. Stating Pt is worse than yesterday.  Wants to know should he go to the hospital?

## 2019-02-19 NOTE — TELEPHONE ENCOUNTER
Spoke with patients wife. She says that her  is in terrible pain and cannot dress himself and wanted to know if she should take him to the hospital. Advised her that if his pain is that bad then she should have him go to the ER. Advised her to call 911 for him to be transported by EMS.

## 2019-02-19 NOTE — ED PROVIDER NOTES
69 y.o. male with past medical history significant for dyslipidemia, hypothyroidism due to acquired atrophy, chronic systolic CHF, prostate cancer, and ischemic cardiomiopathy who presents from home via a private vehicle with chief complaint of generalized malaise. Pt reports four day history of generalized malaise, generalized weakness, and generalized arthralgias. Pt reports these symptoms have worsened since onset. Pt reports having fevers at home over the last two days with the highest temperature reach 99.1 F. Pt reports he has been taking Tylenol every four hours. Per family member, pt sick contacts were likely when he was seen at his PCP's office on 2/13/19 for a regular wellness check-up. Pt states he got his flu shot this season. Pt denies any history of COPD. Pt denies any recent nausea, vomiting, diarrhea, urinary symptoms, dyspnea, or other symptoms. There are no other acute medical concerns at this time. Social hx: Tobacco use: none, Alcohol use: none PCP: Stan Farooq MD 
 
Note written by David Ridley, as dictated by Gerber Ogden NP 11:40 AM 
 
 
 
The history is provided by the patient and a relative. No  was used. Past Medical History:  
Diagnosis Date  Cardiomyopathy, dilated, nonischemic (HCC)  Colon polyps  Hypercholesterolemia  Prostate cancer ([facility] [address], watch and wait strategy  [facility] spotted fever)  T2DM (type 2 diabetes mellitus) ([facility] 8/21/2013 Past Surgical History:  
Procedure Laterality Date  CARDIAC SURG PROCEDURE UNLIST  HX HERNIA REPAIR    
 left inguinal x 2 Family History:  
Problem Relation Age of Onset  Cancer Father GI  
 Hypertension Mother  Diabetes Mother  Cancer Sister   
     breast  
 
 
Social History Socioeconomic History  Marital status:  Spouse name: Not on file  Number of children: Not on file  Years of education: Not on file  Highest education level: Not on file Social Needs  Financial resource strain: Not on file  Food insecurity - worry: Not on file  Food insecurity - inability: Not on file  Transportation needs - medical: Not on file  Transportation needs - non-medical: Not on file Occupational History  Not on file Tobacco Use  Smoking status: Never Smoker  Smokeless tobacco: Never Used Substance and Sexual Activity  Alcohol use: No  
 Drug use: No  
 Sexual activity: Not on file Other Topics Concern  Not on file Social History Narrative  Not on file ALLERGIES: Pcn [penicillins] and Robitussin [guaifenesin] Review of Systems Constitutional: Positive for fatigue and fever. Negative for activity change, appetite change and chills. HENT: Negative for ear discharge, ear pain, sinus pressure, sinus pain, sore throat and trouble swallowing. Eyes: Negative for photophobia, pain, redness, itching and visual disturbance. Respiratory: Negative for cough, chest tightness and shortness of breath. Cardiovascular: Negative for chest pain, palpitations and leg swelling. Gastrointestinal: Negative for abdominal distention, abdominal pain, constipation, diarrhea, nausea and vomiting. Endocrine: Negative. Genitourinary: Negative for difficulty urinating, dysuria, flank pain, frequency, hematuria and urgency. Musculoskeletal: Positive for arthralgias and myalgias. Negative for back pain, neck pain and neck stiffness. Skin: Negative for color change, pallor, rash and wound. Allergic/Immunologic: Negative. Neurological: Positive for weakness. Negative for dizziness, syncope, numbness and headaches. Hematological: Does not bruise/bleed easily. Psychiatric/Behavioral: Negative for behavioral problems. The patient is not nervous/anxious. All other systems reviewed and are negative. Vitals:  
 02/19/19 1030 BP: 107/64 Pulse: (!) 106 Resp: 16 Temp: 99.7 °F (37.6 °C) SpO2: 95% Weight: 83.9 kg (185 lb) Height: 5' 7\" (1.702 m) Physical Exam  
Constitutional: He is oriented to person, place, and time. He appears well-developed and well-nourished. No distress. HENT:  
Head: Normocephalic and atraumatic. Right Ear: External ear normal.  
Left Ear: External ear normal.  
Nose: Nose normal.  
Eyes: Conjunctivae and EOM are normal. Pupils are equal, round, and reactive to light. Right eye exhibits no discharge. Left eye exhibits no discharge. Neck: Normal range of motion. Neck supple. No JVD present. No tracheal deviation present. Cardiovascular: Regular rhythm, normal heart sounds and intact distal pulses. Exam reveals no gallop. No murmur heard. Tachycardia 100's Pulmonary/Chest: Effort normal and breath sounds normal. No respiratory distress. He has no wheezes. He has no rales. He exhibits no tenderness. Abdominal: Soft. Bowel sounds are normal. He exhibits no distension. There is no tenderness. There is no rebound and no guarding. Genitourinary:  
Genitourinary Comments: Negative Musculoskeletal: Normal range of motion. He exhibits tenderness (general joint tenderness). He exhibits no edema. General malaise Neurological: He is alert and oriented to person, place, and time. Skin: Skin is warm and dry. No rash noted. No erythema. No pallor. Psychiatric: He has a normal mood and affect. His behavior is normal. Judgment and thought content normal.  
Nursing note and vitals reviewed. MDM Number of Diagnoses or Management Options Influenza-like illness: new and requires workup Diagnosis management comments: Plan: 
Discharge to home and follow up with PCP. Return to ED with worsening symptoms. 1:00 PM 
Dr. Steward Sink in to see patient and in agreement with plan of care.  Patient to be discharged to home and follow up with PCP. 
1:15 PM 
 Pt has been reexamined. Pt has no new complaints, changes or physical findings. Care plan outlined and precautions discussed. All available results were reviewed with pt. All medications were reviewed with pt. All of pt's questions and concerns were addressed. Pt agrees to F/U as instructed and agrees to return to ED upon further deterioration. Pt is ready to go home. Elisa John NP Procedures

## 2019-02-19 NOTE — DISCHARGE INSTRUCTIONS
Patient Education        Coping With an Acute Illness: Care Instructions  Your Care Instructions    Finding out that you have a sudden (acute) illness can be very hard. Getting sick can be scary. It also can be very expensive and can disrupt your life. The most important thing is to deal with the illness and get healthy again. If work, school, or other activities have to be put on hold while you get treatment, that is okay. Do what you have to do to get better, and then you can focus on the rest of your life. Some acute illnesses can become chronic, which means that they last for a long time. Although this may not happen with you, it is best to be prepared for this and to know how to handle it. Talk to your doctor to find out as much as you can about the illness and how best to treat it. Follow-up care is a key part of your treatment and safety. Be sure to make and go to all appointments, and call your doctor if you are having problems. It's also a good idea to know your test results and keep a list of the medicines you take. How can you care for yourself at home? Treating the illness  · Take your medicines exactly as prescribed. Call your doctor if you think you are having a problem with your medicine. You will get more details on the specific medicines your doctor prescribes. · Take pain medicines exactly as directed. ? If the doctor gave you a prescription medicine for pain, take it as prescribed. ? If you are not taking a prescription pain medicine, ask your doctor if you can use an over-the-counter medicine. · Tell your doctor about any over-the-counter medicines you take. These medicines can cause problems when taken with other medicines. · Keep in touch with your doctor. The more informed you keep your doctor, the better he or she will be able to care for you. · Get plenty of rest. Talk with your doctor if you have trouble sleeping because of pain. · Eat a balanced diet.  Talk with your doctor about what type of diet may be best.  · Do not smoke. Smoking or being around smoke can make the condition worse. If you need help quitting, talk to your doctor about stop-smoking programs and medicines. These can increase your chances of quitting for good. Keeping your life in order  Here are some tips on how to keep your life on track while you get better. · Talk with your insurance provider to make sure treatments are covered. · Make sure your employer knows about the illness. Get work commitments taken care of or postponed. This will give you the time and energy you need to treat the illness. · Keep your employer informed about when you will be able to return. · Ask for help from friends and family to keep your home in order. Keep your surroundings clean and in good shape to help reduce stress. Get help so you can save your energy for treating the illness. Questions to ask  · Can the illness be cured? Can the illness become long-lasting (chronic)? · How will my lifestyle change once the illness is gone? · Does the illness run in families? · What types of treatment are available? Which treatment has the best success rate? · Are there any side effects? · What are the best and worst possible results of the treatment? When should you call for help? Watch closely for changes in your health, and be sure to contact your doctor if:    · You do not get better as expected. Where can you learn more? Go to http://mabel-maliha.info/. Enter [id number] in the search box to learn more about \"Coping With an Acute Illness: Care Instructions. \"  Current as of: June 28, 2018  Content Version: 11.9  © 4417-4532 [employer]. Care instructions adapted under license by [employer] (which disclaims liability or warranty for this information).  If you have questions about a medical condition or this instruction, always ask your healthcare professional. [address] any warranty or liability for your use of this information.

## 2019-02-19 NOTE — ED NOTES
SULLY Drake has reviewed discharge instructions with patient and daughter including prescription(s) if applicable. Patient has received and verbalizes understanding of all instructions and has no further questions at this time. Patient exits ED via wheelchair accompanied by RN and daughter. Patient in no acute distress.

## 2019-02-20 NOTE — TELEPHONE ENCOUNTER
Patient returned call. He reports that he is feeling much better today. His chest xray in the ER was normal and his flu test was negative. He was told that he has a virus and to take Advil/Tylenol. He says that his pain has subsided and he is able to get up and move about. He appreciated us calling to check on him and knows to call us if he needs anything.

## 2019-02-20 NOTE — TELEPHONE ENCOUNTER
Phone call to patient's wife to check and see how her  is feeling today, no answer. Message left on answering machine to her to call [facility].

## 2019-02-21 ENCOUNTER — PATIENT OUTREACH (OUTPATIENT)
Dept: FAMILY MEDICINE CLINIC | Age: 79
End: 2019-02-21

## 2019-02-25 ENCOUNTER — OFFICE VISIT (OUTPATIENT)
Dept: FAMILY MEDICINE CLINIC | Age: 79
End: 2019-02-25

## 2019-02-25 VITALS
SYSTOLIC BLOOD PRESSURE: 112 MMHG | HEART RATE: 92 BPM | WEIGHT: 174 LBS | BODY MASS INDEX: 27.31 KG/M2 | HEIGHT: 67 IN | TEMPERATURE: 98 F | DIASTOLIC BLOOD PRESSURE: 74 MMHG | OXYGEN SATURATION: 95 % | RESPIRATION RATE: 16 BRPM

## 2019-02-25 DIAGNOSIS — D72.828 OTHER ELEVATED WHITE BLOOD CELL (WBC) COUNT: ICD-10-CM

## 2019-02-25 DIAGNOSIS — M79.10 MYALGIA: ICD-10-CM

## 2019-02-25 DIAGNOSIS — R68.89 FLU-LIKE SYMPTOMS: Primary | ICD-10-CM

## 2019-02-25 DIAGNOSIS — E03.9 ACQUIRED HYPOTHYROIDISM: ICD-10-CM

## 2019-02-25 DIAGNOSIS — I50.22 CHRONIC SYSTOLIC CONGESTIVE HEART FAILURE (HCC): ICD-10-CM

## 2019-02-25 DIAGNOSIS — E11.9 TYPE 2 DIABETES MELLITUS WITHOUT COMPLICATION, WITHOUT LONG-TERM CURRENT USE OF INSULIN (HCC): ICD-10-CM

## 2019-02-25 DIAGNOSIS — C61 PROSTATE CANCER (HCC): ICD-10-CM

## 2019-02-25 DIAGNOSIS — M25.50 ARTHRALGIA, UNSPECIFIED JOINT: ICD-10-CM

## 2019-02-25 NOTE — PROGRESS NOTES
1. Have you been to the ER, urgent care clinic since your last visit? Hospitalized since your last visit? Yes When: 02/19/2019, [facility], joint pain    2. Have you seen or consulted any other health care providers outside of the [address] [place] [address] since your last visit? Include any pap smears or colon screening.  No  Reviewed record in preparation for visit and have necessary documentation  Pt did not bring medication to office visit for review    Goals that were addressed and/or need to be completed during or after this appointment include     Health Maintenance Due   Topic Date Due    Shingrix Vaccine Age 50> (1 of 2) 08/21/1991

## 2019-02-26 NOTE — PATIENT INSTRUCTIONS
Musculoskeletal Pain: Care Instructions  Your Care Instructions    Different problems with the bones, muscles, nerves, ligaments, and tendons in the body can cause pain. One or more areas of your body may ache or burn. Or they may feel tired, stiff, or sore. The medical term for this type of pain is musculoskeletal pain. It can have many different causes. Sometimes the pain is caused by an injury such as a strain or sprain. Or you might have pain from using one part of your body in the same way over and over again. This is called overuse. In some cases, the cause of the pain is another health problem such as arthritis or fibromyalgia. The doctor will examine you and ask you questions about your health to help find the cause of your pain. Blood tests or imaging tests like an X-ray may also be helpful. But sometimes doctors can't find a cause of the pain. Treatment depends on your symptoms and the cause of the pain, if known. The doctor has checked you carefully, but problems can develop later. If you notice any problems or new symptoms, get medical treatment right away. Follow-up care is a key part of your treatment and safety. Be sure to make and go to all appointments, and call your doctor if you are having problems. It's also a good idea to know your test results and keep a list of the medicines you take. How can you care for yourself at home? · Rest until you feel better. · Do not do anything that makes the pain worse. Return to exercise gradually if you feel better and your doctor says it's okay. · Be safe with medicines. Read and follow all instructions on the label. ? If the doctor gave you a prescription medicine for pain, take it as prescribed. ? If you are not taking a prescription pain medicine, ask your doctor if you can take an over-the-counter medicine. · Put ice or a cold pack on the area for 10 to 20 minutes at a time to ease pain.  Put a thin cloth between the ice and your skin.  When should you call for help? Call your doctor now or seek immediate medical care if:    · You have new pain, or your pain gets worse.     · You have new symptoms such as a fever, a rash, or chills.    Watch closely for changes in your health, and be sure to contact your doctor if:    · You do not get better as expected. Where can you learn more? Go to http://mabel-maliha.info/. Enter O385 in the search box to learn more about \"Musculoskeletal Pain: Care Instructions. \"  Current as of: Rosy 3, 2018  Content Version: 11.9  © 0288-1680 [employer]. Care instructions adapted under license by [facility] (which disclaims liability or warranty for this information). If you have questions about a medical condition or this instruction, always ask your healthcare professional. [address] any warranty or liability for your use of this information.

## 2019-02-26 NOTE — PROGRESS NOTES
Patient: Cornel Pearson MRN: [id number]  SSN: [id number]    YOB: 1940  Age: 69 y.o. Sex: male      Chief Complaint   Patient presents with    Joint Pain     for 8 days     Cornel Pearson is a 69 y.o. male new to me who presents with daughter for f/u of ED encounter for flu-like illness. Patient with hx of HLD, hypothyroidism, chronic systolic CHF, prostate cancer, and ischemic cardiomyopathy. Patient says he was in his normal state of health when he developed malaise, fever, acute myalgias, arthralgias, weakness, cough described as productive of clear sputum, and loss of appetite ~10 days ago. Went to ED after 3 days of symptoms. CXR unremarkable. Rapid flu negative in ED. Positive for elevated neutrophils and monocytes. There has been no nausea or vomiting . he has not had a sore throat, swollen glands or night sweats. Today he complains of frontal sinus pressure, dry cough, continued arthralgias, muscle weakness, poor appetite and weight loss. He says ibuprofen helping with symptoms which have improved over the past 3 days. He has held statin as provider in ED suggested this may be contributing to arthralgias and myalgias. Patient is drinking plenty of fluids. There is not a hx of asthma, allergic rhinitis or tobacco use. There have not been contacts with similar infections. Wt Readings from Last 3 Encounters:   02/25/19 174 lb (78.9 kg)   02/19/19 185 lb (83.9 kg)   02/05/19 185 lb (83.9 kg)     Body mass index is 27.25 kg/m².     BP Readings from Last 3 Encounters:   02/25/19 112/74   02/19/19 107/64   02/05/19 128/86         Medications:     Current Outpatient Medications   Medication Sig    carvedilol (COREG) 3.125 mg tablet TAKE 1 TABLET TWICE A DAY WITH MEALS    levothyroxine (SYNTHROID) 50 mcg tablet TAKE 1 TABLET DAILY BEFORE BREAKFAST    simvastatin (ZOCOR) 20 mg tablet TAKE 1 TABLET NIGHTLY    trandolapril (MAVIK) 2 mg tablet TAKE 1 TABLET NIGHTLY FOR LEFT VENTRICULAR DYSFUNCTION FOLLOWING MI    aspirin delayed-release 81 mg tablet Take  by mouth daily.  Cholecalciferol, Vitamin D3, (VITAMIN D3) 1,000 unit cap Take  by mouth daily.  fish oil-dha-epa (FISH OIL) 1,200-144-216 mg Cap Take  by mouth.  VITAMIN B COMPLEX (B COMPLEX PO) Take  by mouth.  PV W-O MIKO/FERROUS FUMARATE/FA (M-VIT PO) Take  by mouth.  varicella-zoster [facility]) injection Shingrix, one injection now and repeat in 4-6 months. To be administered at Pharmacy. Fax confirmation to me at [phone].  glucose blood VI test strips (FREESTYLE LITE STRIPS) strip USE TO TEST ONCE DAILY    FREESTYLE LANCETS 28 gauge misc USE TO TEST DAILY     No current facility-administered medications for this visit. Problem List:     Patient Active Problem List    Diagnosis Date Noted    Dyslipidemia 05/16/2018    Chronic systolic congestive heart failure ([address] 05/16/2018    Controlled maturity onset diabetes mellitus in young (ROMINA) type 2 with complication ([address] 69/86/1506    Mild 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 edema associated with diabetes mellitus due to underlying condition ([address] 12/15/2015    Prostate cancer ([address] 12/15/2015    Ischemic cardiomyopathy 01/06/2015    [facility] spotted fever)     Asymptomatic PVCs 08/21/2013    Hypothyroidism 09/05/2012    Colon polyps 05/31/2010       Medical History:     Past Medical History:   Diagnosis Date    Cardiomyopathy, dilated, nonischemic ([address]     Colon polyps     Hypercholesterolemia     Prostate cancer ([address]     [address], watch and wait strategy    [facility] spotted fever)     T2DM (type 2 diabetes mellitus) ([address] 8/21/2013       Allergies:      Allergies   Allergen Reactions    Pcn [Penicillins] Rash and Swelling    Robitussin [Guaifenesin] Hives       Surgical History:     Past Surgical History:   Procedure Laterality Date    CARDIAC SURG PROCEDURE UNLIST      HX HERNIA REPAIR      left inguinal x 2 Social History:     Social History     Socioeconomic History    Marital status:      Spouse name: Not on file    Number of children: Not on file    Years of education: Not on file    Highest education level: Not on file   Tobacco Use    Smoking status: Never Smoker    Smokeless tobacco: Never Used   Substance and Sexual Activity    Alcohol use: No    Drug use: No       Review of Symptoms:  Constitutional: c/o malaise, denies fever or chills  Skin: Negative for rash or lesion  Head: Negative for facial swelling or tenderness  Eyes: Negative for redness or discharge  Ears: Negative for otalgia or decreased hearing  Nose: c/o nasal congestion, denies sinus pressure  Neck: Negative for sore throat, lymphadenopathy   Cardiovascular: Negative for chest pain or palpitations  Respiratory: c/o non-productive cough, denies wheezing or SOB  Gastrointestinal: Negative for nausea or abdominal pain  Musculoskeletal: c/o multiple arthralgias  Neurologic: Negative for headache or dizziness      Visit Vitals  /74 (BP 1 Location: Left arm, BP Patient Position: Sitting)   Pulse 92   Temp 98 °F (36.7 °C) (Oral)   Resp 16   Ht 5' 7\" (1.702 m)   Wt 174 lb (78.9 kg)   SpO2 95%   BMI 27.25 kg/m²       Physical Examination:  General: Well developed, well nourished, in no acute distress  Skin: Warm and dry, normal tone and turgor  Head: Normocephalic, atraumatic  Eyes: Sclera clear, EOMI, PERRL  Nose: mucosal edema   Oropharynx: posterior erythema, sans exudate   Neck: Normal range of motion, no lymphadenopathy  Cardiovascular: normal S1, S2, regular rate and rhythm  Respiratory: Clear to auscultation bilaterally with symmetrical, unlabored effort  Abdomen: Soft, Normal BS  Extremities: Full passive range of motion, left shoulder tenderness with external rotation, anterior right knee TTP, reports discomfort with finger extension  Neurologic: Active, alert and oriented      Diagnoses and all orders for this visit: 1. Flu-like symptoms  -     CBC WITH AUTOMATED DIFF    2. Myalgia  -     CRP, HIGH SENSITIVITY  -     SED RATE (ESR)    3. Arthralgia, unspecified joint  -     CRP, HIGH SENSITIVITY  -     SED RATE (ESR)    4. Other elevated white blood cell (WBC) count  -     CBC WITH AUTOMATED DIFF  -     METABOLIC PANEL, COMPREHENSIVE  -     AMB POC URINALYSIS DIP STICK MANUAL W/O MICRO  -     AMB POC URINE, MICROALBUMIN, SEMIQUANT (3 RESULTS)    5. Type 2 diabetes mellitus without complication, without long-term current use of insulin (HCC)  -     METABOLIC PANEL, COMPREHENSIVE    6. Prostate cancer ([facility]  -     PSA, DIAGNOSTIC (PROSTATE SPECIFIC AG)    7. Chronic systolic congestive heart failure ([facility]    8. Acquired hypothyroidism        Symptomatic therapy suggested: rest and call prn if symptoms persist or worsen. I have discussed the diagnosis with the patient and the intended plan as seen in the above orders. The patient expresses understanding and agreement with our plan of care. All of the patient's questions were answered to apparent satisfaction. The patient has received an after-visit summary. The patient knows to call our office if there are any questions or concerns regarding diagnosis and treatment plans. I have discussed medication side effects and warnings with the patient as well. Follow-up Disposition:  Return in about 3 days (around 2/28/2019), or if symptoms worsen or fail to improve.

## 2019-02-27 ENCOUNTER — HOSPITAL ENCOUNTER (EMERGENCY)
Age: 79
Discharge: HOME | End: 2019-02-27
Attending: EMERGENCY MEDICINE
Payer: MEDICARE

## 2019-02-27 ENCOUNTER — DOCUMENTATION ONLY (OUTPATIENT)
Dept: FAMILY MEDICINE CLINIC | Age: 79
End: 2019-02-27

## 2019-02-27 ENCOUNTER — APPOINTMENT (OUTPATIENT)
Dept: CT IMAGING | Age: 79
End: 2019-02-27
Attending: EMERGENCY MEDICINE
Payer: MEDICARE

## 2019-02-27 ENCOUNTER — TELEPHONE (OUTPATIENT)
Dept: FAMILY MEDICINE CLINIC | Age: 79
End: 2019-02-27

## 2019-02-27 VITALS
WEIGHT: 180 LBS | OXYGEN SATURATION: 95 % | DIASTOLIC BLOOD PRESSURE: 53 MMHG | TEMPERATURE: 99.4 F | BODY MASS INDEX: 28.93 KG/M2 | HEIGHT: 66 IN | RESPIRATION RATE: 18 BRPM | HEART RATE: 83 BPM | SYSTOLIC BLOOD PRESSURE: 97 MMHG

## 2019-02-27 DIAGNOSIS — R89.9 ABNORMAL LABORATORY TEST: Primary | ICD-10-CM

## 2019-02-27 LAB
ALBUMIN SERPL-MCNC: 2.5 G/DL (ref 3.5–5)
ALBUMIN/GLOB SERPL: 0.5 {RATIO} (ref 1.1–2.2)
ALP SERPL-CCNC: 359 U/L (ref 45–117)
ALT SERPL-CCNC: 104 U/L (ref 12–78)
ANION GAP SERPL CALC-SCNC: 10 MMOL/L (ref 5–15)
AST SERPL-CCNC: 43 U/L (ref 15–37)
BASOPHILS # BLD: 0.1 K/UL (ref 0–0.1)
BASOPHILS NFR BLD: 0 % (ref 0–1)
BILIRUB SERPL-MCNC: 0.8 MG/DL (ref 0.2–1)
BUN SERPL-MCNC: 21 MG/DL (ref 6–20)
BUN/CREAT SERPL: 21 (ref 12–20)
CALCIUM SERPL-MCNC: 10.5 MG/DL (ref 8.5–10.1)
CHLORIDE SERPL-SCNC: 98 MMOL/L (ref 97–108)
CK SERPL-CCNC: 30 U/L (ref 39–308)
CO2 SERPL-SCNC: 26 MMOL/L (ref 21–32)
COMMENT, HOLDF: NORMAL
CREAT SERPL-MCNC: 1.02 MG/DL (ref 0.7–1.3)
DIFFERENTIAL METHOD BLD: ABNORMAL
EOSINOPHIL # BLD: 0.1 K/UL (ref 0–0.4)
EOSINOPHIL NFR BLD: 1 % (ref 0–7)
ERYTHROCYTE [DISTWIDTH] IN BLOOD BY AUTOMATED COUNT: 12.5 % (ref 11.5–14.5)
GLOBULIN SER CALC-MCNC: 5.2 G/DL (ref 2–4)
GLUCOSE SERPL-MCNC: 157 MG/DL (ref 65–100)
HAV IGM SER QL: NONREACTIVE
HBV CORE IGM SER QL: NONREACTIVE
HBV SURFACE AG SER QL: <0.1 INDEX
HBV SURFACE AG SER QL: NEGATIVE
HCT VFR BLD AUTO: 36.9 % (ref 36.6–50.3)
HCV AB SERPL QL IA: NONREACTIVE
HCV COMMENT,HCGAC: NORMAL
HGB BLD-MCNC: 11.8 G/DL (ref 12.1–17)
IMM GRANULOCYTES # BLD AUTO: 0.1 K/UL (ref 0–0.04)
IMM GRANULOCYTES NFR BLD AUTO: 1 % (ref 0–0.5)
LIPASE SERPL-CCNC: 137 U/L (ref 73–393)
LYMPHOCYTES # BLD: 1.3 K/UL (ref 0.8–3.5)
LYMPHOCYTES NFR BLD: 9 % (ref 12–49)
MCH RBC QN AUTO: 31.3 PG (ref 26–34)
MCHC RBC AUTO-ENTMCNC: 32 G/DL (ref 30–36.5)
MCV RBC AUTO: 97.9 FL (ref 80–99)
MONOCYTES # BLD: 1.1 K/UL (ref 0–1)
MONOCYTES NFR BLD: 7 % (ref 5–13)
NEUTS SEG # BLD: 12.1 K/UL (ref 1.8–8)
NEUTS SEG NFR BLD: 82 % (ref 32–75)
NRBC # BLD: 0 K/UL (ref 0–0.01)
NRBC BLD-RTO: 0 PER 100 WBC
PLATELET # BLD AUTO: 436 K/UL (ref 150–400)
PMV BLD AUTO: 8.9 FL (ref 8.9–12.9)
POTASSIUM SERPL-SCNC: 4.5 MMOL/L (ref 3.5–5.1)
PROT SERPL-MCNC: 7.7 G/DL (ref 6.4–8.2)
RBC # BLD AUTO: 3.77 M/UL (ref 4.1–5.7)
SAMPLES BEING HELD,HOLD: NORMAL
SODIUM SERPL-SCNC: 134 MMOL/L (ref 136–145)
SP1: NORMAL
SP2: NORMAL
SP3: NORMAL
WBC # BLD AUTO: 14.7 K/UL (ref 4.1–11.1)

## 2019-02-27 PROCEDURE — 80053 COMPREHEN METABOLIC PANEL: CPT

## 2019-02-27 PROCEDURE — 80074 ACUTE HEPATITIS PANEL: CPT

## 2019-02-27 PROCEDURE — 83690 ASSAY OF LIPASE: CPT

## 2019-02-27 PROCEDURE — 36415 COLL VENOUS BLD VENIPUNCTURE: CPT

## 2019-02-27 PROCEDURE — 85025 COMPLETE CBC W/AUTO DIFF WBC: CPT

## 2019-02-27 PROCEDURE — 99283 EMERGENCY DEPT VISIT LOW MDM: CPT

## 2019-02-27 PROCEDURE — 74177 CT ABD & PELVIS W/CONTRAST: CPT

## 2019-02-27 PROCEDURE — 82550 ASSAY OF CK (CPK): CPT

## 2019-02-27 PROCEDURE — 74011636320 HC RX REV CODE- 636/320

## 2019-02-27 RX ADMIN — IOPAMIDOL 100 ML: 755 INJECTION, SOLUTION INTRAVENOUS at 13:33

## 2019-02-27 NOTE — ED PROVIDER NOTES
68y M here with diffuse joint pain for 3 weeks. Hard to get up out of bed. No nausea or vomiting. No abdominal pain. No chest pain or trouble breathing. Appetite decreased but still taking PO well. Doctor called today and told to come to the ED. No rashes or skin changes. Stopped his statins 2 days ago to see if it would help and is feeling better since then. Joaquim Bosch is a 69 y.o. male who presents ambulatory to the ED with  c/o abnormal labs. Was sent by his PCP for further evaluation and treatment per prior triage note. Denies any chest pain, shortness of breath or dizziness. States he has diffuse joint pain and myalgias. States the myalgias have ceased after stopping his statins. Was also told by his PCP that his Thor Mattes was off. \" Patient denies any abdominal pain, no nausea or vomiting. No fever or chills. PCP: Stan Farooq MD 
 
PMHx significant for: Past Medical History: 
No date: Cardiomyopathy, dilated, nonischemic ([facility] No date: Colon polyps No date: Hypercholesterolemia No date: [facility] Comment:  [address], watch and wait strategy No date: [facility] spotted fever) 8/21/2013: T2DM (type 2 diabetes mellitus) ([facility] PSHx significant for: Past Surgical History: 
No date: CARDIAC SURG PROCEDURE UNLIST No date: HX HERNIA REPAIR Comment:  left inguinal x 2 There are no further complaints or symptoms at this time. Past Medical History:  
Diagnosis Date  Cardiomyopathy, dilated, nonischemic (HCC)  Colon polyps  Hypercholesterolemia  Prostate cancer ([facility] [address], watch and wait strategy  [facility] spotted fever)  T2DM (type 2 diabetes mellitus) ([facility] 8/21/2013 Past Surgical History:  
Procedure Laterality Date  CARDIAC SURG PROCEDURE UNLIST  HX HERNIA REPAIR    
 left inguinal x 2 Family History:  
Problem Relation Age of Onset  Cancer Father      GI  
  Hypertension Mother  Diabetes Mother  Cancer Sister   
     breast  
 
 
Social History Socioeconomic History  Marital status:  Spouse name: Not on file  Number of children: Not on file  Years of education: Not on file  Highest education level: Not on file Social Needs  Financial resource strain: Not on file  Food insecurity - worry: Not on file  Food insecurity - inability: Not on file  Transportation needs - medical: Not on file  Transportation needs - non-medical: Not on file Occupational History  Not on file Tobacco Use  Smoking status: Never Smoker  Smokeless tobacco: Never Used Substance and Sexual Activity  Alcohol use: No  
 Drug use: No  
 Sexual activity: Not on file Other Topics Concern  Not on file Social History Narrative  Not on file ALLERGIES: Pcn [penicillins] and Robitussin [guaifenesin] Review of Systems Constitutional: Negative for activity change, appetite change, chills, fatigue and fever. HENT: Negative for congestion, ear discharge, ear pain, sinus pressure, sinus pain, sore throat and trouble swallowing. Eyes: Negative for photophobia, pain, redness, itching and visual disturbance. Respiratory: Negative for chest tightness and shortness of breath. Cardiovascular: Negative for chest pain and palpitations. Gastrointestinal: Negative for abdominal distention, abdominal pain, nausea and vomiting. Endocrine: Negative. Genitourinary: Negative for difficulty urinating, frequency and urgency. Musculoskeletal: Positive for myalgias (now resolved). Negative for back pain, neck pain and neck stiffness. Positive joint tenderness Skin: Negative for color change, pallor, rash and wound. Allergic/Immunologic: Negative. Neurological: Negative for dizziness, syncope, weakness and headaches. Hematological: Does not bruise/bleed easily. Psychiatric/Behavioral: Negative for behavioral problems. The patient is not nervous/anxious. Review of Systems Constitutional: (-) weight loss. HEENT: (-) stiff neck Eyes: (-) discharge. Respiratory: (-) cough. Cardiovascular: (-) syncope. Gastrointestinal: (-) blood in stool. Genitourinary: (-) hematuria. Musculoskeletal: (-) myalgias. Neurological: (-) seizure. Skin: (-) petechiae Lymph/Immunologic: (-) enlarged lymph nodes All other systems reviewed and are negative. There were no vitals filed for this visit. Physical Exam  
Constitutional: He is oriented to person, place, and time. He appears well-developed and well-nourished. No distress. HENT:  
Head: Normocephalic and atraumatic. Right Ear: External ear normal.  
Left Ear: External ear normal.  
Nose: Nose normal.  
Mouth/Throat: Oropharynx is clear and moist.  
Eyes: Conjunctivae and EOM are normal. Pupils are equal, round, and reactive to light. Right eye exhibits no discharge. Left eye exhibits no discharge. Neck: Normal range of motion. Neck supple. No JVD present. No tracheal deviation present. Cardiovascular: Normal rate, regular rhythm, normal heart sounds and intact distal pulses. Exam reveals no gallop. No murmur heard. Pulmonary/Chest: Effort normal and breath sounds normal. No respiratory distress. He has no wheezes. He has no rales. He exhibits no tenderness. Abdominal: Soft. Bowel sounds are normal. He exhibits no distension. There is no tenderness. There is no rebound and no guarding. Genitourinary:  
Genitourinary Comments: Negative Musculoskeletal: Normal range of motion. He exhibits tenderness (joint tenderness, diffuse). He exhibits no edema. Neurological: He is alert and oriented to person, place, and time. Skin: Skin is warm and dry. No rash noted. No erythema. No pallor. Psychiatric: He has a normal mood and affect.  His behavior is normal. Judgment and thought content normal.  
Nursing note and vitals reviewed. Nursing note and vitals reviewed. Constitutional: oriented to person, place, and time. appears well-developed and well-nourished. No distress. Head: Normocephalic and atraumatic. Sclera anicteric Nose: No rhinorrhea Mouth/Throat: Oropharynx is clear and moist. Pharynx normal 
Eyes: Conjunctivae are normal. Pupils are equal, round, and reactive to light. Right eye exhibits no discharge. Left eye exhibits no discharge. Neck: Painless normal range of motion. Neck supple. No LAD. Cardiovascular: Normal rate, regular rhythm, normal heart sounds and intact distal pulses. Exam reveals no gallop and no friction rub. No murmur heard. Pulmonary/Chest:  No respiratory distress. No wheezes. No rales. No rhonchi. No increased work of breathing. No accessory muscle use. Good air exchange throughout. Abdominal: soft, non-tender, no rebound or guarding. No hepatosplenomegaly. Normal bowel sounds throughout. Back: no tenderness to palpation, no deformities, no CVA tenderness Extremities/Musculoskeletal: Normal range of motion. no tenderness. No edema. Distal extremities are neurovasc intact. Lymphadenopathy:   No adenopathy. Neurological:  Alert and oriented to person, place, and time. Coordination normal. CN 2-12 intact. Motor and sensory function intact. Skin: Skin is warm and dry. No rash noted. No pallor. MDM Number of Diagnoses or Management Options Abnormal laboratory test: established and improving Diagnosis management comments: Plan: 
Discharge to home and follow up with PCP. Amount and/or Complexity of Data Reviewed Clinical lab tests: ordered and reviewed Discuss the patient with other providers: yes (Discussed plan of care with Dr. Jayson Fine) 68y M here with joint pains for several weeks. Better after stopping statin 2 days ago. Appears well here.  Labs reassuring and without sig change from what was checked recently - will have him follow-up with GI for slight elevation in LFT's. Will also need further evaluation by his urologist as his PMD checked PSA recently that was slightly elevated and also with small, non-specific liver lesions. CK is normal. Will dc with close PMD follow-up. Procedures

## 2019-02-27 NOTE — TELEPHONE ENCOUNTER
Called patient's daughter, Cary Carr, to advise, patient needs to proceed to ER. Patient with history of prostate cancer, complaining of pain, and lab values are abnormal, per Dr. Jnaie Horvath. Called placed to patient's Urologist, Dr Amber Heath, per patient request to advise them on patient's condition. Patient was scheduled to see them next Wednesday, 3/6/19. Left message for nurse to call back.

## 2019-02-27 NOTE — ED TRIAGE NOTES
The patient complains of joint pain especially in the left hand and fingers that began 3 weeks ago, States he was seen be his provider yesterday and had some lab work completed. Sent to ER for evaluation of abnormal lab results.

## 2019-02-28 LAB
ALBUMIN SERPL-MCNC: 3.5 G/DL (ref 3.5–4.8)
ALBUMIN/GLOB SERPL: 1.1 {RATIO} (ref 1.2–2.2)
ALP SERPL-CCNC: 374 IU/L (ref 39–117)
ALT SERPL-CCNC: 97 IU/L (ref 0–44)
AST SERPL-CCNC: 45 IU/L (ref 0–40)
BASOPHILS # BLD AUTO: 0 X10E3/UL (ref 0–0.2)
BASOPHILS NFR BLD AUTO: 0 %
BILIRUB SERPL-MCNC: 1.1 MG/DL (ref 0–1.2)
BUN SERPL-MCNC: 21 MG/DL (ref 8–27)
BUN/CREAT SERPL: 19 (ref 10–24)
CALCIUM SERPL-MCNC: 10.1 MG/DL (ref 8.6–10.2)
CHLORIDE SERPL-SCNC: 96 MMOL/L (ref 96–106)
CO2 SERPL-SCNC: 23 MMOL/L (ref 20–29)
CREAT SERPL-MCNC: 1.1 MG/DL (ref 0.76–1.27)
CRP SERPL HS-MCNC: >300 MG/L (ref 0–3)
EOSINOPHIL # BLD AUTO: 0.1 X10E3/UL (ref 0–0.4)
EOSINOPHIL NFR BLD AUTO: 1 %
ERYTHROCYTE [DISTWIDTH] IN BLOOD BY AUTOMATED COUNT: 13 % (ref 12.3–15.4)
ERYTHROCYTE [SEDIMENTATION RATE] IN BLOOD BY WESTERGREN METHOD: 64 MM/HR (ref 0–30)
GLOBULIN SER CALC-MCNC: 3.2 G/DL (ref 1.5–4.5)
GLUCOSE SERPL-MCNC: 231 MG/DL (ref 65–99)
HCT VFR BLD AUTO: 36 % (ref 37.5–51)
HGB BLD-MCNC: 11.9 G/DL (ref 13–17.7)
IMM GRANULOCYTES # BLD AUTO: 0 X10E3/UL (ref 0–0.1)
IMM GRANULOCYTES NFR BLD AUTO: 0 %
LYMPHOCYTES # BLD AUTO: 0.9 X10E3/UL (ref 0.7–3.1)
LYMPHOCYTES NFR BLD AUTO: 6 %
MCH RBC QN AUTO: 32.1 PG (ref 26.6–33)
MCHC RBC AUTO-ENTMCNC: 33.1 G/DL (ref 31.5–35.7)
MCV RBC AUTO: 97 FL (ref 79–97)
MONOCYTES # BLD AUTO: 1 X10E3/UL (ref 0.1–0.9)
MONOCYTES NFR BLD AUTO: 7 %
NEUTROPHILS # BLD AUTO: 12.3 X10E3/UL (ref 1.4–7)
NEUTROPHILS NFR BLD AUTO: 86 %
PLATELET # BLD AUTO: 471 X10E3/UL (ref 150–379)
POTASSIUM SERPL-SCNC: 5 MMOL/L (ref 3.5–5.2)
PROT SERPL-MCNC: 6.7 G/DL (ref 6–8.5)
PSA SERPL-MCNC: 6.7 NG/ML (ref 0–4)
RBC # BLD AUTO: 3.71 X10E6/UL (ref 4.14–5.8)
SODIUM SERPL-SCNC: 137 MMOL/L (ref 134–144)
WBC # BLD AUTO: 14.3 X10E3/UL (ref 3.4–10.8)

## 2019-03-01 ENCOUNTER — OFFICE VISIT (OUTPATIENT)
Dept: FAMILY MEDICINE CLINIC | Age: 79
End: 2019-03-01

## 2019-03-01 VITALS
WEIGHT: 170 LBS | SYSTOLIC BLOOD PRESSURE: 111 MMHG | DIASTOLIC BLOOD PRESSURE: 75 MMHG | OXYGEN SATURATION: 94 % | BODY MASS INDEX: 27.32 KG/M2 | HEART RATE: 47 BPM | HEIGHT: 66 IN | TEMPERATURE: 98 F | RESPIRATION RATE: 16 BRPM

## 2019-03-01 DIAGNOSIS — R68.89 FLU-LIKE SYMPTOMS: Primary | ICD-10-CM

## 2019-03-01 DIAGNOSIS — M79.10 MYALGIA: ICD-10-CM

## 2019-03-01 DIAGNOSIS — R76.8 EBV SEROPOSITIVITY: ICD-10-CM

## 2019-03-01 DIAGNOSIS — I25.5 ISCHEMIC CARDIOMYOPATHY: ICD-10-CM

## 2019-03-01 NOTE — PROGRESS NOTES
1. Have you been to the ER, urgent care clinic since your last visit? Hospitalized since your last visit? Yes When: [facility], 02/27/2019, bloodwork results; told to stop simvastatin. 2. Have you seen or consulted any other health care providers outside of the [address] [place] [address] since your last visit? Include any pap smears or colon screening.  No  Reviewed record in preparation for visit and have necessary documentation  Pt did not bring medication to office visit for review    Goals that were addressed and/or need to be completed during or after this appointment include     Health Maintenance Due   Topic Date Due    Shingrix Vaccine Age 50> (1 of 2) 08/21/1991

## 2019-03-02 LAB
EBV EA IGG SER-ACNC: <9 U/ML (ref 0–8.9)
EBV NA IGG SER IA-ACNC: 387 U/ML (ref 0–17.9)
EBV VCA IGG SER IA-ACNC: 474 U/ML (ref 0–17.9)
EBV VCA IGM SER IA-ACNC: <36 U/ML (ref 0–35.9)
SERVICE CMNT-IMP: ABNORMAL

## 2019-03-05 ENCOUNTER — TELEPHONE (OUTPATIENT)
Dept: FAMILY MEDICINE CLINIC | Age: 79
End: 2019-03-05

## 2019-03-05 NOTE — TELEPHONE ENCOUNTER
Called patient to advise per Colleen Wiley results show he is recovering from [address] No answer, left message to call back.

## 2019-03-11 ENCOUNTER — OFFICE VISIT (OUTPATIENT)
Dept: FAMILY MEDICINE CLINIC | Age: 79
End: 2019-03-11

## 2019-03-11 VITALS
SYSTOLIC BLOOD PRESSURE: 119 MMHG | RESPIRATION RATE: 16 BRPM | BODY MASS INDEX: 27.1 KG/M2 | HEART RATE: 46 BPM | TEMPERATURE: 99 F | HEIGHT: 66 IN | WEIGHT: 168.6 LBS | DIASTOLIC BLOOD PRESSURE: 68 MMHG | OXYGEN SATURATION: 96 %

## 2019-03-11 DIAGNOSIS — B27.09 EBV HEPATITIS: Primary | ICD-10-CM

## 2019-03-11 DIAGNOSIS — B17.8 EBV HEPATITIS: Primary | ICD-10-CM

## 2019-03-11 DIAGNOSIS — Z78.9 STATIN INTOLERANCE: ICD-10-CM

## 2019-03-11 NOTE — PROGRESS NOTES
1. Have you been to the ER, urgent care clinic since your last visit? Hospitalized since your last visit? No    2. Have you seen or consulted any other health care providers outside of the [address] [place] [address] since your last visit? Include any pap smears or colon screening.  No  Reviewed record in preparation for visit and have necessary documentation  Pt did not bring medication to office visit for review  Information was given to pt on Advanced Directives, Living Will  Information was given on Shingles Vaccine  opportunity was given for questions  Goals that were addressed and/or need to be completed during or after this appointment include     Health Maintenance Due   Topic Date Due    Shingrix Vaccine Age 50> (1 of 2) 08/21/1991

## 2019-03-11 NOTE — PATIENT INSTRUCTIONS
Lab Results   Component Value Date/Time    ALT (SGPT) 104 (H) 02/27/2019 11:24 AM    AST (SGOT) 43 (H) 02/27/2019 11:24 AM    Alk.  phosphatase 359 (H) 02/27/2019 11:24 AM    Bilirubin, total 0.8 02/27/2019 11:24 AM

## 2019-03-11 NOTE — PROGRESS NOTES
Patient: Delvin Malcolm MRN: [id number]  SSN: [id number]    YOB: 1940  Age: 69 y.o. Sex: male      Chief Complaint   Patient presents with    Follow-up     ER told to stop simvastatin     Delvin Malcolm is a 69 y.o. male new to me who presents with son-in-law for f/u of two ED encounters. Patient with acute flu-like illness with elevated liver enzyme, elevated neutrophils and monocytes. CXR unremarkable. Rapid flu negative in ED. Patient with hx of HLD, hypothyroidism, chronic systolic CHF, prostate cancer, and ischemic cardiomyopathy. Patient says he was in his normal state of health when he developed malaise, fever, acute myalgias, arthralgias, weakness, cough described as productive of clear sputum, and loss of appetite ~2 week ago. There has been no nausea or vomiting . he has not had a sore throat, swollen glands or night sweats. Today he says symptoms have improved. He has held statin since ED encounter. There have not been contacts with similar infections. Wt Readings from Last 3 Encounters:   03/01/19 170 lb (77.1 kg)   02/27/19 180 lb (81.6 kg)   02/25/19 174 lb (78.9 kg)     Body mass index is 27.44 kg/m². BP Readings from Last 3 Encounters:   03/01/19 111/75   02/27/19 97/53   02/25/19 112/74         Medications:     Current Outpatient Medications   Medication Sig    carvedilol (COREG) 3.125 mg tablet TAKE 1 TABLET TWICE A DAY WITH MEALS    levothyroxine (SYNTHROID) 50 mcg tablet TAKE 1 TABLET DAILY BEFORE BREAKFAST    glucose blood VI test strips (FREESTYLE LITE STRIPS) strip USE TO TEST ONCE DAILY    trandolapril (MAVIK) 2 mg tablet TAKE 1 TABLET NIGHTLY FOR LEFT VENTRICULAR DYSFUNCTION FOLLOWING MI    FREESTYLE LANCETS 28 gauge misc USE TO TEST DAILY    aspirin delayed-release 81 mg tablet Take  by mouth daily.  Cholecalciferol, Vitamin D3, (VITAMIN D3) 1,000 unit cap Take  by mouth daily.  fish oil-dha-epa (FISH OIL) 1,200-144-216 mg Cap Take  by mouth.     VITAMIN B COMPLEX (B COMPLEX PO) Take  by mouth.  PV W-O MIKO/FERROUS FUMARATE/FA (M-VIT PO) Take  by mouth.  varicella-zoster [facility]) injection Shingrix, one injection now and repeat in 4-6 months. To be administered at Pharmacy. Fax confirmation to me at [phone].  simvastatin (ZOCOR) 20 mg tablet TAKE 1 TABLET NIGHTLY     No current facility-administered medications for this visit. Problem List:     Patient Active Problem List    Diagnosis Date Noted    Dyslipidemia 05/16/2018    Chronic systolic congestive heart failure ([facility] 05/16/2018    Controlled maturity onset diabetes mellitus in young (ROMINA) type 2 with complication ([facility] 27/22/3359    Mild 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 edema associated with diabetes mellitus due to underlying condition ([facility] 12/15/2015    [facility] 12/15/2015    Ischemic cardiomyopathy 01/06/2015    [facility] spotted fever)     Asymptomatic PVCs 08/21/2013    Hypothyroidism 09/05/2012    Colon polyps 05/31/2010       Medical History:     Past Medical History:   Diagnosis Date    Cardiomyopathy, dilated, nonischemic ([facility]     Colon polyps     Hypercholesterolemia     Prostate cancer ([facility]     [address]iel[address], watch and wait strategy    [facility] spotted fever)     T2DM (type 2 diabetes mellitus) ([facility] 8/21/2013       Allergies:      Allergies   Allergen Reactions    Pcn [Penicillins] Rash and Swelling    Robitussin [Guaifenesin] Hives       Surgical History:     Past Surgical History:   Procedure Laterality Date    CARDIAC SURG PROCEDURE UNLIST      HX HERNIA REPAIR      left inguinal x 2       Social History:     Social History     Socioeconomic History    Marital status:      Spouse name: Not on file    Number of children: Not on file    Years of education: Not on file    Highest education level: Not on file   Tobacco Use    Smoking status: Never Smoker    Smokeless tobacco: Never Used Substance and Sexual Activity    Alcohol use: No    Drug use: No       Review of Symptoms:  Constitutional: c/o malaise, denies fever or chills  Skin: Negative for rash or lesion  Head: Negative for facial swelling or tenderness  Eyes: Negative for redness or discharge  Ears: Negative for otalgia or decreased hearing  Nose: c/o nasal congestion, denies sinus pressure  Neck: Negative for sore throat, lymphadenopathy   Cardiovascular: Negative for chest pain or palpitations  Respiratory: c/o non-productive cough, denies wheezing or SOB  Gastrointestinal: Negative for nausea or abdominal pain  Musculoskeletal: c/o multiple arthralgias  Neurologic: Negative for headache or dizziness      Visit Vitals  /75 (BP 1 Location: Left arm, BP Patient Position: Sitting)   Pulse (!) 47   Temp 98 °F (36.7 °C)   Resp 16   Ht 5' 6\" (1.676 m)   Wt 170 lb (77.1 kg)   SpO2 94%   BMI 27.44 kg/m²       Physical Examination:  General: Well developed, well nourished, in no acute distress  Skin: Warm and dry, normal tone and turgor  Head: Normocephalic, atraumatic  Eyes: Sclera clear, EOMI, PERRL  Nose: mucosal edema   Oropharynx: posterior erythema, sans exudate   Neck: Normal range of motion, no lymphadenopathy  Cardiovascular: normal S1, S2, regular rate and rhythm  Respiratory: Clear to auscultation bilaterally with symmetrical, unlabored effort  Extremities: Full passive range of motion, left shoulder tenderness   Neurologic: Active, alert and oriented      Diagnoses and all orders for this visit:    1. Flu-like symptoms  -     ISIDRO BARR VIRUS AB PANEL    2. EBV seropositivity    3. Myalgia    4. Ischemic cardiomyopathy        Symptomatic therapy suggested: rest and call prn if symptoms persist or worsen. I have discussed the diagnosis with the patient and the intended plan as seen in the above orders. The patient expresses understanding and agreement with our plan of care.    All of the patient's questions were answered to apparent satisfaction. The patient has received an after-visit summary. The patient knows to call our office if there are any questions or concerns regarding diagnosis and treatment plans. I have discussed medication side effects and warnings with the patient as well. Follow-up Disposition:  Return in about 10 days (around 3/11/2019).

## 2019-03-11 NOTE — PROGRESS NOTES
[address]  [phone]           Progress Note    Patient: Ned Watkins MRN: [id number]  SSN: [id number]    YOB: 1940  Age: 69 y.o. Sex: male        Chief Complaint   Patient presents with    Follow-up         Subjective:     Encounter Diagnoses   Name Primary?  EBV hepatitis: He is feeling better. He can actually move his thumbs now. He is still fatigued. The clinical question that we will be faced with his went and whether or not to start him back on statins. 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 I do not think this is a primary statin hepatitis. He will see Dr. Raz Lay soon and we will get his input as well. Certainly do not want him on a statin for at least 6 weeks assuming his liver function returns to normal.  He needs repeat liver testing today. Yes    Statin intolerance: See above discussion. Within 2 days of stopping his statin he felt better. Current and past medical information:    Current Medications after this visit[de-identified]     Current Outpatient Medications   Medication Sig    carvedilol (COREG) 3.125 mg tablet TAKE 1 TABLET TWICE A DAY WITH MEALS    levothyroxine (SYNTHROID) 50 mcg tablet TAKE 1 TABLET DAILY BEFORE BREAKFAST    glucose blood VI test strips (FREESTYLE LITE STRIPS) strip USE TO TEST ONCE DAILY    simvastatin (ZOCOR) 20 mg tablet TAKE 1 TABLET NIGHTLY    trandolapril (MAVIK) 2 mg tablet TAKE 1 TABLET NIGHTLY FOR LEFT VENTRICULAR DYSFUNCTION FOLLOWING MI    FREESTYLE LANCETS 28 gauge misc USE TO TEST DAILY    aspirin delayed-release 81 mg tablet Take  by mouth daily.  Cholecalciferol, Vitamin D3, (VITAMIN D3) 1,000 unit cap Take  by mouth daily.  fish oil-dha-epa (FISH OIL) 1,200-144-216 mg Cap Take  by mouth.  VITAMIN B COMPLEX (B COMPLEX PO) Take  by mouth.     PV W-O MIKO/FERROUS FUMARATE/FA (M-VIT PO) Take  by mouth.  varicella-zoster [facility]) injection Shingrix, one injection now and repeat in 4-6 months. To be administered at Pharmacy. Fax confirmation to me at [phone]. No current facility-administered medications for this visit. 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 ([facility] 56/30/5880     Priority: 1 - One    Mild 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 edema associated with diabetes mellitus due to underlying condition ([facility] 12/15/2015     Priority: 1 - One    Prostate cancer ([facility] 12/15/2015     Priority: 1 - One    Asymptomatic PVCs 08/21/2013     Priority: 1 - One    Hypothyroidism 09/05/2012     Priority: 1 - One    Dyslipidemia 05/16/2018    Ischemic cardiomyopathy 01/06/2015    [facility] spotted fever)     Colon polyps 05/31/2010       Past Medical History:   Diagnosis Date    Cardiomyopathy, dilated, nonischemic ([facility]     Colon polyps     Hypercholesterolemia     Prostate cancer ([facility]     [address], watch and wait strategy    [facility] spotted fever)     T2DM (type 2 diabetes mellitus) ([facility] 8/21/2013       Allergies   Allergen Reactions    Pcn [Penicillins] Rash and Swelling    Robitussin [Guaifenesin] Hives       Past Surgical History:   Procedure Laterality Date    CARDIAC SURG PROCEDURE UNLIST      HX HERNIA REPAIR      left inguinal x 2       Social History     Socioeconomic History    Marital status:      Spouse name: Not on file    Number of children: Not on file    Years of education: Not on file    Highest education level: Not on file   Tobacco Use    Smoking status: Never Smoker    Smokeless tobacco: Never Used   Substance and Sexual Activity    Alcohol use: No    Drug use:  No Review of Systems   Constitutional: Positive for malaise/fatigue. Negative for chills, fever and weight loss. He still had fatigue. He is back to walking every day. 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 He has not been around anybody with infectious mononucleosis that he knows about. From his titers this could have been a reactivated infection. HENT: Negative. Negative for hearing loss. Eyes: Negative. Negative for blurred vision and double vision. Respiratory: Negative. Negative for cough, hemoptysis, sputum production and shortness of breath. Cardiovascular: Negative. Negative for chest pain, palpitations and orthopnea. Gastrointestinal: Negative. Negative for abdominal pain, blood in stool, heartburn, nausea and vomiting. Genitourinary: Negative. Negative for dysuria, frequency and urgency. Musculoskeletal: Positive for myalgias. Negative for back pain and neck pain. Myalgias are better. Skin: Negative. Negative for rash. Neurological: Negative. Negative for dizziness, tingling, tremors, weakness and headaches. Endo/Heme/Allergies: Negative. Psychiatric/Behavioral: Negative. Negative for depression. Objective:     Vitals:    03/11/19 1305   BP: 119/68   Pulse: (!) 46   Resp: 16   Temp: 99 °F (37.2 °C)   TempSrc: Oral   SpO2: 96%   Weight: 168 lb 9.6 oz (76.5 kg)   Height: 5' 6\" (1.676 m)      Body mass index is 27.21 kg/m². Physical Exam   Constitutional: He is oriented to person, place, and time and well-developed, well-nourished, and in no distress. HENT:   Head: Normocephalic and atraumatic. Mouth/Throat: Oropharynx is clear and moist.   Eyes: Right eye exhibits no discharge. Left eye exhibits no discharge. No scleral icterus. Neck:   No bruit. Cardiovascular: Normal rate, regular rhythm and normal heart sounds. Pulmonary/Chest: Effort normal and breath sounds normal.   Abdominal: Soft. He exhibits no distension and no mass.  There is no tenderness. Genitourinary:   Genitourinary Comments: He saw Dr. Maximiliano Floyd to address his elevated PSA. He also had a hernia involving the dome of his bladder. Dr. Chuck Pillai will recheck him in 3 months. He did not think anything needed to be done acutely. Neurological: He is alert and oriented to person, place, and time. Skin: No rash noted. No erythema. Psychiatric: Mood and affect normal.   Nursing note and vitals reviewed. I reviewed the labs and CT reading with the patient. He did not have any enlargement of his spleen on the CT. Health Maintenance Due   Topic Date Due    Shingrix Vaccine Age 49> (1 of 2) 08/21/1991         Assessment and orders:     Encounter Diagnoses     ICD-10-CM ICD-9-CM   1. EBV hepatitis B27.09 075    B17.8 573.1   2. Statin intolerance Z78.9 995.27     Diagnoses and all orders for this visit:    1. EBV hepatitis-recheck liver enzymes. See me in 4 weeks. -     HEPATIC FUNCTION PANEL    2. Statin intolerance-statin certainly contributed to his illness. The question is how much. He needs a long-term statin so if we introduce this it needs to be at least 6 weeks from now and at low dose. Follow-up Disposition:  Return in about 4 weeks (around 4/8/2019). Plan of care:  Discussed diagnoses in detail with patient. Medication risks/benefits/side effects discussed with patient. All of the patient's questions were addressed. The patient understands and agrees with our plan of care. The patient knows to call back if they are unsure of or forget any changes we discussed today or if the symptoms change. The patient received an After-Visit Summary which contains VS, orders, medication list and allergy list. This can be used as a \"mini-medical record\" should they have to seek medical care while out of town.     Patient Care Team:  Cate Fuchs MD as PCP - Leander Allison MD (Urology)  Hermes Mares MD (Cardiology)  Yvrose Palumbo MD (Ophthalmology)  Emily Phillips, DEBBIE as Ambulatory Care Navigator    Follow-up Disposition:  Return in about 4 weeks (around 4/8/2019). Future Appointments   Date Time Provider Sarina Richards   5/14/2019  8:40 AM Tessa Kc MD [facility] LOYDA JACEK   5/31/2019  3:40 PM Maribel Vera MD [address] [place] [address]       Signed By: Diona Favre, MD     March 11, 2019        This note was created using voice recognition software. Despite editing, there may be syntax errors.

## 2019-03-11 NOTE — PATIENT INSTRUCTIONS

## 2019-03-12 ENCOUNTER — TELEPHONE (OUTPATIENT)
Dept: FAMILY MEDICINE CLINIC | Age: 79
End: 2019-03-12

## 2019-03-12 LAB
ALBUMIN SERPL-MCNC: 3.9 G/DL (ref 3.5–4.8)
ALP SERPL-CCNC: 126 IU/L (ref 39–117)
ALT SERPL-CCNC: 21 IU/L (ref 0–44)
AST SERPL-CCNC: 16 IU/L (ref 0–40)
BILIRUB DIRECT SERPL-MCNC: 0.16 MG/DL (ref 0–0.4)
BILIRUB SERPL-MCNC: 0.4 MG/DL (ref 0–1.2)
PROT SERPL-MCNC: 6.9 G/DL (ref 6–8.5)

## 2019-03-12 NOTE — TELEPHONE ENCOUNTER
Phone call to patient to discuss recent lab results, no answer. Per Dr. Marie Peers: \"His liver function tests are almost back to normal. See me back as planned. \"

## 2019-04-15 RX ORDER — TRANDOLAPRIL 2 MG/1
TABLET ORAL
Qty: 90 TAB | Refills: 3 | Status: SHIPPED | OUTPATIENT
Start: 2019-04-15 | End: 2020-04-09

## 2019-05-05 RX ORDER — SIMVASTATIN 20 MG/1
TABLET, FILM COATED ORAL
Qty: 90 TAB | Refills: 2 | Status: SHIPPED | OUTPATIENT
Start: 2019-05-05 | End: 2019-05-31 | Stop reason: SINTOL

## 2019-05-14 ENCOUNTER — OFFICE VISIT (OUTPATIENT)
Dept: FAMILY MEDICINE CLINIC | Age: 79
End: 2019-05-14

## 2019-05-14 VITALS
BODY MASS INDEX: 28.28 KG/M2 | SYSTOLIC BLOOD PRESSURE: 116 MMHG | HEIGHT: 66 IN | TEMPERATURE: 97.5 F | DIASTOLIC BLOOD PRESSURE: 72 MMHG | RESPIRATION RATE: 22 BRPM | OXYGEN SATURATION: 94 % | HEART RATE: 67 BPM | WEIGHT: 176 LBS

## 2019-05-14 DIAGNOSIS — I50.22 CHRONIC SYSTOLIC CONGESTIVE HEART FAILURE (HCC): ICD-10-CM

## 2019-05-14 DIAGNOSIS — E13.8: Primary | ICD-10-CM

## 2019-05-14 DIAGNOSIS — I49.3 ASYMPTOMATIC PVCS: ICD-10-CM

## 2019-05-14 DIAGNOSIS — E08.3291 MILD NONPROLIFERATIVE DIABETIC RETINOPATHY OF RIGHT EYE WITHOUT MACULAR EDEMA ASSOCIATED WITH DIABETES MELLITUS DUE TO UNDERLYING CONDITION (HCC): Chronic | ICD-10-CM

## 2019-05-14 DIAGNOSIS — E78.00 PURE HYPERCHOLESTEROLEMIA: Chronic | ICD-10-CM

## 2019-05-14 DIAGNOSIS — I25.5 ISCHEMIC CARDIOMYOPATHY: ICD-10-CM

## 2019-05-14 DIAGNOSIS — E03.4 HYPOTHYROIDISM DUE TO ACQUIRED ATROPHY OF THYROID: Chronic | ICD-10-CM

## 2019-05-14 DIAGNOSIS — C61 PROSTATE CANCER (HCC): Chronic | ICD-10-CM

## 2019-05-14 DIAGNOSIS — B27.90 EPSTEIN BARR INFECTION: ICD-10-CM

## 2019-05-14 NOTE — PROGRESS NOTES
[address] 
[phone] Progress Note Patient: Virgie Encarnacion MRN: [id number]  SSN: [id number] YOB: 1940  Age: 69 y.o. Sex: male Chief Complaint Patient presents with  Labs Fasting  Thyroid Problem Subjective:  
 
Encounter Diagnoses Name Primary? 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 Overall the patient feels well with good energy level. Key Antihyperglycemic Medications Patient is on no antihyperglycemic meds. Pertinent Labs:  
Lab Results Component Value Date/Time Hemoglobin A1c 6.1 (H) 02/05/2019 09:05 AM  
 Hemoglobin A1c 5.8 (H) 08/17/2018 01:55 PM  
 Hemoglobin A1c 5.9 (H) 04/16/2018 03:27 PM  
 
 Body mass index is 28.41 kg/m². Lab Results Component Value Date/Time LDL, calculated 64 02/05/2019 09:05 AM  
     
Lab Results Component Value Date/Time Sodium 134 (L) 02/27/2019 11:24 AM  
 Potassium 4.5 02/27/2019 11:24 AM  
 Chloride 98 02/27/2019 11:24 AM  
 CO2 26 02/27/2019 11:24 AM  
 Anion gap 10 02/27/2019 11:24 AM  
 Glucose 157 (H) 02/27/2019 11:24 AM  
 BUN 21 (H) 02/27/2019 11:24 AM  
 Creatinine 1.02 02/27/2019 11:24 AM  
 BUN/Creatinine ratio 21 (H) 02/27/2019 11:24 AM  
 GFR est AA >60 02/27/2019 11:24 AM  
 GFR est non-AA >60 02/27/2019 11:24 AM  
 Calcium 10.5 (H) 02/27/2019 11:24 AM  
 AST (SGOT) 16 03/11/2019 03:51 PM  
 Alk. phosphatase 126 (H) 03/11/2019 03:51 PM  
 Protein, total 6.9 03/11/2019 03:51 PM  
 Albumin 3.9 03/11/2019 03:51 PM  
 Globulin 5.2 (H) 02/27/2019 11:24 AM  
 A-G Ratio 0.5 (L) 02/27/2019 11:24 AM  
 ALT (SGPT) 21 03/11/2019 03:51 PM  
 
Lab Results Component Value Date/Time  Microalbumin/Creat ratio (mg/g creat) 13 12/08/2009 11:22 AM  
 Microalb/Creat ratio (ug/mg creat.) 8.7 02/05/2019 09:05 AM  
 Microalbumin,urine random 1.72 12/08/2009 11:22 AM  
 
 Frequency of home glucose testing: No logs Blood Sugar range at home:  
 Last eye exam: In past 12 months. Last foot exam: This year. Polyuria, polyphagia or polydipsia: No 
 Retinopathy: No 
 Neuropathy SX: No  
 Low blood sugar symptoms: No 
 Dietary compliance: Good Medication compliance:Good On ASA: Yes Depression: No 
 CKD:no Wt Readings from Last 3 Encounters:  
05/14/19 176 lb (79.8 kg) 03/11/19 168 lb 9.6 oz (76.5 kg) 03/01/19 170 lb (77.1 kg) Social History Tobacco Use Smoking Status Never Smoker Smokeless Tobacco Never Used Body mass index is 28.41 kg/m². All the patient's questions regarding medications, diet and exercise were answered. Goal of A1C of less than 7.5% is our goal.  
Our overall goal is to reduce or eliminate the long term consequences of poorly controlled diabetes. Yes  Mild nonproliferative diabetic retinopathy of right eye without macular edema associated with diabetes mellitus due to underlying condition [facility]): See above  Pure hypercholesterolemia: 
Cardiovascular risks for him are: LDL goal is under 80 
existing CAD 
hyperlipidemia. Key Antihyperlipidemia Meds   
    
  
 simvastatin (ZOCOR) 20 mg tablet TAKE 1 TABLET NIGHTLY Lab Results Component Value Date/Time Cholesterol, total 129 02/05/2019 09:05 AM  
 HDL Cholesterol 44 02/05/2019 09:05 AM  
 LDL, calculated 64 02/05/2019 09:05 AM  
 Triglyceride 104 02/05/2019 09:05 AM  
 CHOL/HDL Ratio 3.0 10/06/2010 08:58 AM  
 
Lab Results Component Value Date/Time ALT (SGPT) 21 03/11/2019 03:51 PM  
 AST (SGOT) 16 03/11/2019 03:51 PM  
 Alk.  phosphatase 126 (H) 03/11/2019 03:51 PM  
 Bilirubin, direct 0.16 03/11/2019 03:51 PM  
 Bilirubin, total 0.4 03/11/2019 03:51 PM  
 
 Myalgias: No 
 Fatigue: No 
 Other side effects: no 
 Wt Readings from Last 3 Encounters:  
05/14/19 176 lb (79.8 kg) 03/11/19 168 lb 9.6 oz (76.5 kg) 03/01/19 170 lb (77.1 kg) The patient is aware of our goal to reduce or eliminate the long term problems (such as strokes and heart attacks) related to poorly controlled hyperlipidemia.  Hypothyroidism due to acquired atrophy of thyroid: 
Lab Results Component Value Date/Time TSH 4.480 02/05/2019 09:05 AM  
 T4, Free 1.32 02/05/2019 09:05 AM  
 
 Denies fatigue, nervousness,weight changes, heat orcold intolerance, bowel changes,skin changes, cardiovascular symptoms, hair loss, feeling excessive energy, tremor, palpitations and weight loss. Thyroid medication has been unchanged since last medication check and labs.  Asymptomatic PVCs: 
No symptoms  Prostate cancer [facility]): See last PSA. Under surveillance by urology.  Chronic systolic congestive heart failure ([address]): Stable but he does get tired he easily. No shortness of breath. Feels better.  Ischemic cardiomyopathy: Stable Cornelious Sanes infection: He is still not sure where he was exposed to Reddy-Kessler. He seems to be overall the symptoms related to his protracted illness in February Current and past medical information: 
 
Current Medications after this visit[de-identified]    
Current Outpatient Medications Medication Sig  
 trandolapril (MAVIK) 2 mg tablet TAKE 1 TABLET NIGHTLY FOR LEFT VENTRICULAR DYSFUNCTION FOLLOWING MI  
 carvedilol (COREG) 3.125 mg tablet TAKE 1 TABLET TWICE A DAY WITH MEALS  
 levothyroxine (SYNTHROID) 50 mcg tablet TAKE 1 TABLET DAILY BEFORE BREAKFAST  varicella-zoster [facility]) injection Shingrix, one injection now and repeat in 4-6 months. To be administered at Pharmacy. Fax confirmation to me at [phone].  glucose blood VI test strips (FREESTYLE LITE STRIPS) strip USE TO TEST ONCE DAILY  FREESTYLE LANCETS 28 gauge misc USE TO TEST DAILY  aspirin delayed-release 81 mg tablet Take  by mouth daily.  Cholecalciferol, Vitamin D3, (VITAMIN D3) 1,000 unit cap Take  by mouth daily.  fish oil-dha-epa (FISH OIL) 1,200-144-216 mg Cap Take  by mouth.  VITAMIN B COMPLEX (B COMPLEX PO) Take  by mouth.  PV W-O MIKO/FERROUS FUMARATE/FA (M-VIT PO) Take  by mouth.  simvastatin (ZOCOR) 20 mg tablet TAKE 1 TABLET NIGHTLY No current facility-administered medications for this visit. Patient Active Problem List  
 Diagnosis Date Noted  Chronic systolic congestive heart failure ([facility] 05/16/2018 Priority: 1 - One  
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  
 Ischemic cardiomyopathy 01/06/2015 Priority: 1 - One  Asymptomatic PVCs 08/21/2013 Priority: 1 - One  
 Hypothyroidism 09/05/2012 Priority: 1 - One  Colon polyps 05/31/2010 Priority: 6 - Six  Dyslipidemia 05/16/2018  [facility] spotted fever) Past Medical History:  
Diagnosis Date  Cardiomyopathy, dilated, nonischemic (HCC)  Colon polyps  Hypercholesterolemia  Prostate cancer ([facility] [place] 6, watch and wait strategy  [facility] spotted fever)  T2DM (type 2 diabetes mellitus) ([facility] 8/21/2013 Allergies Allergen Reactions  Pcn [Penicillins] Rash and Swelling  Robitussin [Guaifenesin] Hives Past Surgical History:  
Procedure Laterality Date  CARDIAC SURG PROCEDURE UNLIST  HX HERNIA REPAIR    
 left inguinal x 2 Social History Socioeconomic History  Marital status:  Spouse name: Not on file  Number of children: Not on file  Years of education: Not on file  Highest education level: Not on file Tobacco Use  Smoking status: Never Smoker  Smokeless tobacco: Never Used Substance and Sexual Activity  Alcohol use: No  
 Drug use: No  
 
 
Review of Systems Constitutional: Negative. Negative for chills, fever, malaise/fatigue and weight loss. HENT: Negative. Negative for hearing loss. Eyes: Negative. Negative for blurred vision and double vision. Respiratory: Negative. Negative for cough, hemoptysis and sputum production. Cardiovascular: Negative. Negative for chest pain, palpitations, orthopnea and leg swelling. Gastrointestinal: Negative. Negative for abdominal pain, blood in stool, heartburn, nausea and vomiting. Genitourinary: Negative. Negative for dysuria, frequency and urgency. Musculoskeletal: Negative. Negative for back pain, myalgias and neck pain. Muscle joint aches have gotten better. He does not want to go back on his cholesterol medicine until after he sees Dr. Tho Kruger Skin: Negative. Negative for rash. Neurological: Negative. Negative for dizziness, tingling, tremors, weakness and headaches. Endo/Heme/Allergies: Negative. Psychiatric/Behavioral: Negative. Negative for depression. Objective:  
 
Vitals:  
 05/14/19 5550 BP: 116/72 Pulse: 67 Resp: 22 Temp: 97.5 °F (36.4 °C) TempSrc: Oral  
SpO2: 94% Weight: 176 lb (79.8 kg) Height: 5' 6\" (1.676 m) Body mass index is 28.41 kg/m². Physical Exam  
Constitutional: He is oriented to person, place, and time and well-developed, well-nourished, and in no distress. HENT:  
Head: Normocephalic and atraumatic. Mouth/Throat: Oropharynx is clear and moist.  
Eyes: Right eye exhibits no discharge. Left eye exhibits no discharge. No scleral icterus. Neck: No thyromegaly present. No bruit. Cardiovascular: Normal rate, regular rhythm and normal heart sounds. Pulmonary/Chest: Effort normal and breath sounds normal. No respiratory distress. He has no wheezes. He has no rales. Abdominal: Soft. He exhibits no distension. There is no tenderness. There is no rebound and no guarding. Neurological: He is alert and oriented to person, place, and time. Skin: No rash noted. No erythema. Psychiatric: Mood and affect normal.  
Nursing note and vitals reviewed. There are no preventive care reminders to display for this patient. Assessment and orders:  
 
Encounter Diagnoses ICD-10-CM ICD-9-CM 1. 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  
2. Mild nonproliferative diabetic retinopathy of right eye without macular edema associated with diabetes mellitus due to underlying condition ([facility] Z93.1930 249.50  
  362.04  
3. Pure hypercholesterolemia E78.00 272.0  
4. Hypothyroidism due to acquired atrophy of thyroid E03.4 244.8  
  246.8 5. Asymptomatic PVCs I49.3 427.69 6. Prostate cancer ([facility] C61 185 7. Chronic systolic congestive heart failure (HCC) I50.22 428.22  
  428.0 8. Ischemic cardiomyopathy I25.5 414.8 9. Chales Gauss infection B27.90 [id number] Diagnoses and all orders for this visit: 1. 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  A1c's are usually normal. 
-     HEMOGLOBIN A1C WITH EAG 
-     LIPID PANEL 
-     METABOLIC PANEL, COMPREHENSIVE 
-     T4, FREE 2. Mild nonproliferative diabetic retinopathy of right eye without macular edema associated with diabetes mellitus due to underlying condition ([facility])-no change in eyesight. 
-     HEMOGLOBIN A1C WITH EAG 
-     LIPID PANEL 
-     METABOLIC PANEL, COMPREHENSIVE 
-     T4, FREE 3. Pure hypercholesterolemia-retest.  Off medication.   He does not want to restart the cholesterol medicine until he sees Dr. Papi Cruz.   
-     Ayde Ireland 
 4. Hypothyroidism due to acquired atrophy of thyroid-retest 
-     T4, FREE 5. Asymptomatic PVCs-unaware of irregular heartbeats 6. Prostate cancer (HCC)-no change in surveillance 7. Chronic systolic congestive heart failure (HCC)-no symptoms 8. Ischemic cardiomyopathy-stable 9. Lucia Girt infection-resolved Plan of care: 
Discussed diagnoses in detail with patient. Medication risks/benefits/side effects discussed with patient. All of the patient's questions were addressed. The patient understands and agrees with our plan of care. The patient knows to call back if they are unsure of or forget any changes we discussed today or if the symptoms change. The patient received an After-Visit Summary which contains VS, orders, medication list and allergy list. This can be used as a \"mini-medical record\" should they have to seek medical care while out of town. Patient Care Team: 
Gregorio Jordan MD as PCP - Madeline Serra MD (Urology) Van Lewis MD (Cardiology) Abelardo Carter MD (Ophthalmology) Alex Ragland RN as Ambulatory Care Navigator Follow-up and Dispositions · Return in about 3 months (around 8/14/2019). Future Appointments Date Time Provider [facility] 5/31/2019  3:40 PM Van Lewis MD [address] [place] [address]  
8/14/2019  9:00 AM Conrad Fletcher MD [facility] Signed By: Silviano Jaimes MD   
 May 14, 2019 ATTENTION:  
This medical record was transcribed using an electronic medical records/speech recognition system. Although proofread, it may and can contain electronic, spelling and other errors. Corrections may be executed at a later time. Please feel free to contact me for any clarifications as needed.

## 2019-05-14 NOTE — PROGRESS NOTES
Body mass index is 28.41 kg/m². Chief Complaint Patient presents with  Labs Fasting  Thyroid Problem 1. Have you been to the ER, urgent care clinic since your last visit? Hospitalized since your last visit? Yes, [facility] 2. Have you seen or consulted any other health care providers outside of the [address] [place] [address] since your last visit? Include any pap smears or colon screening. No 
 
Reviewed record in preparation for visit and have necessary documentation Pt did not bring medication to office visit for review Information was given to pt on Advanced Directives, Living Will Information was given on Shingles Vaccine Opportunity was given for questions Goals that were addressed and/or need to be completed after this appointment include:  
 
Health Maintenance Due Topic Date Due  Shingrix Vaccine Age 50> (1 of 2) 08/21/1991  Pneumococcal 65+ years (2 of 2 - PPSV23) 08/13/2016

## 2019-05-14 NOTE — PATIENT INSTRUCTIONS

## 2019-05-15 LAB
ALBUMIN SERPL-MCNC: 4.6 G/DL (ref 3.5–4.8)
ALBUMIN/GLOB SERPL: 1.7 {RATIO} (ref 1.2–2.2)
ALP SERPL-CCNC: 61 IU/L (ref 39–117)
ALT SERPL-CCNC: 16 IU/L (ref 0–44)
AST SERPL-CCNC: 16 IU/L (ref 0–40)
BILIRUB SERPL-MCNC: 0.4 MG/DL (ref 0–1.2)
BUN SERPL-MCNC: 12 MG/DL (ref 8–27)
BUN/CREAT SERPL: 13 (ref 10–24)
CALCIUM SERPL-MCNC: 9.9 MG/DL (ref 8.6–10.2)
CHLORIDE SERPL-SCNC: 100 MMOL/L (ref 96–106)
CHOLEST SERPL-MCNC: 210 MG/DL (ref 100–199)
CO2 SERPL-SCNC: 23 MMOL/L (ref 20–29)
CREAT SERPL-MCNC: 0.92 MG/DL (ref 0.76–1.27)
EST. AVERAGE GLUCOSE BLD GHB EST-MCNC: 117 MG/DL
GLOBULIN SER CALC-MCNC: 2.7 G/DL (ref 1.5–4.5)
GLUCOSE SERPL-MCNC: 117 MG/DL (ref 65–99)
HBA1C MFR BLD: 5.7 % (ref 4.8–5.6)
HDLC SERPL-MCNC: 46 MG/DL
LDLC SERPL CALC-MCNC: 141 MG/DL (ref 0–99)
POTASSIUM SERPL-SCNC: 4.6 MMOL/L (ref 3.5–5.2)
PROT SERPL-MCNC: 7.3 G/DL (ref 6–8.5)
SODIUM SERPL-SCNC: 138 MMOL/L (ref 134–144)
T4 FREE SERPL-MCNC: 1.16 NG/DL (ref 0.82–1.77)
TRIGL SERPL-MCNC: 116 MG/DL (ref 0–149)
VLDLC SERPL CALC-MCNC: 23 MG/DL (ref 5–40)

## 2019-05-31 ENCOUNTER — OFFICE VISIT (OUTPATIENT)
Dept: CARDIOLOGY CLINIC | Age: 79
End: 2019-05-31

## 2019-05-31 VITALS
HEIGHT: 66 IN | SYSTOLIC BLOOD PRESSURE: 110 MMHG | DIASTOLIC BLOOD PRESSURE: 70 MMHG | HEART RATE: 74 BPM | OXYGEN SATURATION: 96 % | BODY MASS INDEX: 28.42 KG/M2 | WEIGHT: 176.8 LBS

## 2019-05-31 DIAGNOSIS — Z78.9 STATIN INTOLERANCE: ICD-10-CM

## 2019-05-31 DIAGNOSIS — I25.5 ISCHEMIC CARDIOMYOPATHY: ICD-10-CM

## 2019-05-31 DIAGNOSIS — I49.3 ASYMPTOMATIC PVCS: ICD-10-CM

## 2019-05-31 DIAGNOSIS — E78.5 DYSLIPIDEMIA: ICD-10-CM

## 2019-05-31 DIAGNOSIS — I50.22 CHRONIC SYSTOLIC CONGESTIVE HEART FAILURE (HCC): Primary | ICD-10-CM

## 2019-05-31 RX ORDER — ROSUVASTATIN CALCIUM 10 MG/1
10 TABLET, COATED ORAL DAILY
Qty: 30 TAB | Refills: 5 | Status: SHIPPED | OUTPATIENT
Start: 2019-05-31 | End: 2020-02-13 | Stop reason: SDUPTHER

## 2019-05-31 NOTE — PROGRESS NOTES
[address], [address]  [address],  [address]  [place], [address] [facility]    Office [phone]  Fax [phone]  Cell [phone]        Guanakito Barth is a 69 y.o. male Last seen by me 6 months ago. Assessment  Encounter Diagnoses   Name Primary?  Ischemic cardiomyopathy     Asymptomatic PVCs     Statin intolerance     Chronic systolic congestive heart failure (HCC) Yes    Dyslipidemia        Recommendations:  1. Ischemic cardiomyopathy without obstructive CAD. Cardiac MRI 2014 showed evidence of RCA territory infarction without viability. Updated echo 1 year ago demonstrated EF 35-40%. He has stage B HF at a good functional capacity. He is on good medical therapy, optimal dosing limited by bradycardia and lowish BP. Continue current doses of Carvedilol and Trandolapril. Reassess LV function with echo in 6 months. 2. Diffuse arthralgias, resolved after stopping simvastatin. Will try Crestor 10mg/d plus CoQ10 100mg BID. If he develops side effects, then we discussed the role of PCSK9i.    3. T2DM, managed with diet alone, Recent A1c 5.7%. Follow-up and Dispositions    · Return in about 6 months (around 11/30/2019) for follow-up with same day echo. Subjective:  Mr. Isabel Day reports that he has been doing well. He reports diffuse joint locking about 2 months ago, requiring ED evaluation (twice), that resolved 5 days after stopping simvastatin. He notes he had myalgias with a statin in the past but cannot recall which one. He denies any SOB, ALLEN, or CP. He reports his energy level has been good. 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 He enjoys this and it keeps him active. Otherwise, patient denies any exertional chest pain, dyspnea, palpitations, syncope, orthopnea, edema or paroxysmal nocturnal dyspnea. He is adherent with his medications.        Cardiac testing  Echo 9/2013 - global HK, EF 30-35%  Stress echo 9/2013- poor exercise capacity, resting global HK, EF 30%, no enhancement of EF with exercise  Cath 9/24/2013 - EDP 16, no AV gradient, LV dilated, EF 25-30% global, LM large nl, LAD mild plaque, LCX nl, RCA normal.    Echo 5/15/2014 - EF 30-35%, global HK    Cardiac MRI 6/2014 - Dense medium size subendocardial infarct of the basal and mid   inferolateral and lateral walls (primarily mid segments) with myocardial   wall thinning and fatty metaplasia of the infarct suggest chronic non   reperfusing infarct. No significant viability in these segments. All other   segments are completely viable. LVEF 42%. Echo 1/5/15 - EF 30-35%. inferior and inferolateral akinesis, mild MR    Echo 5/24/18 - EF 35-40%. Mod HK of basal inferior walls. Mild LVH. Mild TR. AoV sclerosis without stenosis. Aortic root mild dilatation; ascending aorta 3.9cm. Improvement in LF function when compared to study 1/2015. Past Medical History:   Diagnosis Date    Cardiomyopathy, dilated, nonischemic ([facility]     Colon polyps     Hypercholesterolemia     Prostate cancer ([facility]     [place]iel[place] 6, watch and wait strategy    [facility] spotted fever)     T2DM (type 2 diabetes mellitus) ([facility] 8/21/2013        Current Outpatient Medications   Medication Sig Dispense Refill    trandolapril (MAVIK) 2 mg tablet TAKE 1 TABLET NIGHTLY FOR LEFT VENTRICULAR DYSFUNCTION FOLLOWING MI 90 Tab 3    carvedilol (COREG) 3.125 mg tablet TAKE 1 TABLET TWICE A DAY WITH MEALS 180 Tab 3    levothyroxine (SYNTHROID) 50 mcg tablet TAKE 1 TABLET DAILY BEFORE BREAKFAST 90 Tab 3    varicella-zoster (SHINGRIX) injection Shingrix, one injection now and repeat in 4-6 months. To be administered at Pharmacy.  Fax confirmation to me at [phone]. 2 Each 0    glucose blood VI test strips (FREESTYLE LITE STRIPS) strip USE TO TEST ONCE DAILY 100 Strip 5    FREESTYLE LANCETS 28 gauge misc USE TO TEST DAILY 100 Lancet 4    aspirin delayed-release 81 mg tablet Take  by mouth daily.  Cholecalciferol, Vitamin D3, (VITAMIN D3) 1,000 unit cap Take  by mouth daily.  fish oil-dha-epa (FISH OIL) 1,200-144-216 mg Cap Take  by mouth.  VITAMIN B COMPLEX (B COMPLEX PO) Take  by mouth.  PV W-O MIKO/FERROUS FUMARATE/FA (M-VIT PO) Take  by mouth.  simvastatin (ZOCOR) 20 mg tablet TAKE 1 TABLET NIGHTLY 90 Tab 2       Allergies   Allergen Reactions    Pcn [Penicillins] Rash and Swelling    Robitussin [Guaifenesin] Hives      Retired. Review of Systems  Constitutional: Negative for fever, chills, malaise/fatigue and diaphoresis. Respiratory: Negative for cough, hemoptysis, sputum production, shortness of breath and wheezing. Cardiovascular: Negative for chest pain, palpitations, orthopnea, claudication, leg swelling and PND. Gastrointestinal: Negative for heartburn, nausea, vomiting, blood in stool and melena. Genitourinary: Negative for dysuria and flank pain. Musculoskeletal: Negative for joint pain and back pain. Skin: Negative for rash. Neurological: Negative for focal weakness, seizures, loss of consciousness, weakness and headaches. Endo/Heme/Allergies: Does not bruise/bleed easily. Psychiatric/Behavioral: Negative for memory loss. The patient does not have insomnia. Physical Exam    Visit Vitals  /70 (BP 1 Location: Right arm, BP Patient Position: Sitting)   Pulse 74   Ht 5' 6\" (1.676 m)   Wt 176 lb 12.8 oz (80.2 kg)   SpO2 96%   BMI 28.54 kg/m²     Wt Readings from Last 3 Encounters:   05/31/19 176 lb 12.8 oz (80.2 kg)   05/14/19 176 lb (79.8 kg)   03/11/19 168 lb 9.6 oz (76.5 kg)      General - well developed well nourished  Neck - JVP normal, thyroid nl  Cardiac - normal S1, S2, no murmurs, rubs or gallops.  No clicks  Vascular - carotids without bruits, radials, femorals and pedal pulses equal bilateral  Lungs - clear to auscultation bilaterals, no rales, wheezing or rhonchi  Abd - soft nontender, no HSM, no abd bruits  Extremities - no edema  Skin - no rash  Neuro - nonfocal  Psych - normal mood and affect    Cardiographics  EKG 11/2014 - Sinus rhythm with PVCs, mild IVCD  Echo 1/5/15 - EF 30-35%. inferior and inferolateral akinesis, mild MR  EKG 4/16/18- SR, 1st degree AV block , LAHB, IVCD  EKG 5/31/19: SR, , LAHB      Medical documentation entered into the chart by Cecilia Up, medical scribe for Cecilia Up on 5/31/2019.      Saba Dove MD

## 2019-05-31 NOTE — PROGRESS NOTES
Patient has no cardiac complaints today     Visit Vitals  /70 (BP 1 Location: Right arm, BP Patient Position: Sitting)   Pulse 74   Ht 5' 6\" (1.676 m)   Wt 176 lb 12.8 oz (80.2 kg)   SpO2 96%   BMI 28.54 kg/m²

## 2019-06-11 ENCOUNTER — OFFICE VISIT (OUTPATIENT)
Dept: FAMILY MEDICINE CLINIC | Age: 79
End: 2019-06-11

## 2019-06-11 VITALS
OXYGEN SATURATION: 96 % | TEMPERATURE: 98.4 F | HEIGHT: 66 IN | SYSTOLIC BLOOD PRESSURE: 115 MMHG | HEART RATE: 41 BPM | BODY MASS INDEX: 28.7 KG/M2 | DIASTOLIC BLOOD PRESSURE: 62 MMHG | RESPIRATION RATE: 18 BRPM | WEIGHT: 178.6 LBS

## 2019-06-11 DIAGNOSIS — J06.9 VIRAL UPPER RESPIRATORY TRACT INFECTION: Primary | ICD-10-CM

## 2019-06-11 RX ORDER — SIMVASTATIN 20 MG/1
TABLET, FILM COATED ORAL
COMMUNITY
Start: 2019-05-05 | End: 2019-06-11 | Stop reason: CLARIF

## 2019-06-11 NOTE — PATIENT INSTRUCTIONS
June 11, 2019  [address]  [id number]      Dear Brit Found: Thank you for requesting access to [facility]. Please follow the instructions below to view your test results, access and download parts of your medical record, view details of your past and upcoming appointments, and view your medications online. How Do I Sign Up? 1. In your internet browser, go to [email or web address]  2. Click on the First Time User? Click Here link in the Sign In box. You will see the New Member Sign Up page. 3. Enter your [facility] Access Code exactly as it appears below. You will not need to use this code after youve completed the sign-up process. If you do not sign up before the expiration date, you must request a new code. · [facility] Access Code: [email or web address]  · Expires: 6/28/2019  8:54 AM    4. Enter the last four digits of your Social Security Number (xxxx) and Date of Birth (mm/dd/yyyy) as indicated and click Submit. You will be taken to the next sign-up page. 5. Create a [facility] ID. This will be your [facility] login ID and cannot be changed, so think of one that is secure and easy to remember. 6. Create a [facility] password. You can change your password at any time. 7. Enter your Password Reset Question and Answer. This can be used at a later time if you forget your password. 8. Enter your e-mail address. You will receive e-mail notification when new information is available in [address] 9. Click Sign Up. You can now view and download portions of your medical record. 10. Click the Download Summary menu link to download a portable copy of your medical information. Additional Information:              If you require any assistance or have any questions, please contact our [address] at [phone], email at [email or web address] or check online in our Frequently Asked Questions.     Remember, [facility] is NOT to be used for urgent needs. For medical emergencies, dial 911. Now available from your iPhone and Android!     Sincerely,   Edinson Ramirez

## 2019-06-16 NOTE — PROGRESS NOTES
Patient: Mark Geller MRN: [id number]  SSN: [id number]    YOB: 1940  Age: 69 y.o. Sex: male      Chief Complaint   Patient presents with    Cough     with some congestion, started Sunday     Mark Geller is a 69 y.o. male presents with complaints of congestion and dry cough for 3 days. There has been no nausea and no vomiting . he has not had  sore throat, night sweats, myalgias, headache and fever. Symptoms are mild. Patient is drinking plenty of fluids. There is not a hx of asthma. There is not a hx of allergic rhinitis. There is a hx of tobacco use. There have not been contacts with similar infections. Medications:     Current Outpatient Medications   Medication Sig    rosuvastatin (CRESTOR) 10 mg tablet Take 1 Tab by mouth daily.  trandolapril (MAVIK) 2 mg tablet TAKE 1 TABLET NIGHTLY FOR LEFT VENTRICULAR DYSFUNCTION FOLLOWING MI    carvedilol (COREG) 3.125 mg tablet TAKE 1 TABLET TWICE A DAY WITH MEALS    levothyroxine (SYNTHROID) 50 mcg tablet TAKE 1 TABLET DAILY BEFORE BREAKFAST    varicella-zoster (SHINGRIX) injection Shingrix, one injection now and repeat in 4-6 months. To be administered at Pharmacy. Fax confirmation to me at [phone].  glucose blood VI test strips (FREESTYLE LITE STRIPS) strip USE TO TEST ONCE DAILY    FREESTYLE LANCETS 28 gauge misc USE TO TEST DAILY    aspirin delayed-release 81 mg tablet Take  by mouth daily.  Cholecalciferol, Vitamin D3, (VITAMIN D3) 1,000 unit cap Take  by mouth daily.  fish oil-dha-epa (FISH OIL) 1,200-144-216 mg Cap Take  by mouth.  VITAMIN B COMPLEX (B COMPLEX PO) Take  by mouth.  PV W-O MIKO/FERROUS FUMARATE/FA (M-VIT PO) Take  by mouth. No current facility-administered medications for this visit.         Problem List:     Patient Active Problem List    Diagnosis Date Noted    Statin intolerance 05/31/2019    Dyslipidemia 05/16/2018    Chronic systolic congestive heart failure ([facility] 05/16/2018    Controlled maturity onset diabetes mellitus in young (ROMINA) type 2 with complication ([facility] 46/75/2155    Mild 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 edema associated with diabetes mellitus due to underlying condition ([facility] 12/15/2015    Prostate cancer ([facility] 12/15/2015    Ischemic cardiomyopathy 01/06/2015    [facility] spotted fever)     Asymptomatic PVCs 08/21/2013    Hypothyroidism 09/05/2012    Colon polyps 05/31/2010       Medical History:     Past Medical History:   Diagnosis Date    Cardiomyopathy, dilated, nonischemic ([facility]     Colon polyps     Hypercholesterolemia     Prostate cancer ([facility]     Cielo 6, watch and wait strategy    [facility] spotted fever)     T2DM (type 2 diabetes mellitus) ([facility] 8/21/2013       Allergies:      Allergies   Allergen Reactions    Pcn [Penicillins] Rash and Swelling    Robitussin [Guaifenesin] Hives       Surgical History:     Past Surgical History:   Procedure Laterality Date    CARDIAC SURG PROCEDURE UNLIST      HX HERNIA REPAIR      left inguinal x 2       Social History:     Social History     Socioeconomic History    Marital status:      Spouse name: Not on file    Number of children: Not on file    Years of education: Not on file    Highest education level: Not on file   Tobacco Use    Smoking status: Never Smoker    Smokeless tobacco: Never Used   Substance and Sexual Activity    Alcohol use: No    Drug use: No       Review of Symptoms:  Constitutional: Negative for malaise, fever or chills  Skin: Negative for rash or lesion  Head: Negative for facial swelling or tenderness  Eyes: Negative for redness or discharge  Ears: Negative for otalgia or decreased hearing  Nose: c/o nasal congestion, denies sinus pressure  Neck: Negative for sore throat, lymphadenopathy   Cardiovascular: Negative for chest pain or palpitations  Respiratory: c/o non-productive cough, denies wheezing or SOB  Gastrointestinal: Negative for nausea or abdominal pain  Neurologic: Negative for headache or dizziness      Visit Vitals  /62 (BP 1 Location: Left arm, BP Patient Position: Sitting)   Pulse (!) 41   Temp 98.4 °F (36.9 °C) (Oral)   Resp 18   Ht 5' 6\" (1.676 m)   Wt 178 lb 9.6 oz (81 kg)   SpO2 96%   BMI 28.83 kg/m²       Physical Examination:  General: Well developed, well nourished, in no acute distress  Skin: Warm and dry sans rash or lesion  Head: Normocephalic, atraumatic  Eyes: Sclera clear, EOMI, PERRL  Nose: mucosal edema with rhinorrhea  Oropharynx: posterior erythema, no exudate   Neck: Normal range of motion, no lymphadenopathy  Cardiovascular: normal S1, S2, regular rate and rhythm  Respiratory: Clear to auscultation bilaterally with symmetrical, unlabored effort  Extremities: Full range of motion  Neurologic: Active, alert and oriented      Diagnoses and all orders for this visit:    1. Viral upper respiratory tract infection        Symptomatic therapy suggested: rest, increase fluids and call prn if symptoms persist or worsen. I have discussed the diagnosis with the patient and the intended plan as seen in the above orders. The patient expresses understanding and agreement with our plan of care. All of the patient's questions were answered to apparent satisfaction. The patient has received an after-visit summary. The patient knows to call our office if there are any questions or concerns regarding diagnosis and treatment plans. I have discussed medication side effects and warnings with the patient as well. Follow-up and Dispositions    · Return if symptoms worsen or fail to improve.

## 2019-08-14 ENCOUNTER — OFFICE VISIT (OUTPATIENT)
Dept: FAMILY MEDICINE CLINIC | Age: 79
End: 2019-08-14

## 2019-08-14 VITALS
BODY MASS INDEX: 28.87 KG/M2 | RESPIRATION RATE: 20 BRPM | HEART RATE: 55 BPM | HEIGHT: 66 IN | OXYGEN SATURATION: 94 % | TEMPERATURE: 98 F | DIASTOLIC BLOOD PRESSURE: 73 MMHG | WEIGHT: 179.6 LBS | SYSTOLIC BLOOD PRESSURE: 122 MMHG

## 2019-08-14 DIAGNOSIS — E03.4 HYPOTHYROIDISM DUE TO ACQUIRED ATROPHY OF THYROID: Chronic | ICD-10-CM

## 2019-08-14 DIAGNOSIS — E78.5 DYSLIPIDEMIA: ICD-10-CM

## 2019-08-14 DIAGNOSIS — E08.3291 MILD NONPROLIFERATIVE DIABETIC RETINOPATHY OF RIGHT EYE WITHOUT MACULAR EDEMA ASSOCIATED WITH DIABETES MELLITUS DUE TO UNDERLYING CONDITION (HCC): Chronic | ICD-10-CM

## 2019-08-14 DIAGNOSIS — Z78.9 STATIN INTOLERANCE: ICD-10-CM

## 2019-08-14 DIAGNOSIS — E13.8: Primary | ICD-10-CM

## 2019-08-14 DIAGNOSIS — I50.22 CHRONIC SYSTOLIC CONGESTIVE HEART FAILURE (HCC): ICD-10-CM

## 2019-08-14 DIAGNOSIS — C61 PROSTATE CANCER (HCC): Chronic | ICD-10-CM

## 2019-08-14 DIAGNOSIS — E78.00 PURE HYPERCHOLESTEROLEMIA: Chronic | ICD-10-CM

## 2019-08-14 NOTE — PROGRESS NOTES
[address] [place] [address], [address] [facility]  [phone]           Progress Note    Patient: Breanna Cadena MRN: [id number]  SSN: [id number]    YOB: 1940  Age: 69 y.o. Sex: male        Chief Complaint   Patient presents with    Labs    Diabetes         Subjective:     Encounter Diagnoses   Name Primary? 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 Overall the patient feels well with good energy level. Key Antihyperglycemic Medications     Patient is on no antihyperglycemic meds. Pertinent Labs:   Lab Results   Component Value Date/Time    Hemoglobin A1c 5.7 (H) 05/14/2019 09:53 AM    Hemoglobin A1c 6.1 (H) 02/05/2019 09:05 AM    Hemoglobin A1c 5.8 (H) 08/17/2018 01:55 PM      Body mass index is 28.99 kg/m². Lab Results   Component Value Date/Time    LDL, calculated 141 (H) 05/14/2019 09:53 AM         Lab Results   Component Value Date/Time    Sodium 138 05/14/2019 09:53 AM    Potassium 4.6 05/14/2019 09:53 AM    Chloride 100 05/14/2019 09:53 AM    CO2 23 05/14/2019 09:53 AM    Anion gap 10 02/27/2019 11:24 AM    Glucose 117 (H) 05/14/2019 09:53 AM    BUN 12 05/14/2019 09:53 AM    Creatinine 0.92 05/14/2019 09:53 AM    BUN/Creatinine ratio 13 05/14/2019 09:53 AM    GFR est AA 92 05/14/2019 09:53 AM    GFR est non-AA 80 05/14/2019 09:53 AM    Calcium 9.9 05/14/2019 09:53 AM    AST (SGOT) 16 05/14/2019 09:53 AM    Alk.  phosphatase 61 05/14/2019 09:53 AM    Protein, total 7.3 05/14/2019 09:53 AM    Albumin 4.6 05/14/2019 09:53 AM    Globulin 5.2 (H) 02/27/2019 11:24 AM    A-G Ratio 1.7 05/14/2019 09:53 AM    ALT (SGPT) 16 05/14/2019 09:53 AM     Lab Results   Component Value Date/Time    Microalbumin/Creat ratio (mg/g creat) 13 12/08/2009 11:22 AM    Microalb/Creat ratio (ug/mg creat.) 8.7 02/05/2019 09:05 AM Microalbumin,urine random 1.72 12/08/2009 11:22 AM      Frequency of home glucose testing: Showed a week   Blood Sugar range at home: All less than 150   Last eye exam: In past 12 months. Last foot exam: This year. Polyuria, polyphagia or polydipsia: No   Retinopathy: No   Neuropathy SX: No    Low blood sugar symptoms: No   Dietary compliance: Good   Medication compliance:Good   On ASA: Yes   Depression: No   CKD:no     Wt Readings from Last 3 Encounters:   08/14/19 179 lb 9.6 oz (81.5 kg)   06/11/19 178 lb 9.6 oz (81 kg)   05/31/19 176 lb 12.8 oz (80.2 kg)        Social History     Tobacco Use   Smoking Status Never Smoker   Smokeless Tobacco Never Used     Body mass index is 28.99 kg/m². All the patient's questions regarding medications, diet and exercise were answered. Goal of A1C of less than 7.5% is our goal.   Our overall goal is to reduce or eliminate the long term consequences of poorly controlled diabetes. 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  Prostate cancer [facility]): He is seeing Dr. Shira Castillo on a regular basis. Watchful waiting. Reportedly stable PSA.  Chronic systolic congestive heart failure ([address]): No shortness of breath. 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  Hypothyroidism due to acquired atrophy of thyroid:  Lab Results   Component Value Date/Time    TSH 4.480 02/05/2019 09:05 AM    T4, Free 1.16 05/14/2019 09:53 AM      Denies fatigue, nervousness,weight changes, heat orcold intolerance, bowel changes,skin changes, cardiovascular symptoms, hair loss, feeling excessive energy, tremor, palpitations and weight loss. Thyroid medication has been unchanged since last medication check and labs.  Pure hypercholesterolemia:  Cardiovascular risks for him are: LDL goal is under 80  diabetic  hyperlipidemia.    Key Antihyperlipidemia Meds rosuvastatin (CRESTOR) 10 mg tablet (Taking) Take 1 Tab by mouth daily. Lab Results   Component Value Date/Time    Cholesterol, total 210 (H) 05/14/2019 09:53 AM    HDL Cholesterol 46 05/14/2019 09:53 AM    LDL, calculated 141 (H) 05/14/2019 09:53 AM    Triglyceride 116 05/14/2019 09:53 AM    CHOL/HDL Ratio 3.0 10/06/2010 08:58 AM     Lab Results   Component Value Date/Time    ALT (SGPT) 16 05/14/2019 09:53 AM    AST (SGOT) 16 05/14/2019 09:53 AM    Alk. phosphatase 61 05/14/2019 09:53 AM    Bilirubin, direct 0.16 03/11/2019 03:51 PM    Bilirubin, total 0.4 05/14/2019 09:53 AM      Myalgias: No   Fatigue: No   Other side effects: no  Wt Readings from Last 3 Encounters:   08/14/19 179 lb 9.6 oz (81.5 kg)   06/11/19 178 lb 9.6 oz (81 kg)   05/31/19 176 lb 12.8 oz (80.2 kg)     The patient is aware of our goal to reduce or eliminate the long term problems (such as strokes and heart attacks) related to poorly controlled hyperlipidemia.  Dyslipidemia:       Statin intolerance              Current and past medical information:    Current Medications after this visit[de-identified]     Current Outpatient Medications   Medication Sig    rosuvastatin (CRESTOR) 10 mg tablet Take 1 Tab by mouth daily.  trandolapril (MAVIK) 2 mg tablet TAKE 1 TABLET NIGHTLY FOR LEFT VENTRICULAR DYSFUNCTION FOLLOWING MI    carvedilol (COREG) 3.125 mg tablet TAKE 1 TABLET TWICE A DAY WITH MEALS    levothyroxine (SYNTHROID) 50 mcg tablet TAKE 1 TABLET DAILY BEFORE BREAKFAST    glucose blood VI test strips (FREESTYLE LITE STRIPS) strip USE TO TEST ONCE DAILY    FREESTYLE LANCETS 28 gauge misc USE TO TEST DAILY    aspirin delayed-release 81 mg tablet Take  by mouth daily.  Cholecalciferol, Vitamin D3, (VITAMIN D3) 1,000 unit cap Take  by mouth daily.  fish oil-dha-epa (FISH OIL) 1,200-144-216 mg Cap Take  by mouth.  VITAMIN B COMPLEX (B COMPLEX PO) Take  by mouth.  PV W-O MIKO/FERROUS FUMARATE/FA (M-VIT PO) Take  by mouth.     varicella-zoster [facility]) injection Shingrix, one injection now and repeat in 4-6 months. To be administered at Pharmacy. Fax confirmation to me at [phone]. No current facility-administered medications for this visit.         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 2 with complication ([facility] 47/58/0172     Priority: 1 - One    Mild 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 edema associated with diabetes mellitus due to underlying condition ([facility] 12/15/2015     Priority: 1 - One    Prostate cancer ([facility] 12/15/2015     Priority: 1 - One    Ischemic cardiomyopathy 01/06/2015     Priority: 1 - One    Asymptomatic PVCs 08/21/2013     Priority: 1 - One    Hypothyroidism 09/05/2012     Priority: 1 - One    Colon polyps 05/31/2010     Priority: 6 - Six    Statin intolerance 05/31/2019    Dyslipidemia 05/16/2018    [facility] spotted fever)        Past Medical History:   Diagnosis Date    Cardiomyopathy, dilated, nonischemic ([facility]     Colon polyps     Hypercholesterolemia     Prostate cancer ([facility]     Cielo 6, watch and wait strategy    [facility] ([facility] spotted fever)     T2DM (type 2 diabetes mellitus) ([facility] 8/21/2013       Allergies   Allergen Reactions    Pcn [Penicillins] Rash and Swelling    Robitussin [Guaifenesin] Hives       Past Surgical History:   Procedure Laterality Date    CARDIAC SURG PROCEDURE UNLIST      HX HERNIA REPAIR      left inguinal x 2       Social History     Socioeconomic History    Marital status:      Spouse name: Not on file    Number of children: Not on file    Years of education: Not on file    Highest education level: Not on file   Tobacco Use    Smoking status: Never Smoker    Smokeless tobacco: Never Used   Substance and Sexual Activity    Alcohol use: No    Drug use: No       Review of Systems   Constitutional: Negative. Negative for chills, fever, malaise/fatigue and weight loss. HENT: Negative. Negative for hearing loss. Eyes: Negative. Negative for blurred vision and double vision. Respiratory: Negative. Negative for cough, sputum production and shortness of breath. Cardiovascular: Negative. Negative for chest pain and palpitations. Gastrointestinal: Negative. Negative for abdominal pain, blood in stool, heartburn, nausea and vomiting. Genitourinary: Negative. Negative for dysuria, frequency and urgency. Musculoskeletal: Negative. Negative for back pain, falls, myalgias and neck pain. Skin: Negative. Negative for rash. Neurological: Negative. Negative for dizziness, tingling, tremors, weakness and headaches. Endo/Heme/Allergies: Negative. Psychiatric/Behavioral: Negative. Negative for depression. Objective:     Vitals:    08/14/19 0855   BP: 122/73   Pulse: (!) 55   Resp: 20   Temp: 98 °F (36.7 °C)   TempSrc: Oral   SpO2: 94%   Weight: 179 lb 9.6 oz (81.5 kg)   Height: 5' 6\" (1.676 m)      Body mass index is 28.99 kg/m². Physical Exam   Constitutional: He is oriented to person, place, and time and well-developed, well-nourished, and in no distress. HENT:   Head: Normocephalic and atraumatic. Mouth/Throat: Oropharynx is clear and moist.   Eyes: Right eye exhibits no discharge. Left eye exhibits no discharge. No scleral icterus. Neck: No thyromegaly present. No bruit. Cardiovascular: Normal rate, regular rhythm and normal heart sounds. Exam reveals no friction rub. No murmur heard. Pulmonary/Chest: Effort normal and breath sounds normal. No respiratory distress. He has no wheezes. He has no rales. Abdominal: Soft. He exhibits no distension. There is no tenderness. There is no rebound. Neurological: He is alert and oriented to person, place, and time. Skin: No rash noted. No erythema.    Psychiatric: Mood and affect normal.   Nursing note and vitals reviewed. Health Maintenance Due   Topic Date Due    Influenza Age 5 to Adult  08/01/2019    FOOT EXAM Q1  08/17/2019         Assessment and orders:     Encounter Diagnoses     ICD-10-CM ICD-9-CM   1. 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 Prostate cancer ([facility] C61 185   4. Chronic systolic congestive heart failure ([facility]) I50.22 428.22     428.0   5. Hypothyroidism due to acquired atrophy of thyroid E03.4 244.8     246.8   6. Pure hypercholesterolemia E78.00 272.0   7. Dyslipidemia E78.5 272.4   8. Statin intolerance Z78.9 995.27     Diagnoses and all orders for this visit:    1. 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 Prostate cancer ([facility])-watchful waiting, slow-growing. 4. Chronic systolic congestive heart failure ([facility])-stable    5. Hypothyroidism due to acquired atrophy of thyroid-treated    6. Pure hypercholesterolemia-retest  -     LIPID PANEL  -     METABOLIC PANEL, COMPREHENSIVE    7. Dyslipidemia  -     LIPID PANEL  -     METABOLIC PANEL, COMPREHENSIVE    8. Statin intolerance-has been able to tolerate low-dose Crestor  -     LIPID PANEL  -     METABOLIC PANEL, COMPREHENSIVE      Plan of care:  Discussed diagnoses in detail with patient. Medication risks/benefits/side effects discussed with patient.      All of the patient's questions were addressed. The patient understands and agrees with our plan of care. The patient knows to call back if they are unsure of or forget any changes we discussed today or if the symptoms change. The patient received an After-Visit Summary which contains VS, orders, medication list and allergy list. This can be used as a \"mini-medical record\" should they have to seek medical care while out of town. Patient Care Team:  Yris Chavira MD as PCP - Justa Lafleur MD (Urology)  Sanjay Ramachandran MD (Cardiology)  Shilpa Hendricks MD (Ophthalmology)  Chris Dorsey RN as Ambulatory Care Navigator    Follow-up and Dispositions    · Return in about 4 months (around 12/14/2019). Future Appointments   Date Time Provider Sarina Richards   12/9/2019  2:00 PM MAK GARCIA   12/9/2019  3:00 PM Sanjay Ramachandran MD [address] [place] [address]       Signed By: Lin Perkins MD     August 14, 2019      ATTENTION:   This medical record was transcribed using an electronic medical records/speech recognition system. Although proofread, it may and can contain electronic, spelling and other errors. Corrections may be executed at a later time. Please feel free to contact me for any clarifications as needed.

## 2019-08-14 NOTE — PATIENT INSTRUCTIONS

## 2019-08-14 NOTE — PROGRESS NOTES
1. Have you been to the ER, urgent care clinic since your last visit? Hospitalized since your last visit? No    2. Have you seen or consulted any other health care providers outside of the [address] [place] [address] since your last visit? Include any pap smears or colon screening.  No  Reviewed record in preparation for visit and have necessary documentation  Pt did not bring medication to office visit for review  Information was given to pt on Advanced Directives, Living Will  Information was given on Shingles Vaccine  opportunity was given for questions  Goals that were addressed and/or need to be completed during or after this appointment include     Health Maintenance Due   Topic Date Due    Pneumococcal 65+ years (2 of 2 - PPSV23) 08/13/2016    Influenza Age 9 to Adult  08/01/2019    FOOT EXAM Q1  08/17/2019

## 2019-08-15 LAB
ALBUMIN SERPL-MCNC: 4.5 G/DL (ref 3.5–4.8)
ALBUMIN/GLOB SERPL: 2 {RATIO} (ref 1.2–2.2)
ALP SERPL-CCNC: 54 IU/L (ref 39–117)
ALT SERPL-CCNC: 15 IU/L (ref 0–44)
AST SERPL-CCNC: 18 IU/L (ref 0–40)
BILIRUB SERPL-MCNC: 0.6 MG/DL (ref 0–1.2)
BUN SERPL-MCNC: 13 MG/DL (ref 8–27)
BUN/CREAT SERPL: 13 (ref 10–24)
CALCIUM SERPL-MCNC: 9.7 MG/DL (ref 8.6–10.2)
CHLORIDE SERPL-SCNC: 102 MMOL/L (ref 96–106)
CHOLEST SERPL-MCNC: 130 MG/DL (ref 100–199)
CO2 SERPL-SCNC: 25 MMOL/L (ref 20–29)
CREAT SERPL-MCNC: 1.04 MG/DL (ref 0.76–1.27)
EST. AVERAGE GLUCOSE BLD GHB EST-MCNC: 117 MG/DL
GLOBULIN SER CALC-MCNC: 2.3 G/DL (ref 1.5–4.5)
GLUCOSE SERPL-MCNC: 109 MG/DL (ref 65–99)
HBA1C MFR BLD: 5.7 % (ref 4.8–5.6)
HDLC SERPL-MCNC: 43 MG/DL
LDLC SERPL CALC-MCNC: 63 MG/DL (ref 0–99)
POTASSIUM SERPL-SCNC: 4.8 MMOL/L (ref 3.5–5.2)
PROT SERPL-MCNC: 6.8 G/DL (ref 6–8.5)
SODIUM SERPL-SCNC: 141 MMOL/L (ref 134–144)
TRIGL SERPL-MCNC: 118 MG/DL (ref 0–149)
VLDLC SERPL CALC-MCNC: 24 MG/DL (ref 5–40)

## 2019-09-20 ENCOUNTER — CLINICAL SUPPORT (OUTPATIENT)
Dept: FAMILY MEDICINE CLINIC | Age: 79
End: 2019-09-20

## 2019-09-20 VITALS
RESPIRATION RATE: 16 BRPM | TEMPERATURE: 98.4 F | HEART RATE: 65 BPM | OXYGEN SATURATION: 95 % | SYSTOLIC BLOOD PRESSURE: 132 MMHG | DIASTOLIC BLOOD PRESSURE: 55 MMHG

## 2019-09-20 DIAGNOSIS — E08.3291 MILD NONPROLIFERATIVE DIABETIC RETINOPATHY OF RIGHT EYE WITHOUT MACULAR EDEMA ASSOCIATED WITH DIABETES MELLITUS DUE TO UNDERLYING CONDITION (HCC): Chronic | ICD-10-CM

## 2019-09-20 DIAGNOSIS — I49.3 ASYMPTOMATIC PVCS: ICD-10-CM

## 2019-09-20 DIAGNOSIS — E13.8: Primary | ICD-10-CM

## 2019-09-20 DIAGNOSIS — E03.4 HYPOTHYROIDISM DUE TO ACQUIRED ATROPHY OF THYROID: Chronic | ICD-10-CM

## 2019-09-20 DIAGNOSIS — I50.22 CHRONIC SYSTOLIC CONGESTIVE HEART FAILURE (HCC): ICD-10-CM

## 2019-09-20 DIAGNOSIS — Z23 ENCOUNTER FOR IMMUNIZATION: ICD-10-CM

## 2019-09-20 DIAGNOSIS — I25.5 ISCHEMIC CARDIOMYOPATHY: ICD-10-CM

## 2019-12-09 ENCOUNTER — OFFICE VISIT (OUTPATIENT)
Dept: CARDIOLOGY CLINIC | Age: 79
End: 2019-12-09

## 2019-12-09 VITALS
SYSTOLIC BLOOD PRESSURE: 126 MMHG | HEART RATE: 78 BPM | BODY MASS INDEX: 29.41 KG/M2 | DIASTOLIC BLOOD PRESSURE: 80 MMHG | RESPIRATION RATE: 16 BRPM | HEIGHT: 66 IN | WEIGHT: 183 LBS | OXYGEN SATURATION: 96 %

## 2019-12-09 DIAGNOSIS — E78.5 DYSLIPIDEMIA: ICD-10-CM

## 2019-12-09 DIAGNOSIS — I25.5 ISCHEMIC CARDIOMYOPATHY: Primary | ICD-10-CM

## 2019-12-09 DIAGNOSIS — Z78.9 STATIN INTOLERANCE: ICD-10-CM

## 2019-12-09 DIAGNOSIS — E13.8: ICD-10-CM

## 2019-12-09 DIAGNOSIS — I50.22 CHRONIC SYSTOLIC CONGESTIVE HEART FAILURE (HCC): ICD-10-CM

## 2019-12-09 NOTE — LETTER
12/15/19 Patient: Jimmie Clayton YOB: 1940 Date of Visit: 12/9/2019 Jeffrey Geronimo MD 
[address] [address] VIA In Basket Dear Jeffrey Geronimo MD, Thank you for referring Mr. Ariane Cisneros to [address] for evaluation. My notes for this consultation are attached. If you have questions, please do not hesitate to call me. I look forward to following your patient along with you. Sincerely, Fracisco Gurrola MD

## 2019-12-09 NOTE — PROGRESS NOTES
Ankita LEÓN [address]  [address],  [address]  [place], [address] [facility]    Office [phone]  Fax [phone]  Cell [phone]        Genet Pressley is a 67 y.o. male Last seen by me 6 months ago. Assessment  Encounter Diagnoses   Name Primary? 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 Cardiac MRI 2014 showed evidence of RCA territory infarction without viability. Updated echo today demonstrates EF 30-35%, similar to previous studies. He has stage B HF at a good functional capacity. He is on good medical therapy, optimal dosing limited by bradycardia and lowish BP. Continue current doses of Carvedilol and Trandolapril. At risk for SCD. We have discussed ICD for primary prevention. He has declined previously and again today. Lipids and DM managed by Dave Solitario MD. Recent labs from Aug demonstrate LDL 63, A1c 5.7%    Follow-up and Dispositions    · Return in about 1 year (around 12/9/2020). Subjective:    Genet Pressley reports he is feeling well overall with no interval cardiac concerns. 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 Patient denies any exertional chest pain, dyspnea, palpitations, syncope, orthopnea, edema or paroxysmal nocturnal dyspnea. He reports feeling dizzy if he gets on a creeper and rolls under vehicle. She sleeps well and has a good appetite. 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        Cardiac testing  Echo 9/2013 - global HK, EF 30-35%  Stress echo 9/2013- poor exercise capacity, resting global HK, EF 30%, no enhancement of EF with exercise  Cath 9/24/2013 - EDP 16, no AV gradient, LV dilated, EF 25-30% global, LM large nl, LAD mild plaque, LCX nl, RCA normal.    Echo 5/15/2014 - EF 30-35%, global HK    Cardiac MRI 6/2014 - Dense medium size subendocardial infarct of the basal and mid   inferolateral and lateral walls (primarily mid segments) with myocardial   wall thinning and fatty metaplasia of the infarct suggest chronic non   reperfusing infarct. No significant viability in these segments. All other   segments are completely viable. LVEF 42%. Echo 1/5/15 - EF 30-35%. inferior and inferolateral akinesis, mild MR    Echo 5/24/18 - EF 35-40%. Mod HK of basal inferior walls. Mild LVH. Mild TR. AoV sclerosis without stenosis. Aortic root mild dilatation; ascending aorta 3.9cm. Improvement in LF function when compared to study 1/2015. Echo 12/9/19 - EF 30-35%, severe inferior hypokinesis    Past Medical History:   Diagnosis Date    Cardiomyopathy, dilated, nonischemic (HCC)     Colon polyps     Hypercholesterolemia     [facility]     Cielo 6, watch and wait strategy    [facility] spotted fever)     T2DM (type 2 diabetes mellitus) ([facility] 8/21/2013        Current Outpatient Medications   Medication Sig Dispense Refill    rosuvastatin (CRESTOR) 10 mg tablet Take 1 Tab by mouth daily. 30 Tab 5    trandolapril (MAVIK) 2 mg tablet TAKE 1 TABLET NIGHTLY FOR LEFT VENTRICULAR DYSFUNCTION FOLLOWING MI 90 Tab 3    carvedilol (COREG) 3.125 mg tablet TAKE 1 TABLET TWICE A DAY WITH MEALS 180 Tab 3    levothyroxine (SYNTHROID) 50 mcg tablet TAKE 1 TABLET DAILY BEFORE BREAKFAST 90 Tab 3    varicella-zoster (SHINGRIX) injection Shingrix, one injection now and repeat in 4-6 months. To be administered at Pharmacy. Fax confirmation to me at [phone]. 2 Each 0    glucose blood VI test strips (FREESTYLE LITE STRIPS) strip USE TO TEST ONCE DAILY 100 Strip 5    FREESTYLE LANCETS 28 gauge misc USE TO TEST DAILY 100 Lancet 4    aspirin delayed-release 81 mg tablet Take  by mouth daily.       Cholecalciferol, Vitamin D3, (VITAMIN D3) 1,000 unit cap Take  by mouth daily.  fish oil-dha-epa (FISH OIL) 1,200-144-216 mg Cap Take  by mouth.  VITAMIN B COMPLEX (B COMPLEX PO) Take  by mouth.  PV W-O MIKO/FERROUS FUMARATE/FA (M-VIT PO) Take  by mouth. Allergies   Allergen Reactions    Pcn [Penicillins] Rash and Swelling    Robitussin [Guaifenesin] Hives      Retired. Review of Systems  Constitutional: Negative for fever, chills, malaise/fatigue and diaphoresis.  +dizzy  Respiratory: Negative for cough, hemoptysis, sputum production, shortness of breath and wheezing. Cardiovascular: Negative for chest pain, palpitations, orthopnea, claudication, leg swelling and PND. Gastrointestinal: Negative for heartburn, nausea, vomiting, blood in stool and melena. Genitourinary: Negative for dysuria and flank pain. Musculoskeletal: Negative for joint pain and back pain. Skin: Negative for rash. Neurological: Negative for focal weakness, seizures, loss of consciousness, weakness and headaches. Endo/Heme/Allergies: Does not bruise/bleed easily. Psychiatric/Behavioral: Negative for memory loss. The patient does not have insomnia. Physical Exam    Visit Vitals  /80 (BP 1 Location: Left arm, BP Patient Position: Sitting)   Pulse 78   Resp 16   Ht 5' 6\" (1.676 m)   Wt 183 lb (83 kg)   SpO2 96%   BMI 29.54 kg/m²     Wt Readings from Last 3 Encounters:   12/09/19 183 lb (83 kg)   12/09/19 183 lb (83 kg)   08/14/19 179 lb 9.6 oz (81.5 kg)      General - well developed well nourished  Neck - JVP normal, thyroid nl  Cardiac - normal S1, S2, no murmurs, rubs or gallops.  No clicks  Vascular - carotids without bruits, radials, femorals and pedal pulses equal bilateral  Lungs - clear to auscultation bilaterals, no rales, wheezing or rhonchi  Abd - soft nontender, no HSM, no abd bruits  Extremities - no edema  Skin - no rash  Neuro - nonfocal  Psych - normal mood and affect    Cardiographics  EKG 11/2014 - Sinus rhythm with PVCs, mild IVCD  Echo 1/5/15 - EF 30-35%. inferior and inferolateral akinesis, mild MR  EKG 4/16/18- SR, 1st degree AV block , LAHB, IVCD  EKG 5/31/19: SR, , LAHB  Echo 12/9/19 - EF 30-35%, severe inferior hypokinesis        Written by Rafat Presley, as dictated by Stacie Wayne M.D.      Stacie Wayne MD

## 2019-12-09 NOTE — PROGRESS NOTES
Chelita Newby is a 67 y.o. male    Chief Complaint   Patient presents with    Follow-up     6 mo f/u    CHF    Cardiomyopathy    Cholesterol Problem       Visit Vitals  /80 (BP 1 Location: Left arm, BP Patient Position: Sitting)   Pulse 78   Resp 16   Ht 5' 6\" (1.676 m)   Wt 183 lb (83 kg)   SpO2 96%   BMI 29.54 kg/m²       1. Have you been to the ER, urgent care clinic since your last visit? Hospitalized since your last visit? No    2. Have you seen or consulted any other health care providers outside of the [address] [place] [address] since your last visit? Include any pap smears or colon screening.  No

## 2020-01-07 ENCOUNTER — OFFICE VISIT (OUTPATIENT)
Dept: FAMILY MEDICINE CLINIC | Age: 80
End: 2020-01-07

## 2020-01-07 VITALS
BODY MASS INDEX: 29.41 KG/M2 | RESPIRATION RATE: 20 BRPM | TEMPERATURE: 98 F | WEIGHT: 183 LBS | DIASTOLIC BLOOD PRESSURE: 76 MMHG | OXYGEN SATURATION: 96 % | HEIGHT: 66 IN | SYSTOLIC BLOOD PRESSURE: 116 MMHG | HEART RATE: 58 BPM

## 2020-01-07 DIAGNOSIS — I50.22 CHRONIC SYSTOLIC CONGESTIVE HEART FAILURE (HCC): ICD-10-CM

## 2020-01-07 DIAGNOSIS — E13.8: ICD-10-CM

## 2020-01-07 DIAGNOSIS — C61 PROSTATE CANCER (HCC): Chronic | ICD-10-CM

## 2020-01-07 DIAGNOSIS — E08.3291 MILD NONPROLIFERATIVE DIABETIC RETINOPATHY OF RIGHT EYE WITHOUT MACULAR EDEMA ASSOCIATED WITH DIABETES MELLITUS DUE TO UNDERLYING CONDITION (HCC): Primary | Chronic | ICD-10-CM

## 2020-01-07 DIAGNOSIS — Z23 ENCOUNTER FOR IMMUNIZATION: ICD-10-CM

## 2020-01-07 DIAGNOSIS — G56.01 CARPAL TUNNEL SYNDROME OF RIGHT WRIST: Chronic | ICD-10-CM

## 2020-01-07 NOTE — PROGRESS NOTES
[address] [place] [address]  [phone]           Progress Note    Patient: Carlos Manuel Gonzalez MRN: [id number]  SSN: [id number]    YOB: 1940  Age: 67 y.o. Sex: male        Chief Complaint   Patient presents with    Labs    Cholesterol Problem         Subjective:     Encounter Diagnoses   Name Primary? 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 Overall the patient feels well with good energy level. Key Antihyperglycemic Medications     Patient is on no antihyperglycemic meds. Pertinent Labs:   Lab Results   Component Value Date/Time    Hemoglobin A1c 5.7 (H) 08/14/2019 09:25 AM    Hemoglobin A1c 5.7 (H) 05/14/2019 09:53 AM    Hemoglobin A1c 6.1 (H) 02/05/2019 09:05 AM      Body mass index is 29.54 kg/m². Lab Results   Component Value Date/Time    LDL, calculated 63 08/14/2019 09:25 AM         Lab Results   Component Value Date/Time    Sodium 141 08/14/2019 09:25 AM    Potassium 4.8 08/14/2019 09:25 AM    Chloride 102 08/14/2019 09:25 AM    CO2 25 08/14/2019 09:25 AM    Anion gap 10 02/27/2019 11:24 AM    Glucose 109 (H) 08/14/2019 09:25 AM    BUN 13 08/14/2019 09:25 AM    Creatinine 1.04 08/14/2019 09:25 AM    BUN/Creatinine ratio 13 08/14/2019 09:25 AM    GFR est AA 80 08/14/2019 09:25 AM    GFR est non-AA 69 08/14/2019 09:25 AM    Calcium 9.7 08/14/2019 09:25 AM    AST (SGOT) 18 08/14/2019 09:25 AM    Alk.  phosphatase 54 08/14/2019 09:25 AM    Protein, total 6.8 08/14/2019 09:25 AM    Albumin 4.5 08/14/2019 09:25 AM    Globulin 5.2 (H) 02/27/2019 11:24 AM    A-G Ratio 2.0 08/14/2019 09:25 AM    ALT (SGPT) 15 08/14/2019 09:25 AM     Lab Results   Component Value Date/Time    Microalbumin/Creat ratio (mg/g creat) 13 12/08/2009 11:22 AM Microalb/Creat ratio (ug/mg creat.) 8.7 02/05/2019 09:05 AM    Microalbumin,urine random 1.72 12/08/2009 11:22 AM      Frequency of home glucose testing: Occasional   Blood Sugar range at home:    Last eye exam: I due for appointment   Last foot exam: This year. Polyuria, polyphagia or polydipsia: No   Retinopathy: Yes   Neuropathy SX: No    Low blood sugar symptoms: No   Dietary compliance: Good   Medication compliance:Good   On ASA: Yes   Depression: No   CKD:no     Wt Readings from Last 3 Encounters:   01/07/20 183 lb (83 kg)   12/09/19 183 lb (83 kg)   12/09/19 183 lb (83 kg)        Social History     Tobacco Use   Smoking Status Never Smoker   Smokeless Tobacco Never Used     Body mass index is 29.54 kg/m². Diabetic Consultants: All the patient's questions regarding medications, diet and exercise were answered. Goal of A1C of less than 7.5% is our goal.   Our overall goal is to reduce or eliminate the long term consequences of poorly controlled diabetes. Yes    Prostate cancer [facility]): Dr. Daya Resendiz turned him loose for 1 year due to the fact that his exam was normal and his PSA had actually come down. He also has a slow-growing type. 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  Chronic systolic congestive heart failure ([facility]):  Compensated with no orthopnea PND or ALLEN. His most recent echocardiogram shows ejection fraction to be 31 to 35%. Last year it was 35 to 40%.  Carpal tunnel syndrome of right wrist: This is a new problem for him. He works as an  for race vehicles. He has noticed some increased weakness in his right hand. He also has some numbness and tingling in the first 3 right digits. I recommended he go see the hand surgeon but he wants to postpone this until he finishes a particular job.  Encounter for immunization: He is due to get a pneumonia 21.   When he got his last varicella vaccine he had some local swelling and now has pain in that area. No systemic reaction. Current and past medical information:    Current Medications after this visit[de-identified]     Current Outpatient Medications   Medication Sig    rosuvastatin (CRESTOR) 10 mg tablet Take 1 Tab by mouth daily.  trandolapril (MAVIK) 2 mg tablet TAKE 1 TABLET NIGHTLY FOR LEFT VENTRICULAR DYSFUNCTION FOLLOWING MI    carvedilol (COREG) 3.125 mg tablet TAKE 1 TABLET TWICE A DAY WITH MEALS    levothyroxine (SYNTHROID) 50 mcg tablet TAKE 1 TABLET DAILY BEFORE BREAKFAST    glucose blood VI test strips (FREESTYLE LITE STRIPS) strip USE TO TEST ONCE DAILY    FREESTYLE LANCETS 28 gauge misc USE TO TEST DAILY    aspirin delayed-release 81 mg tablet Take  by mouth daily.  Cholecalciferol, Vitamin D3, (VITAMIN D3) 1,000 unit cap Take  by mouth daily.  fish oil-dha-epa (FISH OIL) 1,200-144-216 mg Cap Take  by mouth.  VITAMIN B COMPLEX (B COMPLEX PO) Take  by mouth.  PV W-O MIKO/FERROUS FUMARATE/FA (M-VIT PO) Take  by mouth.  varicella-zoster [facility]) injection Shingrix, one injection now and repeat in 4-6 months. To be administered at Pharmacy. Fax confirmation to me at [phone]. No current facility-administered medications for this visit.         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 ([facility] 57/08/3473     Priority: 1 - One    Mild 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 edema associated with diabetes mellitus due to underlying condition ([facility] 12/15/2015     Priority: 1 - One    Prostate cancer ([facility] 12/15/2015     Priority: 1 - One    Ischemic cardiomyopathy 01/06/2015     Priority: 1 - One    Asymptomatic PVCs 08/21/2013     Priority: 1 - One    Hypothyroidism 09/05/2012     Priority: 1 - One    Colon polyps 05/31/2010     Priority: 6 - Six    Statin intolerance 05/31/2019    Dyslipidemia 05/16/2018    [facility] spotted fever)        Past Medical History:   Diagnosis Date    Cardiomyopathy, dilated, nonischemic ([facility]     Colon polyps     Hypercholesterolemia     Prostate cancer ([facility]     Cielo 6, watch and wait strategy    [facility] spotted fever)     T2DM (type 2 diabetes mellitus) ([facility] 8/21/2013       Allergies   Allergen Reactions    Pcn [Penicillins] Rash and Swelling    Robitussin [Guaifenesin] Hives       Past Surgical History:   Procedure Laterality Date    CARDIAC SURG PROCEDURE UNLIST      HX HERNIA REPAIR      left inguinal x 2       Social History     Socioeconomic History    Marital status:      Spouse name: Not on file    Number of children: Not on file    Years of education: Not on file    Highest education level: Not on file   Tobacco Use    Smoking status: Never Smoker    Smokeless tobacco: Never Used   Substance and Sexual Activity    Alcohol use: No    Drug use: No       Review of Systems   Constitutional: Negative. Negative for chills, fever, malaise/fatigue and weight loss. HENT: Negative. Negative for hearing loss. Eyes: Negative. Negative for blurred vision and double vision. Respiratory: Negative. Negative for cough, sputum production and shortness of breath. Cardiovascular: Negative. Negative for chest pain and palpitations. Gastrointestinal: Negative. Negative for abdominal pain, blood in stool, heartburn, nausea and vomiting. Genitourinary: Negative. Negative for dysuria, frequency and urgency. Musculoskeletal: Negative. Negative for back pain, falls, myalgias and neck pain. Skin: Negative. Negative for rash. Neurological: Positive for weakness. Negative for dizziness, tingling, tremors and headaches. He has some proximal right thenar atrophy. He notes that his  strength has decreased as well. Endo/Heme/Allergies: Negative. Psychiatric/Behavioral: Negative. Negative for depression. Objective:     Vitals:    01/07/20 1053   BP: 116/76   Pulse: (!) 58   Resp: 20   Temp: 98 °F (36.7 °C)   TempSrc: Oral   SpO2: 96%   Weight: 183 lb (83 kg)   Height: 5' 6\" (1.676 m)      Body mass index is 29.54 kg/m². Physical Exam  Vitals signs and nursing note reviewed. Constitutional:       General: He is not in acute distress. Appearance: Normal appearance. He is well-developed. He is not toxic-appearing. HENT:      Head: Normocephalic and atraumatic. Nose: No congestion. Mouth/Throat:      Pharynx: No oropharyngeal exudate or posterior oropharyngeal erythema. Eyes:      General: No scleral icterus. Right eye: No discharge. Left eye: No discharge. Neck:      Comments: No bruit. Cardiovascular:      Rate and Rhythm: Normal rate and regular rhythm. Heart sounds: Normal heart sounds. No murmur. No friction rub. No gallop. Pulmonary:      Effort: Pulmonary effort is normal. No respiratory distress. Breath sounds: Normal breath sounds. No wheezing, rhonchi or rales. Abdominal:      General: There is no distension. Musculoskeletal:         General: Swelling present. Comments: He has degenerative joint disease with joint deformity in his hands. His right fifth digit cannot be completely straightened out. He has some thenar wasting at his right proximal thenar hand muscle. Skin:     General: Skin is warm. Coloration: Skin is not jaundiced. Findings: No erythema or rash. Neurological:      Mental Status: He is alert.            Health Maintenance Due   Topic Date Due    Pneumococcal 65+ years (2 of 2 - PPSV23) 08/13/2016    MEDICARE YEARLY EXAM  12/20/2019    MICROALBUMIN Q1  02/05/2020         Assessment and orders:     Encounter Diagnoses     ICD-10-CM ICD-9-CM   1. 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 Prostate cancer ([facility] C61 185   3. 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 Chronic systolic congestive heart failure (HCC) I50.22 428.22     428.0   5. Carpal tunnel syndrome of right wrist G56.01 354.0   6. Encounter for immunization Z23 V03.89     Diagnoses and all orders for this visit:    1. 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  -     T4, FREE; Future  -     HEMOGLOBIN A1C WITH EAG; Future  -     LIPID PANEL; Future  -     METABOLIC PANEL, COMPREHENSIVE; Future    2. Prostate cancer (HCC)-he is doing well without symptoms. 3. 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 Chronic systolic congestive heart failure (HCC)-compensated  -     CBC WITH AUTOMATED DIFF; Future  -     T4, FREE; Future    5. Carpal tunnel syndrome of right wrist-new problem  -     T4, FREE; Future  -     HEMOGLOBIN A1C WITH EAG; Future  -     METABOLIC PANEL, COMPREHENSIVE; Future    6. Encounter for immunization  -     THER/PROPH/DIAG INJECTION, SUBCUT/IM  -     PNEUMOCOCCAL POLYSACCHARIDE VACCINE, 23-VALENT, ADULT OR IMMUNOSUPPRESSED PT DOSE,        Plan of care:  Discussed diagnoses in detail with patient. Medication risks/benefits/side effects discussed with patient. All of the patient's questions were addressed. The patient understands and agrees with our plan of care.     The patient knows to call back if they are unsure of or forget any changes we discussed today or if the symptoms change. The patient received an After-Visit Summary which contains VS, orders, medication list and allergy list. This can be used as a \"mini-medical record\" should they have to seek medical care while out of town. Patient Care Team:  Brunson Ave, MD as PCP - General  Boy Vivar MD as PCP - [facility] Empaneled Provider  Catherine Hernandez MD (Urology)  Rigoberto Lee MD (Cardiology)  Bina Skaggs MD (Ophthalmology)    Follow-up and Dispositions    · Return in about 4 months (around 5/7/2020) for due for medicare wellness. Future Appointments   Date Time Provider Sarina Richards   12/11/2020  1:00 PM MAK GARCIA   12/11/2020  2:00 PM Rigoberto Lee MD [address] [place] [address]       Signed By: Hector Alba MD     January 7, 2020      ATTENTION:   This medical record was transcribed using an electronic medical records/speech recognition system. Although proofread, it may and can contain electronic, spelling and other errors. Corrections may be executed at a later time. Please feel free to contact me for any clarifications as needed.

## 2020-01-07 NOTE — PROGRESS NOTES
1. Have you been to the ER, urgent care clinic since your last visit? Hospitalized since your last visit? No    2. Have you seen or consulted any other health care providers outside of the [address] [place] [address] since your last visit? Include any pap smears or colon screening. No    Reviewed record in preparation for visit and have necessary documentation  Goals that were addressed and/or need to be completed during or after this appointment include     Health Maintenance Due   Topic Date Due    Pneumococcal 65+ years (2 of 2 - PPSV23) 08/13/2016    MEDICARE YEARLY EXAM  12/20/2019    MICROALBUMIN Q1  02/05/2020       Patient is accompanied by self I have received verbal consent from Mark Geller to discuss any/all medical information while they are present in the room.

## 2020-01-07 NOTE — PATIENT INSTRUCTIONS

## 2020-01-15 ENCOUNTER — HOSPITAL ENCOUNTER (OUTPATIENT)
Dept: LAB | Age: 80
Discharge: HOME | End: 2020-01-15

## 2020-01-15 DIAGNOSIS — G56.01 CARPAL TUNNEL SYNDROME OF RIGHT WRIST: Chronic | ICD-10-CM

## 2020-01-15 DIAGNOSIS — E08.3291 MILD NONPROLIFERATIVE DIABETIC RETINOPATHY OF RIGHT EYE WITHOUT MACULAR EDEMA ASSOCIATED WITH DIABETES MELLITUS DUE TO UNDERLYING CONDITION (HCC): Chronic | ICD-10-CM

## 2020-01-15 DIAGNOSIS — I50.22 CHRONIC SYSTOLIC CONGESTIVE HEART FAILURE (HCC): ICD-10-CM

## 2020-01-15 DIAGNOSIS — E13.8: ICD-10-CM

## 2020-01-15 LAB
ALBUMIN SERPL-MCNC: 4.1 G/DL (ref 3.5–5)
ALBUMIN/GLOB SERPL: 1.3 {RATIO} (ref 1.1–2.2)
ALP SERPL-CCNC: 67 U/L (ref 45–117)
ALT SERPL-CCNC: 28 U/L (ref 12–78)
ANION GAP SERPL CALC-SCNC: 3 MMOL/L (ref 5–15)
AST SERPL-CCNC: 18 U/L (ref 15–37)
BASOPHILS # BLD: 0 K/UL (ref 0–0.1)
BASOPHILS NFR BLD: 1 % (ref 0–1)
BILIRUB SERPL-MCNC: 0.6 MG/DL (ref 0.2–1)
BUN SERPL-MCNC: 17 MG/DL (ref 6–20)
BUN/CREAT SERPL: 16 (ref 12–20)
CALCIUM SERPL-MCNC: 9.3 MG/DL (ref 8.5–10.1)
CHLORIDE SERPL-SCNC: 106 MMOL/L (ref 97–108)
CHOLEST SERPL-MCNC: 158 MG/DL
CO2 SERPL-SCNC: 30 MMOL/L (ref 21–32)
CREAT SERPL-MCNC: 1.07 MG/DL (ref 0.7–1.3)
CREAT UR-MCNC: 135 MG/DL
DIFFERENTIAL METHOD BLD: ABNORMAL
EOSINOPHIL # BLD: 0.1 K/UL (ref 0–0.4)
EOSINOPHIL NFR BLD: 2 % (ref 0–7)
ERYTHROCYTE [DISTWIDTH] IN BLOOD BY AUTOMATED COUNT: 12.6 % (ref 11.5–14.5)
EST. AVERAGE GLUCOSE BLD GHB EST-MCNC: 123 MG/DL
GLOBULIN SER CALC-MCNC: 3.1 G/DL (ref 2–4)
GLUCOSE SERPL-MCNC: 129 MG/DL (ref 65–100)
HBA1C MFR BLD: 5.9 % (ref 4–5.6)
HCT VFR BLD AUTO: 46.9 % (ref 36.6–50.3)
HDLC SERPL-MCNC: 52 MG/DL
HDLC SERPL: 3 {RATIO} (ref 0–5)
HGB BLD-MCNC: 14.8 G/DL (ref 12.1–17)
IMM GRANULOCYTES # BLD AUTO: 0 K/UL (ref 0–0.04)
IMM GRANULOCYTES NFR BLD AUTO: 0 % (ref 0–0.5)
LDLC SERPL CALC-MCNC: 92.2 MG/DL (ref 0–100)
LIPID PROFILE,FLP: NORMAL
LYMPHOCYTES # BLD: 2 K/UL (ref 0.8–3.5)
LYMPHOCYTES NFR BLD: 30 % (ref 12–49)
MCH RBC QN AUTO: 32.5 PG (ref 26–34)
MCHC RBC AUTO-ENTMCNC: 31.6 G/DL (ref 30–36.5)
MCV RBC AUTO: 103.1 FL (ref 80–99)
MICROALBUMIN UR-MCNC: 1.79 MG/DL
MICROALBUMIN/CREAT UR-RTO: 13 MG/G (ref 0–30)
MONOCYTES # BLD: 0.6 K/UL (ref 0–1)
MONOCYTES NFR BLD: 9 % (ref 5–13)
NEUTS SEG # BLD: 4 K/UL (ref 1.8–8)
NEUTS SEG NFR BLD: 58 % (ref 32–75)
NRBC # BLD: 0 K/UL (ref 0–0.01)
NRBC BLD-RTO: 0 PER 100 WBC
PLATELET # BLD AUTO: 220 K/UL (ref 150–400)
PMV BLD AUTO: 10.9 FL (ref 8.9–12.9)
POTASSIUM SERPL-SCNC: 4.4 MMOL/L (ref 3.5–5.1)
PROT SERPL-MCNC: 7.2 G/DL (ref 6.4–8.2)
RBC # BLD AUTO: 4.55 M/UL (ref 4.1–5.7)
SODIUM SERPL-SCNC: 139 MMOL/L (ref 136–145)
T4 FREE SERPL-MCNC: 0.9 NG/DL (ref 0.8–1.5)
TRIGL SERPL-MCNC: 69 MG/DL (ref ?–150)
VLDLC SERPL CALC-MCNC: 13.8 MG/DL
WBC # BLD AUTO: 6.8 K/UL (ref 4.1–11.1)

## 2020-02-13 DIAGNOSIS — Z78.9 STATIN INTOLERANCE: ICD-10-CM

## 2020-02-13 DIAGNOSIS — I25.5 ISCHEMIC CARDIOMYOPATHY: ICD-10-CM

## 2020-02-13 RX ORDER — LEVOTHYROXINE SODIUM 50 UG/1
TABLET ORAL
Qty: 90 TAB | Refills: 4 | Status: SHIPPED | OUTPATIENT
Start: 2020-02-13 | End: 2020-02-13 | Stop reason: SDUPTHER

## 2020-02-13 RX ORDER — ROSUVASTATIN CALCIUM 10 MG/1
10 TABLET, COATED ORAL DAILY
Qty: 30 TAB | Refills: 5 | Status: SHIPPED | OUTPATIENT
Start: 2020-02-13 | End: 2020-02-26 | Stop reason: SDUPTHER

## 2020-02-13 RX ORDER — CARVEDILOL 3.12 MG/1
TABLET ORAL
Qty: 180 TAB | Refills: 4 | Status: SHIPPED | OUTPATIENT
Start: 2020-02-13 | End: 2021-03-15 | Stop reason: SDUPTHER

## 2020-02-13 RX ORDER — LEVOTHYROXINE SODIUM 50 UG/1
TABLET ORAL
Qty: 30 TAB | Refills: 0 | Status: SHIPPED | OUTPATIENT
Start: 2020-02-13 | End: 2021-03-15 | Stop reason: SDUPTHER

## 2020-02-13 NOTE — TELEPHONE ENCOUNTER
Pt states need a 30 day supply of Thyronxine Tabs 50mcg called into [facility] in [place] until supply comes through [facility]. .    Any questions please contact pt at [phone]

## 2020-02-26 DIAGNOSIS — I25.5 ISCHEMIC CARDIOMYOPATHY: ICD-10-CM

## 2020-02-26 DIAGNOSIS — Z78.9 STATIN INTOLERANCE: ICD-10-CM

## 2020-02-28 RX ORDER — ROSUVASTATIN CALCIUM 10 MG/1
10 TABLET, COATED ORAL DAILY
Qty: 90 TAB | Refills: 1 | Status: SHIPPED | OUTPATIENT
Start: 2020-02-28 | End: 2020-08-10

## 2020-08-08 DIAGNOSIS — I25.5 ISCHEMIC CARDIOMYOPATHY: ICD-10-CM

## 2020-08-08 DIAGNOSIS — Z78.9 STATIN INTOLERANCE: ICD-10-CM

## 2020-08-10 RX ORDER — ROSUVASTATIN CALCIUM 10 MG/1
TABLET, COATED ORAL
Qty: 90 TAB | Refills: 3 | Status: SHIPPED | OUTPATIENT
Start: 2020-08-10

## 2020-09-23 ENCOUNTER — CLINICAL SUPPORT (OUTPATIENT)
Dept: FAMILY MEDICINE CLINIC | Age: 80
End: 2020-09-23
Payer: MEDICARE

## 2020-09-23 VITALS — TEMPERATURE: 97.1 F

## 2020-09-23 DIAGNOSIS — Z23 ENCOUNTER FOR IMMUNIZATION: Primary | ICD-10-CM

## 2020-09-23 PROCEDURE — 90694 VACC AIIV4 NO PRSRV 0.5ML IM: CPT | Performed by: FAMILY MEDICINE

## 2020-09-23 PROCEDURE — G0008 ADMIN INFLUENZA VIRUS VAC: HCPCS | Performed by: FAMILY MEDICINE

## 2020-12-11 ENCOUNTER — ANCILLARY PROCEDURE (OUTPATIENT)
Dept: CARDIOLOGY CLINIC | Age: 80
End: 2020-12-11
Payer: MEDICARE

## 2020-12-11 VITALS
WEIGHT: 183 LBS | DIASTOLIC BLOOD PRESSURE: 76 MMHG | SYSTOLIC BLOOD PRESSURE: 116 MMHG | BODY MASS INDEX: 29.41 KG/M2 | HEIGHT: 66 IN

## 2020-12-11 DIAGNOSIS — I50.9 CONGESTIVE HEART FAILURE, UNSPECIFIED HF CHRONICITY, UNSPECIFIED HEART FAILURE TYPE (HCC): ICD-10-CM

## 2020-12-11 DIAGNOSIS — I25.5 ISCHEMIC CARDIOMYOPATHY: ICD-10-CM

## 2020-12-11 LAB
ECHO AO ASC DIAM: 4.32 CM
ECHO AO ROOT DIAM: 3.46 CM
ECHO AV AREA PEAK VELOCITY: 2.21 CM2
ECHO AV AREA VTI: 2.41 CM2
ECHO AV AREA/BSA PEAK VELOCITY: 1.1 CM2/M2
ECHO AV AREA/BSA VTI: 1.3 CM2/M2
ECHO AV MEAN GRADIENT: 4.71 MMHG
ECHO AV PEAK GRADIENT: 8.07 MMHG
ECHO AV PEAK VELOCITY: 142.05 CM/S
ECHO AV VTI: 28.43 CM
ECHO LA AREA 4C: 15.29 CM2
ECHO LA MAJOR AXIS: 4.51 CM
ECHO LA MINOR AXIS: 2.34 CM
ECHO LA VOL 2C: 34.63 ML (ref 18–58)
ECHO LA VOL 4C: 40.1 ML (ref 18–58)
ECHO LA VOL BP: 40.3 ML (ref 18–58)
ECHO LA VOL/BSA BIPLANE: 20.92 ML/M2 (ref 16–28)
ECHO LA VOLUME INDEX A2C: 17.98 ML/M2 (ref 16–28)
ECHO LA VOLUME INDEX A4C: 20.82 ML/M2 (ref 16–28)
ECHO LV INTERNAL DIMENSION DIASTOLIC: 5.83 CM (ref 4.2–5.9)
ECHO LV INTERNAL DIMENSION SYSTOLIC: 4.44 CM
ECHO LV IVSD: 1.22 CM (ref 0.6–1)
ECHO LV MASS 2D: 321.9 G (ref 88–224)
ECHO LV MASS INDEX 2D: 167.1 G/M2 (ref 49–115)
ECHO LV POSTERIOR WALL DIASTOLIC: 1.31 CM (ref 0.6–1)
ECHO LVOT DIAM: 2.22 CM
ECHO LVOT PEAK GRADIENT: 2.63 MMHG
ECHO LVOT PEAK VELOCITY: 81.09 CM/S
ECHO LVOT SV: 68.4 ML
ECHO LVOT VTI: 17.65 CM
ECHO RA AREA 4C: 16.66 CM2
ECHO RA MAJOR AXIS: 3.12 CM
ECHO RV INTERNAL DIMENSION: 3 CM
ECHO RV TAPSE: 1.72 CM (ref 1.5–2)
ECHO TV REGURGITANT MAX VELOCITY: 255.1 CM/S
ECHO TV REGURGITANT PEAK GRADIENT: 26.03 MMHG
LA VOL DISK BP: 37.32 ML (ref 18–58)

## 2020-12-11 PROCEDURE — 93306 TTE W/DOPPLER COMPLETE: CPT | Performed by: STUDENT IN AN ORGANIZED HEALTH CARE EDUCATION/TRAINING PROGRAM

## 2020-12-14 ENCOUNTER — TELEPHONE (OUTPATIENT)
Dept: CARDIOLOGY CLINIC | Age: 80
End: 2020-12-14

## 2020-12-16 NOTE — PROGRESS NOTES
Verified patient with two patient identifiers. Spoke with patient regarding Echo result and he will follow Deann Vu at his new practice.

## 2020-12-16 NOTE — TELEPHONE ENCOUNTER
Verified patient with two patient identifiers. Spoke with patient regarding Echo result and he will follow Gamal Lazaro at his new practice.

## 2021-03-16 ENCOUNTER — OFFICE VISIT (OUTPATIENT)
Dept: FAMILY MEDICINE CLINIC | Age: 81
End: 2021-03-16
Payer: MEDICARE

## 2021-03-16 VITALS
HEIGHT: 66 IN | SYSTOLIC BLOOD PRESSURE: 127 MMHG | BODY MASS INDEX: 29.38 KG/M2 | OXYGEN SATURATION: 97 % | DIASTOLIC BLOOD PRESSURE: 84 MMHG | RESPIRATION RATE: 16 BRPM | TEMPERATURE: 96.6 F | WEIGHT: 182.8 LBS | HEART RATE: 68 BPM

## 2021-03-16 DIAGNOSIS — Z00.00 MEDICARE ANNUAL WELLNESS VISIT, SUBSEQUENT: ICD-10-CM

## 2021-03-16 DIAGNOSIS — E11.3592 CONTROLLED TYPE 2 DIABETES MELLITUS WITH PROLIFERATIVE RETINOPATHY OF LEFT EYE, WITHOUT LONG-TERM CURRENT USE OF INSULIN, MACULAR EDEMA PRESENCE UNSPECIFIED, UNSPECIFIED PROLIFERATIVE RETINOPAT* (HCC): ICD-10-CM

## 2021-03-16 DIAGNOSIS — E03.4 HYPOTHYROIDISM DUE TO ACQUIRED ATROPHY OF THYROID: ICD-10-CM

## 2021-03-16 DIAGNOSIS — I50.22 CHRONIC SYSTOLIC CONGESTIVE HEART FAILURE (HCC): ICD-10-CM

## 2021-03-16 DIAGNOSIS — C61 PROSTATE CANCER (HCC): ICD-10-CM

## 2021-03-16 DIAGNOSIS — E78.00 PURE HYPERCHOLESTEROLEMIA: ICD-10-CM

## 2021-03-16 DIAGNOSIS — E13.8: Primary | ICD-10-CM

## 2021-03-16 LAB
ALBUMIN UR QL STRIP: 10 MG/L
BILIRUB UR QL STRIP: NEGATIVE
CREATININE, URINE POC: 100 MG/DL
GLUCOSE UR-MCNC: NEGATIVE MG/DL
KETONES P FAST UR STRIP-MCNC: NEGATIVE MG/DL
MICROALBUMIN/CREAT RATIO POC: <30 MG/G
PH UR STRIP: 6.5 [PH] (ref 4.6–8)
PROT UR QL STRIP: NEGATIVE
SP GR UR STRIP: 1.02 (ref 1–1.03)
UA UROBILINOGEN AMB POC: NORMAL (ref 0.2–1)
URINALYSIS CLARITY POC: CLEAR
URINALYSIS COLOR POC: YELLOW
URINE BLOOD POC: NEGATIVE
URINE LEUKOCYTES POC: NEGATIVE
URINE NITRITES POC: NEGATIVE

## 2021-03-16 PROCEDURE — G8427 DOCREV CUR MEDS BY ELIG CLIN: HCPCS | Performed by: FAMILY MEDICINE

## 2021-03-16 PROCEDURE — 82044 UR ALBUMIN SEMIQUANTITATIVE: CPT | Performed by: FAMILY MEDICINE

## 2021-03-16 PROCEDURE — 99214 OFFICE O/P EST MOD 30 MIN: CPT | Performed by: FAMILY MEDICINE

## 2021-03-16 PROCEDURE — G8510 SCR DEP NEG, NO PLAN REQD: HCPCS | Performed by: FAMILY MEDICINE

## 2021-03-16 PROCEDURE — G8419 CALC BMI OUT NRM PARAM NOF/U: HCPCS | Performed by: FAMILY MEDICINE

## 2021-03-16 PROCEDURE — G0439 PPPS, SUBSEQ VISIT: HCPCS | Performed by: FAMILY MEDICINE

## 2021-03-16 PROCEDURE — 1101F PT FALLS ASSESS-DOCD LE1/YR: CPT | Performed by: FAMILY MEDICINE

## 2021-03-16 PROCEDURE — G8536 NO DOC ELDER MAL SCRN: HCPCS | Performed by: FAMILY MEDICINE

## 2021-03-16 PROCEDURE — 81003 URINALYSIS AUTO W/O SCOPE: CPT | Performed by: FAMILY MEDICINE

## 2021-03-16 NOTE — PROGRESS NOTES
1. Have you been to the ER, urgent care clinic since your last visit? Hospitalized since your last visit? [facility] ER [id number], \"joints locked up\"  2. Have you seen or consulted any other health care providers outside of the [address] [place] [address] since your last visit? Include any pap smears or colon screening. No    Reviewed record in preparation for visit and have necessary documentation  Goals that were addressed and/or need to be completed during or after this appointment include     Health Maintenance Due   Topic Date Due    COVID-19 Vaccine (1) Never done    Medicare Yearly Exam  12/20/2019    A1C test (Diabetic or Prediabetic)  07/15/2020    Foot Exam Q1  08/14/2020    MICROALBUMIN Q1  01/15/2021    Lipid Screen  01/15/2021    GLAUCOMA SCREENING Q2Y  01/21/2021    Eye Exam Retinal or Dilated  01/21/2021       Patient is accompanied by self I have received verbal consent from Dipak Marquez to discuss any/all medical information while they are present in the room.

## 2021-03-16 NOTE — PROGRESS NOTES
Progress Note    Patient: Gt Carbone MRN: [id number]  SSN: [id number]    YOB: 1940  Age: 79 y.o. Sex: male        Chief Complaint   Patient presents with    Labs    Medication Refill     he is a 79y.o. year old male who presents for follow up of chronic health conditions. He is due for lab work. Patient feeling good sans new complaints or concerns at this time. Encounter Diagnoses   Name Primary? 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     Prostate cancer ([facility]     Medicare annual wellness visit, subsequent      Diabetes: This patient is being treating under a comprehensive plan of care for diabetes. Overall the patient feels well with good energy level. Insulin dependence: no   Pertinent Labs:   Lab Results   Component Value Date/Time    Hemoglobin A1c 5.8 (H) 03/16/2021 11:10 AM      Body mass index is 29.5 kg/m². Lab Results   Component Value Date/Time    LDL, calculated 151 (H) 03/16/2021 11:10 AM          Wt Readings from Last 3 Encounters:   03/16/21 182 lb 12.8 oz (82.9 kg)   12/11/20 183 lb (83 kg)   01/07/20 183 lb (83 kg)        Social History     Tobacco Use   Smoking Status Never Smoker   Smokeless Tobacco Never Used        Medications, diet and exercise as means of diabetic control with a goal of an A1C of less than 7.0% discussed. Diabetic foot care and annual eye exam discussed as well. Check blood sugars while fasting just before breakfast on most days and occasionally before dinner. Write down readings in a diabetic log book and bring them to the next visit. Call the office for fasting sugars over 200 or below 75 on two or more occasions. Call immediately if having symptoms of high sugar (frequent urination, always thirsty) or low sugar (dizzy, lethargic, sweaty, nauseated, headache). Our overall goal is to reduce or eliminate the long term consequences of poorly controlled diabetes. Patient expresses understanding and agreement with our plan of care. Hypertension:  The patient reports:  taking medications as instructed, no medication side effects noted, no TIA's, no chest pain on exertion, no dyspnea on exertion, no swelling of ankles. BP Readings from Last 3 Encounters:   03/16/21 127/84   12/11/20 116/76   01/07/20 116/76     Lab Results   Component Value Date/Time    Sodium 137 03/16/2021 11:10 AM    Potassium 4.8 03/16/2021 11:10 AM    Chloride 102 03/16/2021 11:10 AM    CO2 30 03/16/2021 11:10 AM    Anion gap 5 03/16/2021 11:10 AM    Glucose 107 (H) 03/16/2021 11:10 AM    BUN 17 03/16/2021 11:10 AM    Creatinine 1.02 03/16/2021 11:10 AM    BUN/Creatinine ratio 17 03/16/2021 11:10 AM    GFR est AA >60 03/16/2021 11:10 AM    GFR est non-AA >60 03/16/2021 11:10 AM    Calcium 9.5 03/16/2021 11:10 AM     Patient advised to log blood pressures at home weekly and bring to next visit. Call office as soon as possible if BP's over 140/90 on multiple occasions or with symptoms of dizziness, chest pain, shortness of breath, headache or ankle swelling. Our goal is to normalize the blood pressure to decrease the risks of strokes and heart attacks. The patient is in agreement with the plan.       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 (HCC) E27.6    Dyslipidemia E78.5    Chronic systolic congestive heart failure (HCC) I50.22    Statin intolerance Z78.9     Past Surgical History:   Procedure Laterality Date    HX HERNIA REPAIR      left inguinal x 2    RI CARDIAC SURG PROCEDURE UNLIST       Social History     Socioeconomic History    Marital status:      Spouse name: Not on file    Number of children: Not on file    Years of education: Not on file    Highest education level: Not on file   Occupational History    Not on file   Social Needs    Financial resource strain: Not on file    Food insecurity     Worry: Not on file     Inability: Not on file    Transportation needs     Medical: Not on file     Non-medical: Not on file   Tobacco Use    Smoking status: Never Smoker    Smokeless tobacco: Never Used   Substance and Sexual Activity    Alcohol use: No    Drug use: No    Sexual activity: Not on file   Lifestyle    Physical activity     Days per week: Not on file     Minutes per session: Not on file    Stress: Not on file   Relationships    Social connections     Talks on phone: Not on file     Gets together: Not on file     Attends Judaism service: Not on file     Active member of club or organization: Not on file     Attends meetings of clubs or organizations: Not on file     Relationship status: Not on file    Intimate partner violence     Fear of current or ex partner: Not on file     Emotionally abused: Not on file     Physically abused: Not on file     Forced sexual activity: Not on file   Other Topics Concern    Not on file   Social History Narrative    Not on file     Family History   Problem Relation Age of Onset    Cancer Father         GI    Hypertension Mother     Diabetes Mother     Cancer Sister         breast     Current Outpatient Medications   Medication Sig    rosuvastatin (CRESTOR) 10 mg tablet TAKE 1 TABLET DAILY    glucose blood VI test strips (FREESTYLE LITE STRIPS) strip USE TO TEST ONCE DAILY    FREESTYLE LANCETS 28 gauge misc USE TO TEST DAILY    aspirin delayed-release 81 mg tablet Take  by mouth daily.  Cholecalciferol, Vitamin D3, (VITAMIN D3) 1,000 unit cap Take  by mouth daily.  fish oil-dha-epa (FISH OIL) 1,200-144-216 mg Cap Take  by mouth.  VITAMIN B COMPLEX (B COMPLEX PO) Take  by mouth.  PV W-O MIKO/FERROUS FUMARATE/FA (M-VIT PO) Take  by mouth.  levothyroxine (SYNTHROID) 50 mcg tablet TAKE 1 TABLET DAILY BEFORE BREAKFAST    carvediloL (COREG) 3.125 mg tablet TAKE 1 TABLET TWICE A DAY WITH MEALS    trandolapriL (MAVIK) 2 mg tablet TAKE 1 TABLET NIGHTLY FOR LEFT VENTRICULAR DYSFUNCTION FOLLOWING MI    varicella-zoster (SHINGRIX) injection Shingrix, one injection now and repeat in 4-6 months. To be administered at Pharmacy. Fax confirmation to me at [phone]. No current facility-administered medications for this visit.       Allergies   Allergen Reactions    Pcn [Penicillins] Rash and Swelling    Robitussin [Guaifenesin] Hives       Review of Systems:  Constitutional: Negative for fatigue, malaise  Resp: Negative for cough, wheezing or SOB  CV: Negative for chest pain, dizziness or palpitations  GI: Negative for nausea or abdominal pain  MS: Negative for acute myalgias or arthralgias   Neuro: Negative for HA, weakness or paresthesia  Psych: Negative for depression or anxiety     Vitals:    03/16/21 1004   BP: 127/84   Pulse: 68   Resp: 16   Temp: (!) 96.6 °F (35.9 °C)   TempSrc: Temporal   SpO2: 97%   Weight: 182 lb 12.8 oz (82.9 kg)   Height: 5' 6\" (1.676 m)       Physical Examination:  General: Well developed, well nourished, in no acute distress  Head: Normocephalic, atraumatic  Eyes: Sclera clear, EOMI  Neck: Normal range of motion  Respiratory: symmetrical, unlabored effort  Cardiovascular: Regular rate and rhythm  Extremities: Full range of motion, normal gait  Feet: sensation intact, 1+ pedal pulses  Neurologic: No focal deficits  Psych: Active, alert and oriented. Affect appropriate       ICD-10-CM ICD-9-CM    1. 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 Chronic systolic congestive heart failure ([facility])  J60.74 587.96 METABOLIC PANEL, COMPREHENSIVE     428.0 CBC W/O DIFF      CBC W/O DIFF      METABOLIC PANEL, COMPREHENSIVE   3. Pure hypercholesterolemia  E78.00 272.0 LIPID PANEL      METABOLIC PANEL, COMPREHENSIVE      METABOLIC PANEL, COMPREHENSIVE      LIPID PANEL   4. Hypothyroidism due to acquired atrophy of thyroid  E03.4 244.8 TSH 3RD GENERATION     246.8 T4, FREE      T4, FREE      TSH 3RD GENERATION   5. 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 Prostate cancer ([facility]  C61 185        Plan of care:  Diagnoses were discussed in detail with patient. Urologist notes reviewed. Medication risks/benefits/side effects discussed with patient. Continue current prescribed medications as written. All of the patient's questions were addressed and answered to apparent satisfaction. The patient understands and agrees with our plan of care. The patient knows to call back if they have questions about the plan of care or if symptoms change.   The patient received an After-Visit Summary which contains VS, diagnoses, orders, allergy and medication lists. Future Appointments   Date Time Provider Sarina Richards   6/16/2021 11:20 AM Flor Ragland MD [facility] BS AMB           Follow-up and Dispositions    · Return in about 6 months (around 9/16/2021), or if symptoms worsen or fail to improve. The following Annual Medicare Wellness Exam is distinct and separate from the medical evaluation and decision making. This is the Subsequent Medicare Annual Wellness Exam, performed 12 months or more after the Initial AWV or the last Subsequent AWV    I have reviewed the patient's medical history in detail and updated the computerized patient record. Depression Risk Factor Screening:     3 most recent PHQ Screens 3/16/2021   Little interest or pleasure in doing things Not at all   Feeling down, depressed, irritable, or hopeless Not at all   Total Score PHQ 2 0       Alcohol Risk Screen    Do you average more than 1 drink per night or more than 7 drinks a week: No    In the past three months have you have had more than 4 drinks containing alcohol on one occasion: No        Functional Ability and Level of Safety:    Hearing: Hearing is good. Activities of Daily Living: The home contains: no safety equipment. Patient does total self care      Ambulation: with no difficulty     Fall Risk:  Fall Risk Assessment, last 12 mths 3/16/2021   Able to walk? Yes   Fall in past 12 months? 0   Do you feel unsteady? 0   Are you worried about falling 0      Abuse Screen:  Patient is not abused       Cognitive Screening    Has your family/caregiver stated any concerns about your memory: no       Assessment/Plan   Education and counseling provided:  Are appropriate based on today's review and evaluation  End-of-Life planning (with patient's consent)    Diagnoses and all orders for this visit:    1.  Medicare annual wellness visit, subsequent          Health Maintenance Due     Health Maintenance Due   Topic Date Due    Medicare Yearly Exam  12/20/2019  Eye Exam Retinal or Dilated  01/21/2021       Patient Care Team   Patient Care Team:  Christiano Gill MD as PCP - General (Family Medicine)  Christiano Gill MD as PCP - 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 ([facility] [address] D34.4    Dyslipidemia E78.5    Chronic systolic congestive heart failure (HCC) I50.22    Statin intolerance Z78.9     Past Medical History:   Diagnosis Date    Cardiomyopathy, dilated, nonischemic ([facility] [address]     Colon polyps     Hypercholesterolemia     Prostate cancer ([address]     [place], watch and wait strategy    [facility])     T2DM (type 2 diabetes mellitus) ([facility] [address] 8/21/2013      Past Surgical History:   Procedure Laterality Date    HX HERNIA REPAIR      left inguinal x 2    SD CARDIAC SURG PROCEDURE UNLIST       Current Outpatient Medications   Medication Sig Dispense Refill    rosuvastatin (CRESTOR) 10 mg tablet TAKE 1 TABLET DAILY 90 Tab 3    glucose blood VI test strips (FREESTYLE LITE STRIPS) strip USE TO TEST ONCE DAILY 100 Strip 5    FREESTYLE LANCETS 28 gauge misc USE TO TEST DAILY 100 Lancet 4    aspirin delayed-release 81 mg tablet Take  by mouth daily.  Cholecalciferol, Vitamin D3, (VITAMIN D3) 1,000 unit cap Take  by mouth daily.  fish oil-dha-epa (FISH OIL) 1,200-144-216 mg Cap Take  by mouth.  VITAMIN B COMPLEX (B COMPLEX PO) Take  by mouth.       PV W-O MIKO/FERROUS FUMARATE/FA (M-VIT PO) Take  by mouth.  levothyroxine (SYNTHROID) 50 mcg tablet TAKE 1 TABLET DAILY BEFORE BREAKFAST 90 Tab 1    carvediloL (COREG) 3.125 mg tablet TAKE 1 TABLET TWICE A DAY WITH MEALS 180 Tab 1    trandolapriL (MAVIK) 2 mg tablet TAKE 1 TABLET NIGHTLY FOR LEFT VENTRICULAR DYSFUNCTION FOLLOWING MI 90 Tab 1    varicella-zoster (SHINGRIX) injection Shingrix, one injection now and repeat in 4-6 months. To be administered at Pharmacy. Fax confirmation to me at [phone]. 2 Each 0     Allergies   Allergen Reactions    Pcn [Penicillins] Rash and Swelling    Robitussin [Guaifenesin] Hives       Family History   Problem Relation Age of Onset    Cancer Father         GI    Hypertension Mother     Diabetes Mother    David Loser Cancer Sister         breast     Social History     Tobacco Use    Smoking status: Never Smoker    Smokeless tobacco: Never Used   Substance Use Topics    Alcohol use:  No

## 2021-03-17 LAB
ALBUMIN SERPL-MCNC: 4.4 G/DL (ref 3.5–5)
ALBUMIN/GLOB SERPL: 1.4 {RATIO} (ref 1.1–2.2)
ALP SERPL-CCNC: 64 U/L (ref 45–117)
ALT SERPL-CCNC: 29 U/L (ref 12–78)
ANION GAP SERPL CALC-SCNC: 5 MMOL/L (ref 5–15)
AST SERPL-CCNC: 14 U/L (ref 15–37)
BILIRUB SERPL-MCNC: 0.7 MG/DL (ref 0.2–1)
BUN SERPL-MCNC: 17 MG/DL (ref 6–20)
BUN/CREAT SERPL: 17 (ref 12–20)
CALCIUM SERPL-MCNC: 9.5 MG/DL (ref 8.5–10.1)
CHLORIDE SERPL-SCNC: 102 MMOL/L (ref 97–108)
CHOLEST SERPL-MCNC: 227 MG/DL
CO2 SERPL-SCNC: 30 MMOL/L (ref 21–32)
COMMENT, HOLDF: NORMAL
CREAT SERPL-MCNC: 1.02 MG/DL (ref 0.7–1.3)
ERYTHROCYTE [DISTWIDTH] IN BLOOD BY AUTOMATED COUNT: 12.2 % (ref 11.5–14.5)
EST. AVERAGE GLUCOSE BLD GHB EST-MCNC: 120 MG/DL
GLOBULIN SER CALC-MCNC: 3.1 G/DL (ref 2–4)
GLUCOSE SERPL-MCNC: 107 MG/DL (ref 65–100)
HBA1C MFR BLD: 5.8 % (ref 4–5.6)
HCT VFR BLD AUTO: 46.5 % (ref 36.6–50.3)
HDLC SERPL-MCNC: 49 MG/DL
HDLC SERPL: 4.6 {RATIO} (ref 0–5)
HGB BLD-MCNC: 15.2 G/DL (ref 12.1–17)
LDLC SERPL CALC-MCNC: 151 MG/DL (ref 0–100)
LIPID PROFILE,FLP: ABNORMAL
MCH RBC QN AUTO: 32.3 PG (ref 26–34)
MCHC RBC AUTO-ENTMCNC: 32.7 G/DL (ref 30–36.5)
MCV RBC AUTO: 98.9 FL (ref 80–99)
NRBC # BLD: 0 K/UL (ref 0–0.01)
NRBC BLD-RTO: 0 PER 100 WBC
PLATELET # BLD AUTO: 209 K/UL (ref 150–400)
PMV BLD AUTO: 11.2 FL (ref 8.9–12.9)
POTASSIUM SERPL-SCNC: 4.8 MMOL/L (ref 3.5–5.1)
PROT SERPL-MCNC: 7.5 G/DL (ref 6.4–8.2)
RBC # BLD AUTO: 4.7 M/UL (ref 4.1–5.7)
SAMPLES BEING HELD,HOLD: NORMAL
SODIUM SERPL-SCNC: 137 MMOL/L (ref 136–145)
T4 FREE SERPL-MCNC: 1 NG/DL (ref 0.8–1.5)
TRIGL SERPL-MCNC: 135 MG/DL (ref ?–150)
TSH SERPL DL<=0.05 MIU/L-ACNC: 2.47 UIU/ML (ref 0.36–3.74)
VLDLC SERPL CALC-MCNC: 27 MG/DL
WBC # BLD AUTO: 7.3 K/UL (ref 4.1–11.1)

## 2021-03-18 RX ORDER — TRANDOLAPRIL 2 MG/1
TABLET ORAL
Qty: 90 TAB | Refills: 1 | Status: SHIPPED | OUTPATIENT
Start: 2021-03-18 | End: 2021-09-07 | Stop reason: SDUPTHER

## 2021-03-18 RX ORDER — CARVEDILOL 3.12 MG/1
TABLET ORAL
Qty: 180 TAB | Refills: 1 | Status: SHIPPED | OUTPATIENT
Start: 2021-03-18 | End: 2021-09-07 | Stop reason: SDUPTHER

## 2021-03-18 RX ORDER — LEVOTHYROXINE SODIUM 50 UG/1
TABLET ORAL
Qty: 90 TAB | Refills: 1 | Status: SHIPPED | OUTPATIENT
Start: 2021-03-18 | End: 2021-09-07 | Stop reason: SDUPTHER

## 2021-03-22 NOTE — PATIENT INSTRUCTIONS
Medicare Wellness Visit, Male The best way to live healthy is to have a lifestyle where you eat a well-balanced diet, exercise regularly, limit alcohol use, and quit all forms of tobacco/nicotine, if applicable. Regular preventive services are another way to keep healthy. Preventive services (vaccines, screening tests, monitoring & exams) can help personalize your care plan, which helps you manage your own care. Screening tests can find health problems at the earliest stages, when they are easiest to treat. [facility] follows the current, evidence-based guidelines published by the [facility] ([facility] [[facility]STF) when recommending preventive services for our patients. Because we follow these guidelines, sometimes recommendations change over time as research supports it. (For example, a prostate screening blood test is no longer routinely recommended for men with no symptoms). Of course, you and your doctor may decide to screen more often for some diseases, based on your risk and co-morbidities (chronic disease you are already diagnosed with). Preventive services for you include: - Medicare offers their members a free annual wellness visit, which is time for you and your primary care provider to discuss and plan for your preventive service needs. Take advantage of this benefit every year! 
-All adults over age 72 should receive the recommended pneumonia vaccines. Current USPSTF guidelines recommend a series of two vaccines for the best pneumonia protection.  
-All adults should have a flu vaccine yearly and tetanus vaccine every 10 years. 
-All adults age 48 and older should receive the shingles vaccines (series of two vaccines).       
-All adults age 38-68 who are overweight should have a diabetes screening test once every three years.  
-Other screening tests & preventive services for persons with diabetes include: an eye exam to screen for diabetic retinopathy, a kidney function test, a foot exam, and stricter control over your cholesterol.  
-Cardiovascular screening for adults with routine risk involves an electrocardiogram (ECG) at intervals determined by the provider.  
-Colorectal cancer screening should be done for adults age 54-65 with no increased risk factors for colorectal cancer. There are a number of acceptable methods of screening for this type of cancer. Each test has its own benefits and drawbacks. Discuss with your provider what is most appropriate for you during your annual wellness visit. The different tests include: colonoscopy (considered the best screening method), a fecal occult blood test, a fecal DNA test, and sigmoidoscopy. 
-All adults born between [facility] should be screened once for Hepatitis C. 
-An Abdominal Aortic Aneurysm (AAA) Screening is recommended for men age 73-68 who has ever smoked in their lifetime. Here is a list of your current Health Maintenance items (your personalized list of preventive services) with a due date: 
Health Maintenance Due Topic Date Due  
 Annual Well Visit  12/20/2019

## 2021-06-16 ENCOUNTER — OFFICE VISIT (OUTPATIENT)
Dept: CARDIOLOGY CLINIC | Age: 81
End: 2021-06-16
Payer: MEDICARE

## 2021-06-16 VITALS
RESPIRATION RATE: 15 BRPM | SYSTOLIC BLOOD PRESSURE: 118 MMHG | BODY MASS INDEX: 29.09 KG/M2 | HEIGHT: 66 IN | DIASTOLIC BLOOD PRESSURE: 70 MMHG | HEART RATE: 70 BPM | WEIGHT: 181 LBS | OXYGEN SATURATION: 97 %

## 2021-06-16 DIAGNOSIS — I50.22 CHRONIC SYSTOLIC CONGESTIVE HEART FAILURE (HCC): ICD-10-CM

## 2021-06-16 DIAGNOSIS — Z78.9 STATIN INTOLERANCE: ICD-10-CM

## 2021-06-16 DIAGNOSIS — E78.5 DYSLIPIDEMIA: ICD-10-CM

## 2021-06-16 DIAGNOSIS — E13.8: ICD-10-CM

## 2021-06-16 DIAGNOSIS — I25.5 ISCHEMIC CARDIOMYOPATHY: Primary | ICD-10-CM

## 2021-06-16 PROCEDURE — G8427 DOCREV CUR MEDS BY ELIG CLIN: HCPCS | Performed by: SPECIALIST

## 2021-06-16 PROCEDURE — 93005 ELECTROCARDIOGRAM TRACING: CPT | Performed by: SPECIALIST

## 2021-06-16 PROCEDURE — 1101F PT FALLS ASSESS-DOCD LE1/YR: CPT | Performed by: SPECIALIST

## 2021-06-16 PROCEDURE — 93010 ELECTROCARDIOGRAM REPORT: CPT | Performed by: SPECIALIST

## 2021-06-16 PROCEDURE — G0463 HOSPITAL OUTPT CLINIC VISIT: HCPCS | Performed by: SPECIALIST

## 2021-06-16 PROCEDURE — 99214 OFFICE O/P EST MOD 30 MIN: CPT | Performed by: SPECIALIST

## 2021-06-16 PROCEDURE — G8419 CALC BMI OUT NRM PARAM NOF/U: HCPCS | Performed by: SPECIALIST

## 2021-06-16 PROCEDURE — G8536 NO DOC ELDER MAL SCRN: HCPCS | Performed by: SPECIALIST

## 2021-06-16 PROCEDURE — G8432 DEP SCR NOT DOC, RNG: HCPCS | Performed by: SPECIALIST

## 2021-06-16 NOTE — PROGRESS NOTES
Cyn Ray     1940       Ting Fernandez MD, [facility]  Date of Visit-6/16/2021   PCP is Magali Calles MD   [facility] [place]  [facility]  HPI:  Cyn Ray is a 79 y.o. male   Pt with below and switching Cardiologists  · Reduced EF, CHF with Ischemic CMY  · MRI 2014 showed RCA infarct. He had dense subendocardial infarct in the basal and mid inferolateral and lateral walls and MRI EF was 42%;  he has declined ICD. Echo 2013 showed EF 30-35% global hypokinesia stress echo showed poor exercise capacity in a cath 9/24/2013 showed mild plaque with an EDP of 16 and no AV gradient  · echo in December 2020 showed EF 30-35%. · XOL    12/11/20 ECHO ADULT COMPLETE   Interpretation Summary  · LV: Estimated LVEF is 30 - 35%. Mildly dilated left ventricle. Mild septal wall hypertrophy. Mild posterior wall hypertrophy. Moderate-to-severely reduced systolic function. Inconclusive left ventricular diastolic function. · RV: Mildly reduced systolic function. · AO: Mild ascending aorta dilatation. Ascending aorta diameter = 4.3 cm. · AV: Moderate aortic valve sclerosis with no significant stenosis. · MV: Mild mitral valve regurgitation is present. · PA: Pulmonary arterial systolic pressure is 29 mmHg. Overall the pt states he is doing well. He reports that he feels a sense of \"vertigo\" when he lays down, and has experienced it his entire life. He reports he works about 7 hours a day of physical work. Pt is a  on a racing crew. Seems very active person  Denies chest pain, edema, syncope or shortness of breath at rest, has no tachycardia, palpitations or sense of arrhythmia. EKG:  Sinus  Rhythm  -First degree A-V block  - frequent multiform ectopic ventricular beats   [place] = 230  # VECs = 2, # types 2  -Old anterior infarct.    -  Nonspecific T-abnormality. Assessment/Plan:     1. Ischemic cardiomyopathy  Prior infarct  In the RCA.  EF is unchanged and remains NYHA 1. No changes in current medications. It is entering that he really did have other plaque so he may have had a clot in that area that led to the infarction. Continue aspirin statin beta-blocker  2. Chronic systolic congestive heart failure (HCC)  Compensated. EF 35% NYHA I  Continue Coreg 3.125 mg twice daily and Mavik 2 mg daily  Has PVCs but declines AICD  3. Dyslipidemia  On Crestor, not sure if he is compliant LDL seems high has been as low as 63 before he is now back on medication  At goal , denies excess muscle aches or new liver issues  Key Antihyperlipidemia Meds             rosuvastatin (CRESTOR) 10 mg tablet (Taking) TAKE 1 TABLET DAILY    fish oil-dha-epa (FISH OIL) 1,200-144-216 mg Cap (Taking) Take  by mouth. Lab Results   Component Value Date/Time    LDL, calculated 151 (H) 03/16/2021 11:10 AM       4. Statin intolerance  5. 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 Future Appointments   Date Time Provider Sarina Richards   12/8/2021 10:00 AM Ting Hairston MD [facility] BS AMB       F/u 6 months in [place]     Impression:   1. Ischemic cardiomyopathy    2. Chronic systolic congestive heart failure ([address]    3. Dyslipidemia    4. Statin intolerance    5.  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    Echo 5/15/2014 - EF 30-35%, global HK    Cardiac MRI 6/2014 - Dense medium size subendocardial infarct of the basal and mid   inferolateral and lateral walls (primarily mid segments) with myocardial   wall thinning and fatty metaplasia of the infarct suggest chronic non   reperfusing infarct. No significant viability in these segments. All other   segments are completely viable. LVEF 42%. Echo 1/5/15 - EF 30-35%. inferior and inferolateral akinesis, mild MR    Echo 5/24/18 - EF 35-40%. Mod HK of basal inferior walls. Mild LVH. Mild TR. AoV sclerosis without stenosis. Aortic root mild dilatation; ascending aorta 3.9cm. Improvement in LF function when compared to study 1/2015. Echo 12/9/19 - EF 30-35%, severe inferior hypokinesis     Future Appointments   Date Time Provider Sarina Richards   12/8/2021 10:00 AM Ting Hairston MD [facility] BS AMB        ROS-except as noted above. . A complete cardiac and respiratory are reviewed and negative except as above ; Resp-denies wheezing  or productive cough,. Const- No unusual weight loss or fever; Neuro-no recent seizure or CVA ; GI- No BRBPR, abdom pain, bloating ; - no  hematuria   see supplement sheet, initialed and to be scanned by staff  Past Medical History:   Diagnosis Date    Cardiomyopathy, dilated, nonischemic ([facility]     Colon polyps     Hypercholesterolemia     Prostate cancer ([facility] [facility]     [place]iel[place] 6, watch and wait strategy    [facility] spotted fever)     T2DM (type 2 diabetes mellitus) ([facility] 8/21/2013      Social Hx= reports that he has never smoked. He has never used smokeless tobacco. He reports that he does not drink alcohol and does not use drugs. Exam and Labs:  /70 (BP 1 Location: Left arm, BP Patient Position: Sitting)   Pulse 70   Resp 15   Ht 5' 6\" (1.676 m)   Wt 181 lb (82.1 kg)   SpO2 97%   BMI 29.21 kg/m² Constitutional:  NAD, comfortable  Head: NC,AT. Eyes: No scleral icterus. Neck:  Neck supple. No JVD present. Throat: moist mucous membranes. Chest: Effort normal & normal respiratory excursion . Neurological: alert, conversant and oriented . Skin: Skin is not cold. No obvious systemic rash noted. Not diaphoretic. No erythema. Psychiatric:  Grossly normal mood and affect. Behavior appears normal. Extremities:  no clubbing or cyanosis. Abdomen: non distended    Lungs:breath sounds normal. No stridor. distress, wheezes or  Rales. Heart: normal rate, regular rhythm, normal S1, S2, no murmurs, rubs, clicks or gallops , PMI non displaced. Edema: Edema is none.   Lab Results   Component Value Date/Time    Cholesterol, total 227 (H) 03/16/2021 11:10 AM    HDL Cholesterol 49 03/16/2021 11:10 AM    LDL, calculated 151 (H) 03/16/2021 11:10 AM    Triglyceride 135 03/16/2021 11:10 AM    CHOL/HDL Ratio 4.6 03/16/2021 11:10 AM     Lab Results   Component Value Date/Time    Sodium 137 03/16/2021 11:10 AM    Potassium 4.8 03/16/2021 11:10 AM    Chloride 102 03/16/2021 11:10 AM    CO2 30 03/16/2021 11:10 AM    Anion gap 5 03/16/2021 11:10 AM    Glucose 107 (H) 03/16/2021 11:10 AM    BUN 17 03/16/2021 11:10 AM    Creatinine 1.02 03/16/2021 11:10 AM    BUN/Creatinine ratio 17 03/16/2021 11:10 AM    GFR est AA >60 03/16/2021 11:10 AM    GFR est non-AA >60 03/16/2021 11:10 AM    Calcium 9.5 03/16/2021 11:10 AM      Wt Readings from Last 3 Encounters:   06/16/21 181 lb (82.1 kg)   03/16/21 182 lb 12.8 oz (82.9 kg)   12/11/20 183 lb (83 kg)      BP Readings from Last 3 Encounters:   06/16/21 118/70   03/16/21 127/84   12/11/20 116/76      Current Outpatient Medications   Medication Sig    levothyroxine (SYNTHROID) 50 mcg tablet TAKE 1 TABLET DAILY BEFORE BREAKFAST    carvediloL (COREG) 3.125 mg tablet TAKE 1 TABLET TWICE A DAY WITH MEALS    trandolapriL (MAVIK) 2 mg tablet TAKE 1 TABLET NIGHTLY FOR LEFT VENTRICULAR DYSFUNCTION FOLLOWING MI    rosuvastatin (CRESTOR) 10 mg tablet TAKE 1 TABLET DAILY    glucose blood VI test strips (FREESTYLE LITE STRIPS) strip USE TO TEST ONCE DAILY    FREESTYLE LANCETS 28 gauge misc USE TO TEST DAILY    aspirin delayed-release 81 mg tablet Take  by mouth daily.  Cholecalciferol, Vitamin D3, (VITAMIN D3) 1,000 unit cap Take  by mouth daily.  fish oil-dha-epa (FISH OIL) 1,200-144-216 mg Cap Take  by mouth.  VITAMIN B COMPLEX (B COMPLEX PO) Take  by mouth.  PV W-O MIKO/FERROUS FUMARATE/FA (M-VIT PO) Take  by mouth.  varicella-zoster [facility]) injection Shingrix, one injection now and repeat in 4-6 months. To be administered at Pharmacy. Fax confirmation to me at [phone]. (Patient not taking: Reported on 6/16/2021)     No current facility-administered medications for this visit. Impression see above.       Written by Trisha Chiu, as dictated by Anny Arreaga MD.

## 2021-06-16 NOTE — PROGRESS NOTES
Visit Vitals  /70 (BP 1 Location: Left arm, BP Patient Position: Sitting)   Pulse 70   Resp 15   Ht 5' 6\" (1.676 m)   Wt 181 lb (82.1 kg)   SpO2 97%   BMI 29.21 kg/m²

## 2021-06-16 NOTE — Clinical Note
6/16/2021 Patient: Sekou Menezes YOB: 1940 Date of Visit: 6/16/2021 Ronak Corral MD 
[address] [address] [address] [employer] Dear Ronak Corral MD, Thank you for referring Mr. Misael Mann to [address]  for evaluation. My notes for this consultation are attached. If you have questions, please do not hesitate to call me. I look forward to following your patient along with you.  
 
 
Sincerely, 
 
Josh Brennan MD

## 2021-09-10 RX ORDER — TRANDOLAPRIL 2 MG/1
TABLET ORAL
Qty: 90 TABLET | Refills: 0 | Status: SHIPPED | OUTPATIENT
Start: 2021-09-10 | End: 2022-02-02

## 2021-09-10 RX ORDER — LEVOTHYROXINE SODIUM 50 UG/1
TABLET ORAL
Qty: 90 TABLET | Refills: 0 | Status: SHIPPED | OUTPATIENT
Start: 2021-09-10 | End: 2022-02-02

## 2021-09-10 RX ORDER — CARVEDILOL 3.12 MG/1
TABLET ORAL
Qty: 180 TABLET | Refills: 0 | Status: SHIPPED | OUTPATIENT
Start: 2021-09-10 | End: 2022-02-02

## 2021-09-22 ENCOUNTER — OFFICE VISIT (OUTPATIENT)
Dept: FAMILY MEDICINE CLINIC | Age: 81
End: 2021-09-22
Payer: MEDICARE

## 2021-09-22 VITALS
TEMPERATURE: 97.5 F | SYSTOLIC BLOOD PRESSURE: 130 MMHG | WEIGHT: 184.6 LBS | BODY MASS INDEX: 29.67 KG/M2 | HEART RATE: 76 BPM | OXYGEN SATURATION: 95 % | HEIGHT: 66 IN | DIASTOLIC BLOOD PRESSURE: 78 MMHG | RESPIRATION RATE: 18 BRPM

## 2021-09-22 DIAGNOSIS — E78.00 PURE HYPERCHOLESTEROLEMIA: ICD-10-CM

## 2021-09-22 DIAGNOSIS — I50.22 CHRONIC SYSTOLIC CONGESTIVE HEART FAILURE (HCC): ICD-10-CM

## 2021-09-22 DIAGNOSIS — E11.9 DIABETES MELLITUS TYPE 2, DIET-CONTROLLED (HCC): Primary | ICD-10-CM

## 2021-09-22 DIAGNOSIS — C61 PROSTATE CANCER (HCC): ICD-10-CM

## 2021-09-22 DIAGNOSIS — E03.4 HYPOTHYROIDISM DUE TO ACQUIRED ATROPHY OF THYROID: ICD-10-CM

## 2021-09-22 PROCEDURE — G8536 NO DOC ELDER MAL SCRN: HCPCS | Performed by: FAMILY MEDICINE

## 2021-09-22 PROCEDURE — 1101F PT FALLS ASSESS-DOCD LE1/YR: CPT | Performed by: FAMILY MEDICINE

## 2021-09-22 PROCEDURE — G8419 CALC BMI OUT NRM PARAM NOF/U: HCPCS | Performed by: FAMILY MEDICINE

## 2021-09-22 PROCEDURE — G8510 SCR DEP NEG, NO PLAN REQD: HCPCS | Performed by: FAMILY MEDICINE

## 2021-09-22 PROCEDURE — 99214 OFFICE O/P EST MOD 30 MIN: CPT | Performed by: FAMILY MEDICINE

## 2021-09-22 PROCEDURE — G8427 DOCREV CUR MEDS BY ELIG CLIN: HCPCS | Performed by: FAMILY MEDICINE

## 2021-09-22 NOTE — PROGRESS NOTES
1. Have you been to the ER, urgent care clinic since your last visit? Hospitalized since your last visit? No    2. Have you seen or consulted any other health care providers outside of the [address] [place] [address] since your last visit? Include any pap smears or colon screening. No    Reviewed record in preparation for visit and have necessary documentation  Goals that were addressed and/or need to be completed during or after this appointment include     Health Maintenance Due   Topic Date Due    Eye Exam Retinal or Dilated  01/21/2021    DTaP/Tdap/Td series (2 - Td or Tdap) 05/17/2021    Flu Vaccine (1) 09/01/2021    A1C test (Diabetic or Prediabetic)  09/16/2021       Patient is accompanied by self I have received verbal consent from Ilya Vicente to discuss any/all medical information while they are present in the room.

## 2021-09-23 LAB
ALBUMIN SERPL-MCNC: 4.1 G/DL (ref 3.5–5)
ALBUMIN/GLOB SERPL: 1.3 {RATIO} (ref 1.1–2.2)
ALP SERPL-CCNC: 55 U/L (ref 45–117)
ALT SERPL-CCNC: 26 U/L (ref 12–78)
ANION GAP SERPL CALC-SCNC: 3 MMOL/L (ref 5–15)
AST SERPL-CCNC: 15 U/L (ref 15–37)
BILIRUB SERPL-MCNC: 0.8 MG/DL (ref 0.2–1)
BUN SERPL-MCNC: 18 MG/DL (ref 6–20)
BUN/CREAT SERPL: 18 (ref 12–20)
CALCIUM SERPL-MCNC: 9.7 MG/DL (ref 8.5–10.1)
CHLORIDE SERPL-SCNC: 104 MMOL/L (ref 97–108)
CHOLEST SERPL-MCNC: 181 MG/DL
CO2 SERPL-SCNC: 27 MMOL/L (ref 21–32)
CREAT SERPL-MCNC: 1.01 MG/DL (ref 0.7–1.3)
ERYTHROCYTE [DISTWIDTH] IN BLOOD BY AUTOMATED COUNT: 12.8 % (ref 11.5–14.5)
EST. AVERAGE GLUCOSE BLD GHB EST-MCNC: 123 MG/DL
GLOBULIN SER CALC-MCNC: 3.1 G/DL (ref 2–4)
GLUCOSE SERPL-MCNC: 121 MG/DL (ref 65–100)
HBA1C MFR BLD: 5.9 % (ref 4–5.6)
HCT VFR BLD AUTO: 48.5 % (ref 36.6–50.3)
HDLC SERPL-MCNC: 42 MG/DL
HDLC SERPL: 4.3 {RATIO} (ref 0–5)
HGB BLD-MCNC: 15.2 G/DL (ref 12.1–17)
LDLC SERPL CALC-MCNC: 112.4 MG/DL (ref 0–100)
MAGNESIUM SERPL-MCNC: 2.2 MG/DL (ref 1.6–2.4)
MCH RBC QN AUTO: 32.3 PG (ref 26–34)
MCHC RBC AUTO-ENTMCNC: 31.3 G/DL (ref 30–36.5)
MCV RBC AUTO: 103.2 FL (ref 80–99)
NRBC # BLD: 0 K/UL (ref 0–0.01)
NRBC BLD-RTO: 0 PER 100 WBC
PLATELET # BLD AUTO: 196 K/UL (ref 150–400)
PMV BLD AUTO: 11.4 FL (ref 8.9–12.9)
POTASSIUM SERPL-SCNC: 4.4 MMOL/L (ref 3.5–5.1)
PROT SERPL-MCNC: 7.2 G/DL (ref 6.4–8.2)
RBC # BLD AUTO: 4.7 M/UL (ref 4.1–5.7)
SODIUM SERPL-SCNC: 134 MMOL/L (ref 136–145)
TRIGL SERPL-MCNC: 133 MG/DL (ref ?–150)
TSH SERPL DL<=0.05 MIU/L-ACNC: 2.56 UIU/ML (ref 0.36–3.74)
VLDLC SERPL CALC-MCNC: 26.6 MG/DL
WBC # BLD AUTO: 8 K/UL (ref 4.1–11.1)

## 2021-09-23 NOTE — PROGRESS NOTES
Progress Note    Patient: Aurelia Armenta MRN: [id number]  SSN: [id number]    YOB: 1940  Age: [de-identified] y.o. Sex: male        Chief Complaint   Patient presents with    Follow Up Chronic Condition    Labs     he is a [de-identified]y.o. year old male who presents for follow up of chronic health conditions. PAtient with hx of T2D, hypothyroidism, HLD and CHF. He is due for lab work. Patient denies HA, dizziness, SOB, CP, abdominal pain, dysuria, acute myalgias or arthralgias. Encounter Diagnoses   Name Primary?  Diabetes mellitus type 2, diet-controlled ([facility] Yes    Hypothyroidism due to acquired atrophy of thyroid     Pure hypercholesterolemia     Chronic systolic congestive heart failure (HCC)     Prostate cancer ([facility]      Diabetes: This patient is being treating under a comprehensive plan of care for diabetes. Overall the patient feels well with good energy level. Insulin dependence: no   Pertinent Labs:   Lab Results   Component Value Date/Time    Hemoglobin A1c 5.9 (H) 09/22/2021 10:35 AM      Body mass index is 29.8 kg/m². Lab Results   Component Value Date/Time    LDL, calculated 112.4 (H) 09/22/2021 10:35 AM        Wt Readings from Last 3 Encounters:   09/22/21 184 lb 9.6 oz (83.7 kg)   06/16/21 181 lb (82.1 kg)   03/16/21 182 lb 12.8 oz (82.9 kg)        Social History     Tobacco Use   Smoking Status Never Smoker   Smokeless Tobacco Never Used        Medications, diet and exercise as means of diabetic control with a goal of an A1C of less than 7.0% discussed. Diabetic foot care and annual eye exam discussed as well. Call immediately if having symptoms of high sugar (frequent urination, always thirsty) or low sugar (dizzy, lethargic, sweaty, nauseated, headache). Our overall goal is to reduce or eliminate the long term consequences of poorly controlled diabetes. Patient expresses understanding and agreement with our plan of care.       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 ([facility] E49.6    Dyslipidemia E78.5    Chronic systolic congestive heart failure (HCC) I50.22    Statin intolerance Z78.9     Past Surgical History:   Procedure Laterality Date    HX HERNIA REPAIR      left inguinal x 2    [place] CARDIAC SURG PROCEDURE UNLIST       Social History     Socioeconomic History    Marital status:      Spouse name: Not on file    Number of children: Not on file    Years of education: Not on file    Highest education level: Not on file   Occupational History    Not on file   Tobacco Use    Smoking status: Never Smoker    Smokeless tobacco: Never Used   Substance and Sexual Activity    Alcohol use: No    Drug use: No    Sexual activity: Not on file   Other Topics Concern    Not on file   Social History Narrative    Not on file     Social Determinants of Health     Financial Resource Strain:     Difficulty of Paying Living Expenses:    Food Insecurity:     Worried About Running Out of Food in the Last Year:     Ran Out of Food in the Last Year:    Transportation Needs:     Lack of Transportation (Medical):      Lack of Transportation (Non-Medical):    Physical Activity:     Days of Exercise per Week:     Minutes of Exercise per Session:    Stress:     Feeling of Stress :    Social Connections:     Frequency of Communication with Friends and Family:     Frequency of Social Gatherings with Friends and Family:     Attends Scientologist Services:     Active Member of Clubs or Organizations:     Attends Club or Organization Meetings:     Marital Status:    Intimate Partner Violence:     Fear of Current or Ex-Partner:     Emotionally Abused:     Physically Abused:     Sexually Abused:      Family History   Problem Relation Age of Onset    Cancer Father         GI    Hypertension Mother     Diabetes Mother     Cancer Sister         breast     Current Outpatient Medications   Medication Sig    levothyroxine (SYNTHROID) 50 mcg tablet TAKE 1 TABLET DAILY BEFORE BREAKFAST    trandolapriL (MAVIK) 2 mg tablet TAKE 1 TABLET NIGHTLY FOR LEFT VENTRICULAR DYSFUNCTION FOLLOWING MI    carvediloL (COREG) 3.125 mg tablet TAKE 1 TABLET TWICE A DAY WITH MEALS    glucose blood VI test strips (FREESTYLE LITE STRIPS) strip USE TO TEST ONCE DAILY    FREESTYLE LANCETS 28 gauge misc USE TO TEST DAILY    aspirin delayed-release 81 mg tablet Take  by mouth daily.  Cholecalciferol, Vitamin D3, (VITAMIN D3) 1,000 unit cap Take  by mouth daily.  VITAMIN B COMPLEX (B COMPLEX PO) Take  by mouth.  PV W-O MIKO/FERROUS FUMARATE/FA (M-VIT PO) Take  by mouth.  rosuvastatin (CRESTOR) 10 mg tablet TAKE 1 TABLET DAILY (Patient not taking: Reported on 9/22/2021)    varicella-zoster [facility]) injection Shingrix, one injection now and repeat in 4-6 months. To be administered at Pharmacy. Fax confirmation to me at [phone]. (Patient not taking: Reported on 6/16/2021)    fish oil-dha-epa (FISH OIL) 1,200-144-216 mg Cap Take  by mouth. (Patient not taking: Reported on 9/22/2021)     No current facility-administered medications for this visit.      Allergies   Allergen Reactions    Pcn [Penicillins] Rash and Swelling    Robitussin [Guaifenesin] Hives       Review of Systems:  Constitutional: Negative for fatigue, malaise  Resp: Negative for cough, wheezing or SOB  CV: Negative for chest pain, dizziness or palpitations  GI: Negative for nausea or abdominal pain  MS: Negative for acute myalgias or arthralgias   Neuro: Negative for HA, weakness or paresthesia  Psych: Negative for depression or anxiety     Vitals:    09/22/21 0920   BP: 130/78   Pulse: 76   Resp: 18   Temp: 97.5 °F (36.4 °C)   TempSrc: Oral   SpO2: 95%   Weight: 184 lb 9.6 oz (83.7 kg)   Height: 5' 6\" (1.676 m)       Physical Examination:  General: Well developed, well nourished, in no acute distress  Head: Normocephalic, atraumatic  Eyes: Sclera clear, EOMI  Neck: Normal range of motion  Respiratory: symmetrical, unlabored effort  Cardiovascular: Regular rate and rhythm  Extremities: Full range of motion, normal gait  Neurologic: No focal deficits  Psych: Active, alert and oriented. Affect appropriate       ICD-10-CM ICD-9-CM    1. Diabetes mellitus type 2, diet-controlled (HCC)  E11.9 250.00 MAGNESIUM      LIPID PANEL      METABOLIC PANEL, COMPREHENSIVE      CBC W/O DIFF      TSH 3RD GENERATION      HEMOGLOBIN A1C WITH EAG      HEMOGLOBIN A1C WITH EAG      TSH 3RD GENERATION      CBC W/O DIFF      METABOLIC PANEL, COMPREHENSIVE      LIPID PANEL      MAGNESIUM   2. Hypothyroidism due to acquired atrophy of thyroid  E03.4 244.8      246.8    3. Pure hypercholesterolemia  E78.00 272.0 LIPID PANEL      METABOLIC PANEL, COMPREHENSIVE      METABOLIC PANEL, COMPREHENSIVE      LIPID PANEL   4. Chronic systolic congestive heart failure (HCC)  I50.22 428.22 MAGNESIUM     428.0 LIPID PANEL      METABOLIC PANEL, COMPREHENSIVE      CBC W/O DIFF      TSH 3RD GENERATION      TSH 3RD GENERATION      CBC W/O DIFF      METABOLIC PANEL, COMPREHENSIVE      LIPID PANEL      MAGNESIUM   5. Prostate cancer ([facility]  C61 185        Plan of care:  Diagnoses were discussed in detail with patient. Medication risks/benefits/side effects discussed with patient. All of the patient's questions were addressed and answered to apparent satisfaction. The patient understands and agrees with our plan of care. The patient knows to call back if they have questions about the plan of care or if symptoms change.   The patient received an After-Visit Summary which contains VS, diagnoses, orders, allergy and medication lists. Future Appointments   Date Time Provider Sarina Richards   12/8/2021 10:00 AM MD DOMINIK Haines BS AMB           Follow-up and Dispositions    · Return in about 6 months (around 3/22/2022).

## 2021-10-06 ENCOUNTER — CLINICAL SUPPORT (OUTPATIENT)
Dept: FAMILY MEDICINE CLINIC | Age: 81
End: 2021-10-06
Payer: MEDICARE

## 2021-10-06 VITALS
HEART RATE: 72 BPM | BODY MASS INDEX: 29.7 KG/M2 | WEIGHT: 184 LBS | DIASTOLIC BLOOD PRESSURE: 65 MMHG | TEMPERATURE: 98.4 F | OXYGEN SATURATION: 94 % | SYSTOLIC BLOOD PRESSURE: 109 MMHG | RESPIRATION RATE: 16 BRPM

## 2021-10-06 DIAGNOSIS — Z23 ENCOUNTER FOR IMMUNIZATION: Primary | ICD-10-CM

## 2021-10-06 PROCEDURE — 90694 VACC AIIV4 NO PRSRV 0.5ML IM: CPT | Performed by: FAMILY MEDICINE

## 2021-10-06 PROCEDURE — G0008 ADMIN INFLUENZA VIRUS VAC: HCPCS | Performed by: FAMILY MEDICINE

## 2021-10-06 NOTE — PROGRESS NOTES
Did not evaluate patient directly. Patient tolerated injection well per nursing.     Les Huang MD  10/06/21

## 2022-02-11 RX ORDER — BLOOD-GLUCOSE METER
KIT MISCELLANEOUS
Qty: 100 STRIP | Refills: 0 | Status: SHIPPED | OUTPATIENT
Start: 2022-02-11 | End: 2022-05-04

## 2022-03-01 ENCOUNTER — OFFICE VISIT (OUTPATIENT)
Dept: FAMILY MEDICINE CLINIC | Age: 82
End: 2022-03-01
Payer: MEDICARE

## 2022-03-01 VITALS
OXYGEN SATURATION: 95 % | SYSTOLIC BLOOD PRESSURE: 128 MMHG | TEMPERATURE: 98.2 F | DIASTOLIC BLOOD PRESSURE: 73 MMHG | WEIGHT: 176.4 LBS | HEART RATE: 74 BPM | BODY MASS INDEX: 28.35 KG/M2 | RESPIRATION RATE: 18 BRPM | HEIGHT: 66 IN

## 2022-03-01 DIAGNOSIS — R68.89 OTHER GENERAL SYMPTOMS AND SIGNS: ICD-10-CM

## 2022-03-01 DIAGNOSIS — Z00.00 MEDICARE ANNUAL WELLNESS VISIT, SUBSEQUENT: ICD-10-CM

## 2022-03-01 DIAGNOSIS — E11.9 DIABETES MELLITUS TYPE 2, DIET-CONTROLLED (HCC): Primary | ICD-10-CM

## 2022-03-01 DIAGNOSIS — E78.00 PURE HYPERCHOLESTEROLEMIA: ICD-10-CM

## 2022-03-01 DIAGNOSIS — I50.22 CHRONIC SYSTOLIC CONGESTIVE HEART FAILURE (HCC): ICD-10-CM

## 2022-03-01 DIAGNOSIS — E03.4 HYPOTHYROIDISM DUE TO ACQUIRED ATROPHY OF THYROID: ICD-10-CM

## 2022-03-01 DIAGNOSIS — C61 PROSTATE CANCER (HCC): ICD-10-CM

## 2022-03-01 DIAGNOSIS — E11.3592 CONTROLLED TYPE 2 DIABETES MELLITUS WITH PROLIFERATIVE RETINOPATHY OF LEFT EYE, WITHOUT LONG-TERM CURRENT USE OF INSULIN, MACULAR EDEMA PRESENCE UNSPECIFIED, UNSPECIFIED PROLIFERATIVE RETINOPAT* (HCC): ICD-10-CM

## 2022-03-01 PROCEDURE — G8419 CALC BMI OUT NRM PARAM NOF/U: HCPCS | Performed by: FAMILY MEDICINE

## 2022-03-01 PROCEDURE — 99214 OFFICE O/P EST MOD 30 MIN: CPT | Performed by: FAMILY MEDICINE

## 2022-03-01 PROCEDURE — 1101F PT FALLS ASSESS-DOCD LE1/YR: CPT | Performed by: FAMILY MEDICINE

## 2022-03-01 PROCEDURE — G8510 SCR DEP NEG, NO PLAN REQD: HCPCS | Performed by: FAMILY MEDICINE

## 2022-03-01 PROCEDURE — G0439 PPPS, SUBSEQ VISIT: HCPCS | Performed by: FAMILY MEDICINE

## 2022-03-01 PROCEDURE — G8536 NO DOC ELDER MAL SCRN: HCPCS | Performed by: FAMILY MEDICINE

## 2022-03-01 PROCEDURE — G8427 DOCREV CUR MEDS BY ELIG CLIN: HCPCS | Performed by: FAMILY MEDICINE

## 2022-03-01 NOTE — PROGRESS NOTES
Progress Note    Patient: Shawn Ceja MRN: [id number]  SSN: [id number]    YOB: 1940  Age: [de-identified] y.o. Sex: male        Chief Complaint   Patient presents with    Follow Up Chronic Condition     he is a [de-identified]y.o. year old male who presents for follow up of chronic health conditions. Patient with hx of T2D, hypothyroidism, HLD and CHF. He is due for lab work. Patient denies HA, dizziness, SOB, CP, abdominal pain, dysuria, acute myalgias or arthralgias. Encounter Diagnoses   Name Primary? 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 Overall the patient feels well with good energy level. Insulin dependence: no   Pertinent Labs:   Lab Results   Component Value Date/Time    Hemoglobin A1c 5.9 (H) 03/01/2022 08:55 AM      Body mass index is 28.47 kg/m². Lab Results   Component Value Date/Time    LDL, calculated 106 (H) 03/01/2022 08:55 AM        Wt Readings from Last 3 Encounters:   03/02/22 176 lb (79.8 kg)   03/01/22 176 lb 6.4 oz (80 kg)   10/06/21 184 lb (83.5 kg)        Social History     Tobacco Use   Smoking Status Never Smoker   Smokeless Tobacco Never Used        Medications, diet and exercise as means of diabetic control with a goal of an A1C of less than 7.0% discussed. Diabetic foot care and annual eye exam discussed as well.     Call immediately if having symptoms of high sugar (frequent urination, always thirsty) or low sugar (dizzy, lethargic, sweaty, nauseated, headache). Our overall goal is to reduce or eliminate the long term consequences of poorly controlled diabetes. Patient expresses understanding and agreement with our plan of care. Hypertension:  The patient reports:  taking medications as instructed, no medication side effects noted, no TIA's, no chest pain on exertion, no dyspnea on exertion, no swelling of ankles. BP Readings from Last 3 Encounters:   03/02/22 102/60   03/01/22 128/73   10/06/21 109/65     Lab Results   Component Value Date/Time    Sodium 136 03/01/2022 08:55 AM    Potassium 5.0 03/01/2022 08:55 AM    Chloride 103 03/01/2022 08:55 AM    CO2 30 03/01/2022 08:55 AM    Anion gap 3 (L) 03/01/2022 08:55 AM    Glucose 122 (H) 03/01/2022 08:55 AM    BUN 17 03/01/2022 08:55 AM    Creatinine 1.12 03/01/2022 08:55 AM    BUN/Creatinine ratio 15 03/01/2022 08:55 AM    GFR est AA >60 03/01/2022 08:55 AM    GFR est non-AA >60 03/01/2022 08:55 AM    Calcium 10.3 (H) 03/01/2022 08:55 AM     Call office as soon as possible if BP's over 140/90 on multiple occasions or with symptoms of dizziness, chest pain, shortness of breath, headache or ankle swelling. Our goal is to normalize the blood pressure to decrease the risks of strokes and heart attacks. The patient is in agreement with the plan.     Patient Active Problem List   Diagnosis Code    Colon polyps K63.5    Hypothyroidism E03.9    Asymptomatic PVCs I49.3    [facility]) A77.0    Ischemic cardiomyopathy I25.5    Mild 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 edema associated with diabetes mellitus due to underlying condition ([address] T84.3146    Prostate cancer ([facility] C61    Controlled maturity onset diabetes mellitus in young (ROMINA) type 2 with complication ([facility] [facility] A72.4    Dyslipidemia E78.5    Chronic systolic congestive heart failure (HCC) I50.22    Statin intolerance Z78.9     Past Surgical History:   Procedure Laterality Date    HX HERNIA REPAIR      left inguinal x 2    [place] CARDIAC SURG PROCEDURE UNLIST       Social History     Socioeconomic History    Marital status:      Spouse name: Not on file    Number of children: Not on file    Years of education: Not on file    Highest education level: Not on file   Occupational History    Not on file   Tobacco Use    Smoking status: Never Smoker    Smokeless tobacco: Never Used   Substance and Sexual Activity    Alcohol use: No    Drug use: No    Sexual activity: Not on file   Other Topics Concern    Not on file   Social History Narrative    Not on file     Social Determinants of Health     Financial Resource Strain:     Difficulty of Paying Living Expenses: Not on file   Food Insecurity:     Worried About Running Out of Food in the Last Year: Not on file    Suzanne of Food in the Last Year: Not on file   Transportation Needs:     Lack of Transportation (Medical): Not on file    Lack of Transportation (Non-Medical):  Not on file   Physical Activity:     Days of Exercise per Week: Not on file    Minutes of Exercise per Session: Not on file   Stress:     Feeling of Stress : Not on file   Social Connections:     Frequency of Communication with Friends and Family: Not on file    Frequency of Social Gatherings with Friends and Family: Not on file    Attends Jew Services: Not on file    Active Member of [address] [place] [address] [employer] or Organizations: Not on file    Attends Club or Organization Meetings: Not on file    Marital Status: Not on file   Intimate Partner Violence:     Fear of Current or Ex-Partner: Not on file    Emotionally Abused: Not on file    Physically Abused: Not on file    Sexually Abused: Not on file   Housing Stability:     Unable to Pay for Housing in the Last Year: Not on file    Number of [place] in the Last Year: Not on file    Unstable Housing in the Last Year: Not on file     Family History   Problem Relation Age of Onset    Cancer Father         GI    Hypertension Mother     Diabetes Mother     Cancer Sister         breast     Current Outpatient Medications   Medication Sig    glucose blood VI test strips (FreeStyle Lite Strips) strip USE TO TEST ONCE DAILY    trandolapriL (MAVIK) 2 mg tablet TAKE 1 TABLET NIGHTLY FOR LEFT VENTRICULAR DYSFUNCTION FOLLOWING MI    levothyroxine (synthroid) 50 mcg tablet TAKE 1 TABLET DAILY BEFORE BREAKFAST    carvediloL (COREG) 3.125 mg tablet TAKE 1 TABLET TWICE A DAY WITH MEALS    rosuvastatin (CRESTOR) 10 mg tablet TAKE 1 TABLET DAILY    FREESTYLE LANCETS 28 gauge misc USE TO TEST DAILY    aspirin delayed-release 81 mg tablet Take  by mouth daily.  Cholecalciferol, Vitamin D3, (VITAMIN D3) 1,000 unit cap Take  by mouth daily.  fish oil-dha-epa (FISH OIL) 1,200-144-216 mg Cap Take  by mouth.  VITAMIN B COMPLEX (B COMPLEX PO) Take  by mouth.  PV W-O MIKO/FERROUS FUMARATE/FA (M-VIT PO) Take  by mouth.  LORazepam (ATIVAN) 1 mg tablet Take 1 tablet 1 hour prior to MRI. May repeat x1 if needed    pyridostigmine (MESTINON) 60 mg tablet One half tablet TID. May increase to 1 TID    varicella-zoster [facility]) injection Shingrix, one injection now and repeat in 4-6 months. To be administered at Pharmacy. Fax confirmation to me at [phone]. (Patient not taking: Reported on 6/16/2021)     No current facility-administered medications for this visit.      Allergies   Allergen Reactions    Pcn [Penicillins] Rash and Swelling    Robitussin [Guaifenesin] Hives       Review of Systems:  Constitutional: Negative for fatigue, malaise  Resp: Negative for cough, wheezing or SOB  CV: Negative for chest pain, dizziness or palpitations  GI: Negative for nausea or abdominal pain  MS: Negative for acute myalgias or arthralgias   Neuro: Negative for HA, weakness or paresthesia  Psych: Negative for depression or anxiety     Vitals:    03/01/22 0804 03/01/22 0835   BP: 128/73    Pulse: (!) 38 74   Resp: 18    Temp: 98.2 °F (36.8 °C)    TempSrc: Oral    SpO2: 95%    Weight: 176 lb 6.4 oz (80 kg)    Height: 5' 6\" (1.676 m)        Physical Examination:  General: Well developed, well nourished, in no acute distress  Head: Normocephalic, atraumatic  Eyes: Sclera clear, EOMI  Neck: Normal range of motion  Respiratory: ymmetrical, unlabored effort  Cardiovascular: Regular rate and rhythm  Extremities: Full range of motion, normal gait  Neurologic: No focal deficits  Psych: Active, alert and oriented. Affect appropriate       ICD-10-CM ICD-9-CM    1. Diabetes mellitus type 2, diet-controlled ([facility])  E11.9 250.00 MICROALBUMIN, UR, RAND W/ MICROALB/CREAT RATIO      LIPID PANEL      METABOLIC PANEL, COMPREHENSIVE      HEMOGLOBIN A1C WITH EAG      HGB & HCT      VITAMIN B12      VITAMIN B12      HGB & HCT      HEMOGLOBIN A1C WITH EAG      METABOLIC PANEL, COMPREHENSIVE      LIPID PANEL      MICROALBUMIN, UR, RAND W/ MICROALB/CREAT RATIO      CANCELED: TSH 3RD GENERATION   2. Pure hypercholesterolemia  Y47.06 497.2 METABOLIC PANEL, COMPREHENSIVE      METABOLIC PANEL, COMPREHENSIVE   3. Hypothyroidism due to acquired atrophy of thyroid  E03.4 244.8 CANCELED: TSH 3RD GENERATION     246.8    4. Chronic systolic congestive heart failure ([facility])  I50.22 428.22 MAGNESIUM     428.0 MAGNESIUM   5. Prostate cancer ([facility])  C61 185    6. 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 Other general symptoms and signs   R68.89 780.99 VITAMIN B12      VITAMIN B12       Plan of care:  Diagnoses were discussed in detail with patient. Medications reviewed and appropriate. Patient to continue current prescribed medications as written. Medication risks/benefits/side effects discussed with patient.    All of the patient's questions were addressed and answered to apparent satisfaction. The patient understands and agrees with our plan of care. The patient knows to call back if they have questions about the plan of care or if symptoms change. The patient received an After-Visit Summary which contains VS, diagnoses, orders, allergy and medication lists. Future Appointments   Date Time Provider Sarina Susie   3/7/2022  8:15 AM SPT MRI 1 SPTMRI SPT   10/27/2022  2:40 PM Azael Mojica MD NEU BS AMB           Follow-up and Dispositions    · Return in about 6 months (around 9/1/2022), or if symptoms worsen or fail to improve. The following Annual Medicare Wellness Exam is distinct and separate from the medical evaluation and decision making. This is the Subsequent Medicare Annual Wellness Exam, performed 12 months or more after the Initial AWV or the last Subsequent AWV    I have reviewed the patient's medical history in detail and updated the computerized patient record. Assessment/Plan   Education and counseling provided:  Are appropriate based on today's review and evaluation  End-of-Life planning (with patient's consent)    1. Medicare annual wellness visit, subsequent       Depression Risk Factor Screening     3 most recent PHQ Screens 3/2/2022   Little interest or pleasure in doing things Not at all   Feeling down, depressed, irritable, or hopeless Not at all   Total Score PHQ 2 0       Alcohol & Drug Abuse Risk Screen    Do you average more than 1 drink per night or more than 7 drinks a week: No    In the past three months have you have had more than 4 drinks containing alcohol on one occasion: No          Functional Ability and Level of Safety    Hearing: Hearing is good. Activities of Daily Living: The home contains: no safety equipment. Patient does total self care      Ambulation: with no difficulty     Fall Risk:  Fall Risk Assessment, last 12 mths 3/2/2022   Able to walk?  Yes   Fall in past 12 months? 0   Do you feel unsteady?  0   Are you worried about falling 0      Abuse Screen:  Patient is not abused       Cognitive Screening    Has your family/caregiver stated any concerns about your memory: no       Health Maintenance Due     Health Maintenance Due   Topic Date Due    Eye Exam Retinal or Dilated  01/21/2021    DTaP/Tdap/Td series (2 - Td or Tdap) 05/17/2021    COVID-19 Vaccine (3 - Booster for Moderna series) 08/09/2021    Medicare Yearly Exam  03/17/2022    Foot Exam Q1  03/22/2022       Patient Care Team   Patient Care Team:  Patrick Alex MD as PCP - General (Family Medicine)  Patrick Alex MD as PCP - [facility]  Gina Grover MD (Urology)  Guillermina Hernandez MD (Cardiology)  Michael Potts MD (Ophthalmology)    History     Patient Active Problem List   Diagnosis Code    Colon polyps K63.5    Hypothyroidism E03.9    Asymptomatic PVCs I49.3    [facility] spotted fever) A77.0    Ischemic cardiomyopathy I25.5    Mild 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 edema associated with diabetes mellitus due to underlying condition ([address] Q91.6128    Prostate cancer ([facility] C61    Controlled maturity onset diabetes mellitus in young (ROMINA) type 2 with complication ([address] P44.1    Dyslipidemia E78.5    Chronic systolic congestive heart failure (HCC) I50.22    Statin intolerance Z78.9     Past Medical History:   Diagnosis Date    Cardiomyopathy, dilated, nonischemic ([address]     Colon polyps     Hypercholesterolemia     Prostate cancer ([address]     [address], watch and wait strategy    [facility] spotted fever)     T2DM (type 2 diabetes mellitus) ([facility] 8/21/2013      Past Surgical History:   Procedure Laterality Date    HX HERNIA REPAIR      left inguinal x 2    [place] CARDIAC SURG PROCEDURE UNLIST       Current Outpatient Medications   Medication Sig Dispense Refill    glucose blood VI test strips (FreeStyle Lite Strips) strip USE TO TEST ONCE DAILY 100 Strip 0    trandolapriL (MAVIK) 2 mg tablet TAKE 1 TABLET NIGHTLY FOR LEFT VENTRICULAR DYSFUNCTION FOLLOWING MI 90 Tablet 0    levothyroxine (synthroid) 50 mcg tablet TAKE 1 TABLET DAILY BEFORE BREAKFAST 90 Tablet 0    carvediloL (COREG) 3.125 mg tablet TAKE 1 TABLET TWICE A DAY WITH MEALS 180 Tablet 0    rosuvastatin (CRESTOR) 10 mg tablet TAKE 1 TABLET DAILY 90 Tab 3    FREESTYLE LANCETS 28 gauge misc USE TO TEST DAILY 100 Lancet 4    aspirin delayed-release 81 mg tablet Take  by mouth daily.  Cholecalciferol, Vitamin D3, (VITAMIN D3) 1,000 unit cap Take  by mouth daily.  fish oil-dha-epa (FISH OIL) 1,200-144-216 mg Cap Take  by mouth.  VITAMIN B COMPLEX (B COMPLEX PO) Take  by mouth.  PV W-O MIKO/FERROUS FUMARATE/FA (M-VIT PO) Take  by mouth.  LORazepam (ATIVAN) 1 mg tablet Take 1 tablet 1 hour prior to MRI. May repeat x1 if needed 2 Tablet 0    pyridostigmine (MESTINON) 60 mg tablet One half tablet TID. May increase to 1 TID 90 Tablet 0    varicella-zoster [facility]) injection Shingrix, one injection now and repeat in 4-6 months. To be administered at Pharmacy. Fax confirmation to me at [phone].  (Patient not taking: Reported on 6/16/2021) 2 Each 0     Allergies   Allergen Reactions    Pcn [Penicillins] Rash and Swelling    Robitussin [Guaifenesin] Hives       Family History   Problem Relation Age of Onset    Cancer Father         GI    Hypertension Mother     Diabetes Mother    [facility] Cancer Sister         breast     Social History     Tobacco Use    Smoking status: Never Smoker    Smokeless tobacco: Never Used   Substance Use Topics    Alcohol use: No         Basia MD Denisa

## 2022-03-01 NOTE — PATIENT INSTRUCTIONS
Medicare Wellness Visit, Male    The best way to live healthy is to have a lifestyle where you eat a well-balanced diet, exercise regularly, limit alcohol use, and quit all forms of tobacco/nicotine, if applicable. Regular preventive services are another way to keep healthy. Preventive services (vaccines, screening tests, monitoring & exams) can help personalize your care plan, which helps you manage your own care. Screening tests can find health problems at the earliest stages, when they are easiest to treat. [facility] follows the current, evidence-based guidelines published by the [facility] [employer] [place] ([facility]) when recommending preventive services for our patients. Because we follow these guidelines, sometimes recommendations change over time as research supports it. (For example, a prostate screening blood test is no longer routinely recommended for men with no symptoms). Of course, you and your doctor may decide to screen more often for some diseases, based on your risk and co-morbidities (chronic disease you are already diagnosed with). Preventive services for you include:  - Medicare offers their members a free annual wellness visit, which is time for you and your primary care provider to discuss and plan for your preventive service needs. Take advantage of this benefit every year!  -All adults over age 72 should receive the recommended pneumonia vaccines. Current USPSTF guidelines recommend a series of two vaccines for the best pneumonia protection.   -All adults should have a flu vaccine yearly and tetanus vaccine every 10 years.  -All adults age 48 and older should receive the shingles vaccines (series of two vaccines).        -All adults age 38-68 who are overweight should have a diabetes screening test once every three years.   -Other screening tests & preventive services for persons with diabetes include: an eye exam to screen for diabetic retinopathy, a kidney function test, a foot exam, and stricter control over your cholesterol.   -Cardiovascular screening for adults with routine risk involves an electrocardiogram (ECG) at intervals determined by the provider.   -Colorectal cancer screening should be done for adults age 54-65 with no increased risk factors for colorectal cancer. There are a number of acceptable methods of screening for this type of cancer. Each test has its own benefits and drawbacks. Discuss with your provider what is most appropriate for you during your annual wellness visit. The different tests include: colonoscopy (considered the best screening method), a fecal occult blood test, a fecal DNA test, and sigmoidoscopy.  -All adults born between [facility] should be screened once for Hepatitis C.  -An Abdominal Aortic Aneurysm (AAA) Screening is recommended for men age 73-68 who has ever smoked in their lifetime.      Here is a list of your current Health Maintenance items (your personalized list of preventive services) with a due date:  Health Maintenance Due   Topic Date Due    Eye Exam  01/21/2021    DTaP/Tdap/Td  (2 - Td or Tdap) 05/17/2021    COVID-19 Vaccine (3 - Booster for Moderna series) 08/09/2021    Albumin Urine Test  03/16/2022    Annual Well Visit  03/17/2022    Diabetic Foot Care  03/22/2022

## 2022-03-01 NOTE — PROGRESS NOTES
1. Have you been to the ER, urgent care clinic since your last visit? Hospitalized since your last visit? No    2. Have you seen or consulted any other health care providers outside of the [address] [place] [address] since your last visit? Include any pap smears or colon screening. Yes When: neurologist at [facility], [facility] dr Stefan Barahona, urology dr Keny Garcia     3. For patients aged 39-70: Has the patient had a colonoscopy / FIT/ Cologuard? NA - based on age    If the patient is female:    4. For patients aged 41-77: Has the patient had a mammogram within the past 2 years? NA - based on age or sex      11. For patients aged 21-65: Has the patient had a pap smear? NA - based on age or sex     Reviewed record in preparation for visit and have necessary documentation  Pt did not bring medication to office visit for review  Patient is accompanied by self I have received verbal consent from Eligio Salazar to discuss any/all medical information while they are present in the room.     Goals that were addressed and/or need to be completed during or after this appointment include     Health Maintenance Due   Topic Date Due    Eye Exam Retinal or Dilated  01/21/2021    DTaP/Tdap/Td series (2 - Td or Tdap) 05/17/2021    COVID-19 Vaccine (3 - Booster for Moderna series) 08/09/2021    MICROALBUMIN Q1  03/16/2022    Medicare Yearly Exam  03/17/2022    Foot Exam Q1  03/22/2022

## 2022-03-02 ENCOUNTER — OFFICE VISIT (OUTPATIENT)
Dept: NEUROLOGY | Age: 82
End: 2022-03-02
Payer: MEDICARE

## 2022-03-02 VITALS
HEART RATE: 60 BPM | HEIGHT: 66 IN | WEIGHT: 176 LBS | BODY MASS INDEX: 28.28 KG/M2 | OXYGEN SATURATION: 98 % | SYSTOLIC BLOOD PRESSURE: 102 MMHG | RESPIRATION RATE: 16 BRPM | DIASTOLIC BLOOD PRESSURE: 60 MMHG

## 2022-03-02 DIAGNOSIS — H02.402 PTOSIS, LEFT EYELID: Primary | ICD-10-CM

## 2022-03-02 DIAGNOSIS — H53.2 DIPLOPIA: ICD-10-CM

## 2022-03-02 LAB
ALBUMIN SERPL-MCNC: 4.6 G/DL (ref 3.5–5)
ALBUMIN/GLOB SERPL: 1.6 {RATIO} (ref 1.1–2.2)
ALP SERPL-CCNC: 50 U/L (ref 45–117)
ALT SERPL-CCNC: 23 U/L (ref 12–78)
ANION GAP SERPL CALC-SCNC: 3 MMOL/L (ref 5–15)
AST SERPL-CCNC: 19 U/L (ref 15–37)
BILIRUB SERPL-MCNC: 0.9 MG/DL (ref 0.2–1)
BUN SERPL-MCNC: 17 MG/DL (ref 6–20)
BUN/CREAT SERPL: 15 (ref 12–20)
CALCIUM SERPL-MCNC: 10.3 MG/DL (ref 8.5–10.1)
CHLORIDE SERPL-SCNC: 103 MMOL/L (ref 97–108)
CHOLEST SERPL-MCNC: 172 MG/DL
CO2 SERPL-SCNC: 30 MMOL/L (ref 21–32)
CREAT SERPL-MCNC: 1.12 MG/DL (ref 0.7–1.3)
CREAT UR-MCNC: 111 MG/DL
EST. AVERAGE GLUCOSE BLD GHB EST-MCNC: 123 MG/DL
GLOBULIN SER CALC-MCNC: 2.9 G/DL (ref 2–4)
GLUCOSE SERPL-MCNC: 122 MG/DL (ref 65–100)
HBA1C MFR BLD: 5.9 % (ref 4–5.6)
HCT VFR BLD AUTO: 49.1 % (ref 36.6–50.3)
HDLC SERPL-MCNC: 48 MG/DL
HDLC SERPL: 3.6 {RATIO} (ref 0–5)
HGB BLD-MCNC: 15.7 G/DL (ref 12.1–17)
LDLC SERPL CALC-MCNC: 106 MG/DL (ref 0–100)
MAGNESIUM SERPL-MCNC: 2.3 MG/DL (ref 1.6–2.4)
MICROALBUMIN UR-MCNC: 1.4 MG/DL
MICROALBUMIN/CREAT UR-RTO: 13 MG/G (ref 0–30)
POTASSIUM SERPL-SCNC: 5 MMOL/L (ref 3.5–5.1)
PROT SERPL-MCNC: 7.5 G/DL (ref 6.4–8.2)
SODIUM SERPL-SCNC: 136 MMOL/L (ref 136–145)
TRIGL SERPL-MCNC: 90 MG/DL (ref ?–150)
VIT B12 SERPL-MCNC: 571 PG/ML (ref 193–986)
VLDLC SERPL CALC-MCNC: 18 MG/DL

## 2022-03-02 PROCEDURE — G8510 SCR DEP NEG, NO PLAN REQD: HCPCS | Performed by: PSYCHIATRY & NEUROLOGY

## 2022-03-02 PROCEDURE — G8427 DOCREV CUR MEDS BY ELIG CLIN: HCPCS | Performed by: PSYCHIATRY & NEUROLOGY

## 2022-03-02 PROCEDURE — 99204 OFFICE O/P NEW MOD 45 MIN: CPT | Performed by: PSYCHIATRY & NEUROLOGY

## 2022-03-02 PROCEDURE — G8536 NO DOC ELDER MAL SCRN: HCPCS | Performed by: PSYCHIATRY & NEUROLOGY

## 2022-03-02 PROCEDURE — G8419 CALC BMI OUT NRM PARAM NOF/U: HCPCS | Performed by: PSYCHIATRY & NEUROLOGY

## 2022-03-02 PROCEDURE — 1101F PT FALLS ASSESS-DOCD LE1/YR: CPT | Performed by: PSYCHIATRY & NEUROLOGY

## 2022-03-02 RX ORDER — PYRIDOSTIGMINE BROMIDE 60 MG/1
TABLET ORAL
Qty: 90 TABLET | Refills: 0 | Status: SHIPPED | OUTPATIENT
Start: 2022-03-02 | End: 2022-05-03

## 2022-03-02 NOTE — PROGRESS NOTES
Chief Complaint   Patient presents with    Neurologic Problem       HISTORY OF PRESENT ILLNESS  Pauline Malloy is a [de-identified] y.o. male who came in for a neurological consultation requested by Dr. Arabella Cazares. He developed ptosis of the left eye about 2 weeks ago and it has not improved as yet. He had some double vision at some point but does not notice it anymore. No symptoms in the other eye. His eye exam was otherwise unremarkable except for developing cataracts. Denies any muscle weakness, shortness of breath or intolerance to activity. No dysphagia or dysphonia. His ptosis did improve with ice pack test and myasthenia gravis was suspected. He had lab work drawn results of which were not available at this time. Past Medical History:   Diagnosis Date    Cardiomyopathy, dilated, nonischemic ([facility]     Colon polyps     Hypercholesterolemia     Prostate cancer ([facility] [facility]     Cielo 6, watch and wait strategy    [facility] spotted fever)     T2DM (type 2 diabetes mellitus) ([address] 8/21/2013     Current Outpatient Medications   Medication Sig    pyridostigmine (MESTINON) 60 mg tablet One half tablet TID. May increase to 1 TID    glucose blood VI test strips (FreeStyle Lite Strips) strip USE TO TEST ONCE DAILY    trandolapriL (MAVIK) 2 mg tablet TAKE 1 TABLET NIGHTLY FOR LEFT VENTRICULAR DYSFUNCTION FOLLOWING MI    levothyroxine (synthroid) 50 mcg tablet TAKE 1 TABLET DAILY BEFORE BREAKFAST    carvediloL (COREG) 3.125 mg tablet TAKE 1 TABLET TWICE A DAY WITH MEALS    rosuvastatin (CRESTOR) 10 mg tablet TAKE 1 TABLET DAILY    FREESTYLE LANCETS 28 gauge misc USE TO TEST DAILY    aspirin delayed-release 81 mg tablet Take  by mouth daily.  Cholecalciferol, Vitamin D3, (VITAMIN D3) 1,000 unit cap Take  by mouth daily.  fish oil-dha-epa (FISH OIL) 1,200-144-216 mg Cap Take  by mouth.  VITAMIN B COMPLEX (B COMPLEX PO) Take  by mouth.  PV W-O MIKO/FERROUS FUMARATE/FA (M-VIT PO) Take  by mouth.     varicella-zoster [facility]) injection Shingrix, one injection now and repeat in 4-6 months. To be administered at Pharmacy. Fax confirmation to me at [phone]. (Patient not taking: Reported on 6/16/2021)     No current facility-administered medications for this visit. Allergies   Allergen Reactions    Pcn [Penicillins] Rash and Swelling    Robitussin [Guaifenesin] Hives     Family History   Problem Relation Age of Onset    Cancer Father         GI    Hypertension Mother     Diabetes Mother     Cancer Sister         breast     Social History     Tobacco Use    Smoking status: Never Smoker    Smokeless tobacco: Never Used   Substance Use Topics    Alcohol use: No    Drug use: No     Past Surgical History:   Procedure Laterality Date    HX HERNIA REPAIR      left inguinal x 2    RI CARDIAC SURG PROCEDURE UNLIST           REVIEW OF SYSTEMS  Review of Systems - History obtained from the patient  Psychological ROS: negative  ENT ROS: negative  Hematological and Lymphatic ROS: negative  Endocrine ROS: negative  Respiratory ROS: no cough, shortness of breath, or wheezing  Cardiovascular ROS: no chest pain or dyspnea on exertion  Gastrointestinal ROS: no abdominal pain, change in bowel habits, or black or bloody stools  Genito-Urinary ROS: no dysuria, trouble voiding, or hematuria  Musculoskeletal ROS: negative  Dermatological ROS: negative      PHYSICAL EXAMINATION:    Visit Vitals  /60   Pulse 60   Resp 16   Ht 5' 6\" (1.676 m)   Wt 176 lb (79.8 kg)   SpO2 98%   BMI 28.41 kg/m²     General:  Well groomed individual in no acute distress. Neck: Supple, nontender, no bruits, no pain with resistance to active range of motion. Heart: Regular rate and rhythm. Normal S1S2. Lungs:  Equal chest expansion, no cough, no wheeze  Musculoskeletal:  Extremities revealed no edema and had full range of motion of joints.     Psych:  Good mood and bright affect    NEUROLOGICAL EXAMINATION:     Mental Status: Alert and oriented to person, place, and time with recent and remote memory intact. Attention span and concentration are normal. Speech is fluent. Cranial Nerves:    II, III, IV, VI:  Visual acuity grossly intact. Visual fields are normal.    Pupils are equal, round, and reactive to light. Extra-ocular movements are full and fluid. Fundoscopic exam was benign. There is left-sided ptosis   V-XII: Hearing is grossly intact. Facial features are symmetric, with normal sensation and strength. The palate rises symmetrically and the tongue protrudes midline. Sternocleidomastoids 5/5. Motor Examination: Normal tone, bulk, and strength. 5/5 muscle strength throughout. No cogwheel rigidity or clonus present. Sensory exam:  Normal throughout to pinprick, temperature, and vibration sense. Normal proprioception. Coordination:  Finger to nose and rapid arm movement testing was normal.   No resting or intention tremor    Gait and Station:  Steady while walking on toes, heels, and with tandem walking. Normal arm swing. No Rhomberg or pronator drift. No muscle wasting or fasiculations noted. Reflexes:  DTRs 2+ throughout. Toes downgoing.         LABS / IMAGING  Lab Results   Component Value Date/Time    WBC 8.0 09/22/2021 10:35 AM    HGB 15.7 03/01/2022 08:55 AM    HCT 49.1 03/01/2022 08:55 AM    PLATELET 297 47/81/3233 10:35 AM    .2 (H) 09/22/2021 10:35 AM     Lab Results   Component Value Date/Time    Sodium 136 03/01/2022 08:55 AM    Potassium 5.0 03/01/2022 08:55 AM    Chloride 103 03/01/2022 08:55 AM    CO2 30 03/01/2022 08:55 AM    Anion gap 3 (L) 03/01/2022 08:55 AM    Glucose 122 (H) 03/01/2022 08:55 AM    BUN 17 03/01/2022 08:55 AM    Creatinine 1.12 03/01/2022 08:55 AM    BUN/Creatinine ratio 15 03/01/2022 08:55 AM    GFR est AA >60 03/01/2022 08:55 AM    GFR est non-AA >60 03/01/2022 08:55 AM    Calcium 10.3 (H) 03/01/2022 08:55 AM    Bilirubin, total 0.9 03/01/2022 08:55 AM Alk. phosphatase 50 03/01/2022 08:55 AM    Protein, total 7.5 03/01/2022 08:55 AM    Albumin 4.6 03/01/2022 08:55 AM    Globulin 2.9 03/01/2022 08:55 AM    A-G Ratio 1.6 03/01/2022 08:55 AM    ALT (SGPT) 23 03/01/2022 08:55 AM    AST (SGOT) 19 03/01/2022 08:55 AM     Lab Results   Component Value Date/Time    TSH 2.56 09/22/2021 10:35 AM     Lab Results   Component Value Date/Time    Vitamin B12 571 03/01/2022 08:55 AM    Folate >24.0 (H) 03/12/2009 02:15 PM       ASSESSMENT    ICD-10-CM ICD-9-CM    1. Ptosis, left eyelid  H02.402 374.30 MRI BRAIN W WO CONT      pyridostigmine (MESTINON) 60 mg tablet   2. Diplopia  H53.2 368.2 MRI BRAIN W WO CONT      pyridostigmine (MESTINON) 60 mg tablet       DISCUSSION  Mr. Tammie Lee he developed rather abrupt ptosis of the left eye about 2 weeks ago that has remained unchanged since then  Ptosis showed improvement with icepack test and there is a suspicion that he may have myasthenia gravis. Fortunately he does not have any generalized symptoms or diplopia. Unilateral ptosis is possible with ocular myasthenia but I have recommended MRI scan of the brain to rule out any other structural or ischemic cause. I will request results of myasthenia gravis antibody panel  Will give him an empiric trial of pyridostigmine 30 mg 3 times daily  Different treatment approaches to ocular myasthenia were discussed  We may consider doing chest CT depending upon the course and results    Thank you for allowing me to participate in the care of Mr. Cruz Kulkarni. Please feel free to contact me if you have any questions. I will be happy to follow to follow him along with you.       Anneliese Pina MD  Diplomate, American Board of Psychiatry & Neurology (Neurology)  Alisa Strickland Board of Psychiatry & Neurology (Clinical Neurophysiology)  Diplomate, American Board of Electrodiagnostic Medicine

## 2022-03-03 ENCOUNTER — TELEPHONE (OUTPATIENT)
Dept: FAMILY MEDICINE CLINIC | Age: 82
End: 2022-03-03

## 2022-03-03 NOTE — TELEPHONE ENCOUNTER
Attempted to call. No answer. Message left.   Called patient to find out location of [address] [place] [address]?

## 2022-03-04 ENCOUNTER — TELEPHONE (OUTPATIENT)
Dept: NEUROLOGY | Age: 82
End: 2022-03-04

## 2022-03-04 DIAGNOSIS — H53.2 DIPLOPIA: ICD-10-CM

## 2022-03-04 DIAGNOSIS — H02.402 PTOSIS, LEFT EYELID: Primary | ICD-10-CM

## 2022-03-04 RX ORDER — LORAZEPAM 1 MG/1
TABLET ORAL
Qty: 2 TABLET | Refills: 0 | Status: SHIPPED | OUTPATIENT
Start: 2022-03-04 | End: 2022-09-01 | Stop reason: SDUPTHER

## 2022-03-04 NOTE — TELEPHONE ENCOUNTER
Pt's wife called saying MRI was scheduled and pt needs medication to take prior to appointment due to claustrophobia. MRI is scheduled for Monday morning.  Please call back

## 2022-03-07 ENCOUNTER — HOSPITAL ENCOUNTER (OUTPATIENT)
Dept: MRI IMAGING | Age: 82
Discharge: HOME | End: 2022-03-07
Attending: PSYCHIATRY & NEUROLOGY
Payer: MEDICARE

## 2022-03-07 VITALS — BODY MASS INDEX: 28.09 KG/M2 | HEIGHT: 67 IN | WEIGHT: 179 LBS

## 2022-03-07 DIAGNOSIS — H53.2 DIPLOPIA: ICD-10-CM

## 2022-03-07 DIAGNOSIS — H02.402 PTOSIS, LEFT EYELID: ICD-10-CM

## 2022-03-07 PROCEDURE — 70553 MRI BRAIN STEM W/O & W/DYE: CPT

## 2022-03-07 PROCEDURE — A9576 INJ PROHANCE MULTIPACK: HCPCS | Performed by: PSYCHIATRY & NEUROLOGY

## 2022-03-07 PROCEDURE — 74011250636 HC RX REV CODE- 250/636: Performed by: PSYCHIATRY & NEUROLOGY

## 2022-03-07 RX ADMIN — GADOTERIDOL 15 ML: 279.3 INJECTION, SOLUTION INTRAVENOUS at 09:42

## 2022-03-19 PROBLEM — E78.5 DYSLIPIDEMIA: Status: ACTIVE | Noted: 2018-05-16

## 2022-03-19 PROBLEM — Z78.9 STATIN INTOLERANCE: Status: ACTIVE | Noted: 2019-05-31

## 2022-03-19 PROBLEM — I50.22 CHRONIC SYSTOLIC CONGESTIVE HEART FAILURE (HCC): Status: ACTIVE | Noted: 2018-05-16

## 2022-03-19 PROBLEM — E13.8: Status: ACTIVE | Noted: 2018-04-16

## 2022-06-02 ENCOUNTER — TELEPHONE (OUTPATIENT)
Dept: CARDIOLOGY CLINIC | Age: 82
End: 2022-06-02

## 2022-06-02 NOTE — TELEPHONE ENCOUNTER
L/m on vm requesting a c/b to [facility] an appt w/Dr Alison Ramos at the [facility] location on 6/8 for a cardiac clearance.

## 2022-06-08 ENCOUNTER — OFFICE VISIT (OUTPATIENT)
Dept: CARDIOLOGY CLINIC | Facility: CLINIC | Age: 82
End: 2022-06-08
Payer: MEDICARE

## 2022-06-08 VITALS
OXYGEN SATURATION: 96 % | BODY MASS INDEX: 26.37 KG/M2 | HEIGHT: 67 IN | DIASTOLIC BLOOD PRESSURE: 71 MMHG | RESPIRATION RATE: 20 BRPM | HEART RATE: 78 BPM | WEIGHT: 168 LBS | SYSTOLIC BLOOD PRESSURE: 118 MMHG | TEMPERATURE: 98.4 F

## 2022-06-08 DIAGNOSIS — Z78.9 STATIN INTOLERANCE: ICD-10-CM

## 2022-06-08 DIAGNOSIS — I50.22 CHRONIC SYSTOLIC CONGESTIVE HEART FAILURE (HCC): ICD-10-CM

## 2022-06-08 DIAGNOSIS — E78.5 DYSLIPIDEMIA: ICD-10-CM

## 2022-06-08 DIAGNOSIS — Z01.810 PRE-OPERATIVE CARDIOVASCULAR EXAMINATION: ICD-10-CM

## 2022-06-08 DIAGNOSIS — I25.5 ISCHEMIC CARDIOMYOPATHY: Primary | ICD-10-CM

## 2022-06-08 DIAGNOSIS — E13.8: ICD-10-CM

## 2022-06-08 PROCEDURE — G8417 CALC BMI ABV UP PARAM F/U: HCPCS | Performed by: SPECIALIST

## 2022-06-08 PROCEDURE — 1123F ACP DISCUSS/DSCN MKR DOCD: CPT | Performed by: SPECIALIST

## 2022-06-08 PROCEDURE — 99214 OFFICE O/P EST MOD 30 MIN: CPT | Performed by: SPECIALIST

## 2022-06-08 PROCEDURE — G8536 NO DOC ELDER MAL SCRN: HCPCS | Performed by: SPECIALIST

## 2022-06-08 PROCEDURE — 3044F HG A1C LEVEL LT 7.0%: CPT | Performed by: SPECIALIST

## 2022-06-08 PROCEDURE — 1101F PT FALLS ASSESS-DOCD LE1/YR: CPT | Performed by: SPECIALIST

## 2022-06-08 PROCEDURE — G8432 DEP SCR NOT DOC, RNG: HCPCS | Performed by: SPECIALIST

## 2022-06-08 PROCEDURE — G8427 DOCREV CUR MEDS BY ELIG CLIN: HCPCS | Performed by: SPECIALIST

## 2022-06-08 NOTE — PROGRESS NOTES
Trace Lares     1940       Ting Rob MD, [facility]  Date of Visit-6/8/2022   PCP is Carmelo Mosley MD   [facility] [place]  [facility]  HPI:  Trace Lares is a [de-identified] y.o. male   One year fu   · Reduced EF, CHF with Ischemic CMY  · MRI 2014 showed RCA infarct. He had dense subendocardial infarct in the basal and mid inferolateral and lateral walls and MRI EF was 42%;  he has declined ICD. Echo 2013 showed EF 30-35% global hypokinesia stress echo showed poor exercise capacity in a cath 9/24/2013 showed mild plaque with an EDP of 16 and no AV gradient  echo in December 2020 showed EF 30-35%. Mild septal wall hypertrophy. Mild posterior wall hypertrophy. RV: Mildly reduced systolic function. · AO: Mild ascending aorta dilatation. Ascending aorta diameter = 4.3 cm. · AV: Moderate aortic valve sclerosis with no significant stenosis. · MV: Mild mitral valve regurgitation is present. · PA: Pulmonary arterial systolic pressure is 29 mmHg. · XOL-gets joint pain if takes every day  · Murmur with aortic sclerosis  · PVCs, first degree AV block  Here pre op, prostate cancer , follow up surgery with Dr Jonnathan Corona , note reviewed   No complaints   Less vertigo  Pt is a  on a racing crew-now retired. Seems very active person  Denies chest pain, edema, syncope or shortness of breath at rest, has no tachycardia, palpitations or sense of arrhythmia. Feels well  Cant take statin daily due to myalgia but doing fine every 2-3 days with the pill  Assessment/Plan:     1. Ischemic cardiomyopathy  Prior infarct  In the RCA. EF is unchanged and remains NYHA 1. No changes in current medications. It is entering that he really did have other plaque so he may have had a clot in that area that led to the infarction.   Continue aspirin statin beta-blocker  Key CAD CHF Meds             carvediloL (COREG) 3.125 mg tablet (Taking) TAKE 1 TABLET TWICE A DAY WITH MEALS trandolapriL (MAVIK) 2 mg tablet (Taking) TAKE 1 TABLET NIGHTLY FOR LEFT VENTRICULAR DYSFUNCTION FOLLOWING MI    rosuvastatin (CRESTOR) 10 mg tablet (Taking) TAKE 1 TABLET DAILY    aspirin delayed-release 81 mg tablet (Taking) Take  by mouth daily. fish oil-dha-epa (FISH OIL) 1,200-144-216 mg Cap (Taking) Take  by mouth. 2. Chronic systolic congestive heart failure ([address]  Compensated. EF 35% NYHA I  Continue Coreg 3.125 mg twice daily and Mavik 2 mg daily-BB and ACE  Has PVCs but declines AICD    3. Pre op evaluation  I have no objection to the planned  surgery. Patient seems to be at a low risk for lena-operative severe adverse cardiac events. Ok to proceed with Dr Abdullahi Fitch surgery    4. Dyslipidemia  On Crestor, limited to every 2-3 days due to myalgia, which is fine    Key Antihyperlipidemia Meds             rosuvastatin (CRESTOR) 10 mg tablet (Taking) TAKE 1 TABLET DAILY    fish oil-dha-epa (FISH OIL) 1,200-144-216 mg Cap (Taking) Take  by mouth. Lab Results   Component Value Date/Time    LDL, calculated 106 (H) 03/01/2022 08:55 AM       5 . Statin intolerance-as above  6 . 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 [address]  Lab Results   Component Value Date/Time    Hemoglobin A1c 5.9 (H) 03/01/2022 08:55 AM      There are no Patient Instructions on file for this visit. Future Appointments   Date Time Provider Sarina Mercedesi   10/27/2022  2:40 PM MD YOLI Cardenas BS AMB   6/14/2023 10:00 AM Ting Hairston MD CAVBL BS AMB       F/u 12 months in [place]  Get echo in 6 months with EKG at [address] [place] [address]:   1. Ischemic cardiomyopathy    2. Chronic systolic congestive heart failure ([facility] [address]    3. Pre-operative cardiovascular examination    4. Dyslipidemia    5. Statin intolerance    6.  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 [address]       Cardiac History:   Echo 9/2013 - global HK, EF 30-35%  Stress echo 9/2013- poor exercise capacity, resting global HK, EF 30%, no enhancement of EF with exercise  Cath 9/24/2013 - EDP 16, no AV gradient, LV dilated, EF 25-30% global, LM large nl, LAD mild plaque, LCX nl, RCA normal.    Echo 5/15/2014 - EF 30-35%, global HK    Cardiac MRI 6/2014 - Dense medium size subendocardial infarct of the basal and mid   inferolateral and lateral walls (primarily mid segments) with myocardial   wall thinning and fatty metaplasia of the infarct suggest chronic non   reperfusing infarct. No significant viability in these segments. All other   segments are completely viable. LVEF 42%. Echo 1/5/15 - EF 30-35%. inferior and inferolateral akinesis, mild MR    Echo 5/24/18 - EF 35-40%. Mod HK of basal inferior walls. Mild LVH. Mild TR. AoV sclerosis without stenosis. Aortic root mild dilatation; ascending aorta 3.9cm. Improvement in LF function when compared to study 1/2015. Echo 12/9/19 - EF 30-35%, severe inferior hypokinesis     Future Appointments   Date Time Provider Sarina Susie   10/27/2022  2:40 PM Brian Romo MD NEU BS AMB        ROS-except as noted above. . A complete cardiac and respiratory are reviewed and negative except as above ; Resp-denies wheezing  or productive cough,. Const- No unusual weight loss or fever; Neuro-no recent seizure or CVA ; GI- No BRBPR, abdom pain, bloating ; - no  hematuria   see supplement sheet, initialed and to be scanned by staff  Past Medical History:   Diagnosis Date    Cardiomyopathy, dilated, nonischemic ([address]     Colon polyps     Hypercholesterolemia     Prostate cancer ([address]     Cielo 6, watch and wait strategy    [facility] spotted fever)     T2DM (type 2 diabetes mellitus) ([facility] [address] 8/21/2013      Social Hx= reports that he has never smoked. He has never used smokeless tobacco. He reports that he does not drink alcohol and does not use drugs.      Exam and Labs:  /71   Pulse 78   Temp 98.4 °F (36.9 °C)   Resp 20   Ht 5' 7\" (1.702 m)   Wt 168 lb (76.2 kg)   SpO2 96%   BMI 26.31 kg/m² Constitutional:  NAD, comfortable  Head: NC,AT. Eyes: No scleral icterus. Neck:  Neck supple. No JVD present. Throat: moist mucous membranes. Chest: Effort normal & normal respiratory excursion . Neurological: alert, conversant and oriented . Skin: Skin is not cold. No obvious systemic rash noted. Not diaphoretic. No erythema. Psychiatric:  Grossly normal mood and affect. Behavior appears normal. Extremities:  no clubbing or cyanosis. Abdomen: non distended    Lungs:breath sounds normal. No stridor. distress, wheezes or  Rales. Heart: normal rate, regular rhythm, normal S1, S2, no murmurs, rubs, clicks or gallops , PMI non displaced. Edema: Edema is none.   Lab Results   Component Value Date/Time    Cholesterol, total 172 03/01/2022 08:55 AM    HDL Cholesterol 48 03/01/2022 08:55 AM    LDL, calculated 106 (H) 03/01/2022 08:55 AM    Triglyceride 90 03/01/2022 08:55 AM    CHOL/HDL Ratio 3.6 03/01/2022 08:55 AM     Lab Results   Component Value Date/Time    Sodium 136 03/01/2022 08:55 AM    Potassium 5.0 03/01/2022 08:55 AM    Chloride 103 03/01/2022 08:55 AM    CO2 30 03/01/2022 08:55 AM    Anion gap 3 (L) 03/01/2022 08:55 AM    Glucose 122 (H) 03/01/2022 08:55 AM    BUN 17 03/01/2022 08:55 AM    Creatinine 1.12 03/01/2022 08:55 AM    BUN/Creatinine ratio 15 03/01/2022 08:55 AM    GFR est AA >60 03/01/2022 08:55 AM    GFR est non-AA >60 03/01/2022 08:55 AM    Calcium 10.3 (H) 03/01/2022 08:55 AM      Wt Readings from Last 3 Encounters:   06/08/22 168 lb (76.2 kg)   03/07/22 179 lb (81.2 kg)   03/02/22 176 lb (79.8 kg)      BP Readings from Last 3 Encounters:   06/08/22 118/71   03/02/22 102/60   03/01/22 128/73      Current Outpatient Medications   Medication Sig    FreeStyle Lite Strips strip USE TO TEST ONCE DAILY    carvediloL (COREG) 3.125 mg tablet TAKE 1 TABLET TWICE A DAY WITH MEALS    levothyroxine (synthroid) 50 mcg tablet TAKE 1 TABLET DAILY BEFORE BREAKFAST    trandolapriL (MAVIK) 2 mg tablet TAKE 1 TABLET NIGHTLY FOR LEFT VENTRICULAR DYSFUNCTION FOLLOWING MI    pyridostigmine (MESTINON) 60 mg tablet 1 TABLET 3 TIMES A DAY    rosuvastatin (CRESTOR) 10 mg tablet TAKE 1 TABLET DAILY    FREESTYLE LANCETS 28 gauge misc USE TO TEST DAILY    aspirin delayed-release 81 mg tablet Take  by mouth daily.  Cholecalciferol, Vitamin D3, (VITAMIN D3) 1,000 unit cap Take  by mouth daily.  fish oil-dha-epa (FISH OIL) 1,200-144-216 mg Cap Take  by mouth.  VITAMIN B COMPLEX (B COMPLEX PO) Take  by mouth.  PV W-O MIKO/FERROUS FUMARATE/FA (M-VIT PO) Take  by mouth.  LORazepam (ATIVAN) 1 mg tablet Take 1 tablet 1 hour prior to MRI. May repeat x1 if needed (Patient not taking: Reported on 6/8/2022)    varicella-zoster [facility]) injection Shingrix, one injection now and repeat in 4-6 months. To be administered at Pharmacy. Fax confirmation to me at [phone]. (Patient not taking: Reported on 6/16/2021)     No current facility-administered medications for this visit. Impression see above.

## 2022-06-08 NOTE — PROGRESS NOTES
1. Have you been to the ER, urgent care clinic since your last visit? Hospitalized since your last visit? No    2. Have you seen or consulted any other health care providers outside of the [address] [place] [address] since your last visit? Include any pap smears or colon screening.  No  Reviewed record in preparation for visit and have necessary documentation  Pt did not bring medication to office visit for review  opportunity was given for questions

## 2022-06-08 NOTE — Clinical Note
6/8/2022    Patient: Trace Lares   YOB: 1940   Date of Visit: 6/8/2022     Angel Kramer MD  [address] [address]  Via In Basket    Dear Angel Kramer MD,      Thank you for referring Mr. Chrissie Ledesma to [address] for evaluation. My notes for this consultation are attached. If you have questions, please do not hesitate to call me. I look forward to following your patient along with you.       Sincerely,    Manasa Sutherland MD

## 2022-06-08 NOTE — PATIENT INSTRUCTIONS
We will see you back in 6 months at the [facility] office with an echocardiogram and office visit. Then we will see you back for an annual follow up in [facility].

## 2022-06-09 ENCOUNTER — TELEPHONE (OUTPATIENT)
Dept: NEUROLOGY | Age: 82
End: 2022-06-09

## 2022-06-09 NOTE — TELEPHONE ENCOUNTER
Pt daughter called. Her dad is having a biopsy done and due to risk of complications from anesthesia, he needs a letter of medical clearance. Pt is not scheduled yet bc they wont schedule this until they get the letter.      Letter to be sent to:   [facility]  fax - [phone]  C/O Dr Amy Herrera

## 2022-06-10 NOTE — TELEPHONE ENCOUNTER
Spoke with yair, informed her that the form was faxed earlier this week. She will check with [facility]. She wanted to know if patient needed to stop is Mestinon or should he continue to take it when he is going to have his surgery.

## 2022-06-27 ENCOUNTER — TRANSCRIBE ORDER (OUTPATIENT)
Dept: SCHEDULING | Age: 82
End: 2022-06-27

## 2022-06-27 DIAGNOSIS — H53.2 DIPLOPIA: Primary | ICD-10-CM

## 2022-06-28 ENCOUNTER — TRANSCRIBE ORDER (OUTPATIENT)
Dept: SCHEDULING | Age: 82
End: 2022-06-28

## 2022-06-28 DIAGNOSIS — G70.00 MYASTHENIA GRAVIS (HCC): Primary | ICD-10-CM

## 2022-06-29 ENCOUNTER — TRANSCRIBE ORDER (OUTPATIENT)
Dept: SCHEDULING | Age: 82
End: 2022-06-29

## 2022-06-29 DIAGNOSIS — H53.2 DIPLOPIA: Primary | ICD-10-CM

## 2022-07-05 ENCOUNTER — TRANSCRIBE ORDER (OUTPATIENT)
Dept: SCHEDULING | Age: 82
End: 2022-07-05

## 2022-07-05 DIAGNOSIS — H53.2 DIPLOPIA: Primary | ICD-10-CM

## 2022-07-05 DIAGNOSIS — C61 PROSTATE CANCER (HCC): ICD-10-CM

## 2022-07-05 DIAGNOSIS — G70.00 MYASTHENIA GRAVIS (HCC): ICD-10-CM

## 2022-09-01 ENCOUNTER — TRANSCRIBE ORDER (OUTPATIENT)
Dept: SCHEDULING | Age: 82
End: 2022-09-01

## 2022-09-01 ENCOUNTER — HOSPITAL ENCOUNTER (OUTPATIENT)
Dept: RADIATION THERAPY | Age: 82
Discharge: HOME | End: 2022-09-01

## 2022-09-01 VITALS — WEIGHT: 169 LBS | BODY MASS INDEX: 28.16 KG/M2 | HEIGHT: 65 IN

## 2022-09-01 DIAGNOSIS — H53.2 DIPLOPIA: ICD-10-CM

## 2022-09-01 DIAGNOSIS — C61 PROSTATE CANCER (HCC): Primary | ICD-10-CM

## 2022-09-01 DIAGNOSIS — H02.402 PTOSIS, LEFT EYELID: ICD-10-CM

## 2022-09-01 DIAGNOSIS — C61 MALIGNANT NEOPLASM OF PROSTATE (HCC): Primary | ICD-10-CM

## 2022-09-01 RX ORDER — LORAZEPAM 1 MG/1
TABLET ORAL
Qty: 2 TABLET | Refills: 0 | Status: SHIPPED | OUTPATIENT
Start: 2022-09-01

## 2022-10-03 ENCOUNTER — HOSPITAL ENCOUNTER (OUTPATIENT)
Dept: PET IMAGING | Age: 82
Discharge: HOME | End: 2022-10-03
Attending: RADIOLOGY
Payer: MEDICARE

## 2022-10-03 VITALS — WEIGHT: 176 LBS | HEIGHT: 65 IN | BODY MASS INDEX: 29.32 KG/M2

## 2022-10-03 DIAGNOSIS — C61 MALIGNANT NEOPLASM OF PROSTATE (HCC): ICD-10-CM

## 2022-10-03 PROCEDURE — 78815 PET IMAGE W/CT SKULL-THIGH: CPT

## 2022-10-12 ENCOUNTER — CLINICAL SUPPORT (OUTPATIENT)
Dept: FAMILY MEDICINE CLINIC | Age: 82
End: 2022-10-12
Payer: MEDICARE

## 2022-10-12 VITALS
OXYGEN SATURATION: 95 % | SYSTOLIC BLOOD PRESSURE: 104 MMHG | HEART RATE: 77 BPM | DIASTOLIC BLOOD PRESSURE: 53 MMHG | RESPIRATION RATE: 18 BRPM | TEMPERATURE: 98.6 F

## 2022-10-12 DIAGNOSIS — Z23 ENCOUNTER FOR IMMUNIZATION: Primary | ICD-10-CM

## 2022-10-12 PROCEDURE — 90694 VACC AIIV4 NO PRSRV 0.5ML IM: CPT | Performed by: FAMILY MEDICINE

## 2022-10-12 PROCEDURE — G0008 ADMIN INFLUENZA VIRUS VAC: HCPCS | Performed by: FAMILY MEDICINE

## 2022-10-13 NOTE — PROGRESS NOTES
If pt agreeable, can be added to my schedule this sat. If not will defer to Dr Birtha Claude. Mr. Marquis Robles presents as a new patient for evaluation of ptosis of left eye.

## 2022-10-14 NOTE — PROGRESS NOTES
Progress Note    Patient: Everton Corado MRN: [id number]  SSN: [id number]    YOB: 1940  Age: 69 y.o. Sex: male        Chief Complaint   Patient presents with    Labs     fasting         Subjective:     Encounter Diagnoses   Name Primary? 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 Overall the patient feels well with good energy level. Key Antihyperglycemic Medications     Patient is on no antihyperglycemic meds. Pertinent Labs:   Lab Results   Component Value Date/Time    Hemoglobin A1c 5.9 (H) 04/16/2018 03:27 PM    Hemoglobin A1c 5.9 (H) 12/15/2017 09:02 AM    Hemoglobin A1c 6.1 (H) 08/14/2017 01:56 PM      Body mass index is 29.38 kg/(m^2). Lab Results   Component Value Date/Time    LDL, calculated 75 04/16/2018 03:27 PM         Lab Results   Component Value Date/Time    Sodium 140 04/16/2018 03:27 PM    Potassium 4.3 04/16/2018 03:27 PM    Chloride 98 04/16/2018 03:27 PM    CO2 25 04/16/2018 03:27 PM    Anion gap 10 10/06/2010 08:58 AM    Glucose 128 (H) 04/16/2018 03:27 PM    BUN 16 04/16/2018 03:27 PM    Creatinine 0.92 04/16/2018 03:27 PM    BUN/Creatinine ratio 17 04/16/2018 03:27 PM    GFR est AA 93 04/16/2018 03:27 PM    GFR est non-AA 81 04/16/2018 03:27 PM    Calcium 9.5 04/16/2018 03:27 PM    AST (SGOT) 20 04/16/2018 03:27 PM    Alk.  phosphatase 54 04/16/2018 03:27 PM    Protein, total 7.0 04/16/2018 03:27 PM    Albumin 4.7 04/16/2018 03:27 PM    Globulin 2.8 10/06/2010 08:58 AM    A-G Ratio 2.0 04/16/2018 03:27 PM    ALT (SGPT) 24 04/16/2018 03:27 PM     Lab Results   Component Value Date/Time    Microalbumin/Creat ratio (mg/g creat) 13 12/08/2009 11:22 AM    Microalb/Creat ratio (ug/mg creat.) 5.7 08/14/2017 01:56 PM    Microalbumin,urine random 1.72 12/08/2009 11:22 AM      Frequency of home glucose testing: No logs   Blood Sugar range at home:    Last eye exam: In past 12 months. We need documentation. Last foot exam: Today   Polyuria, polyphagia or polydipsia: No   Retinopathy: No   Neuropathy SX: No    Low blood sugar symptoms: No   Dietary compliance: Good   Medication compliance:Good   On ASA: Yes   Depression: No   CKD:no     Wt Readings from Last 3 Encounters:   08/17/18 182 lb (82.6 kg)   05/16/18 185 lb (83.9 kg)   04/16/18 190 lb (86.2 kg)        History   Smoking Status    Never Smoker   Smokeless Tobacco    Never Used     Body mass index is 29.38 kg/(m^2). All the patient's questions regarding medications, diet and exercise were answered. Goal of A1C of less than 7.5% is our goal.   Our overall goal is to reduce or eliminate the long term consequences of poorly controlled diabetes. Yes    Type 2 diabetes mellitus with other ophthalmic complication, without long-term current use of insulin ([facility]): Above         Prostate cancer [facility]): No current symptoms.  Chronic systolic congestive heart failure ([facility]): Compensated without symptoms.  Hypothyroidism due to acquired atrophy of thyroid:  Lab Results   Component Value Date/Time    TSH 2.890 04/16/2018 03:27 PM    T4, Free 1.29 04/16/2018 03:27 PM      Denies fatigue, nervousness,weight changes, heat orcold intolerance, bowel changes,skin changes, cardiovascular symptoms, hair loss, feeling excessive energy, tremor, palpitations and weight loss. Thyroid medication has been unchanged since last medication check and labs.          Asymptomatic PVCs: No ectopy today         Current and past medical information:    Current Medications after this visit[de-identified]   Current Outpatient Prescriptions   Medication Sig    simvastatin (ZOCOR) 20 mg tablet TAKE 1 TABLET NIGHTLY    carvedilol (COREG) 3.125 mg tablet TAKE 1 TABLET TWICE A DAY WITH MEALS    trandolapril (MAVIK) 2 mg tablet TAKE 1 TABLET NIGHTLY FOR LEFT VENTRICULAR DYSFUNCTION FOLLOWING MI    levothyroxine (SYNTHROID) 50 mcg tablet TAKE 1 TABLET DAILY BEFORE BREAKFAST    glucose blood VI test strips (FREESTYLE LITE STRIPS) strip Dx E11.9  testing once a day    FREESTYLE LANCETS 28 gauge misc USE TO TEST DAILY    aspirin delayed-release 81 mg tablet Take  by mouth daily.  Cholecalciferol, Vitamin D3, (VITAMIN D3) 1,000 unit cap Take  by mouth daily.  fish oil-dha-epa (FISH OIL) 1,200-144-216 mg Cap Take  by mouth.  VITAMIN B COMPLEX (B COMPLEX PO) Take  by mouth.  PV W-O MIKO/FERROUS FUMARATE/FA (M-VIT PO) Take  by mouth. No current facility-administered medications for this visit.         Patient Active Problem List    Diagnosis Date Noted    Dyslipidemia 05/16/2018    Chronic systolic congestive heart failure ([address] 05/16/2018    Controlled maturity onset diabetes mellitus in young (ROMINA) type 2 with complication ([address] 62/90/7751    Type 2 diabetes, controlled, with retinopathy ([address] 12/15/2015    Mild 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 edema associated with diabetes mellitus due to underlying condition ([address] 12/15/2015    Prostate cancer ([facility] [address] [address] 12/15/2015    Ischemic cardiomyopathy 01/06/2015    [address] spotted fever)     Asymptomatic PVCs 08/21/2013    T2DM (type 2 diabetes mellitus) ([address] 08/21/2013    Hypothyroidism 09/05/2012    Mild nonproliferative diabetic retinopathy ([address] 07/19/2010    Colon polyps 05/31/2010       Past Medical History:   Diagnosis Date    Cardiomyopathy, dilated, nonischemic ([address]     Colon polyps     Hypercholesterolemia     Prostate cancer ([address]     [address], watch and wait strategy    [address] spotted fever)     T2DM (type 2 diabetes mellitus) ([facility] [address] 8/21/2013       Allergies   Allergen Reactions    Pcn [Penicillins] Rash and Swelling    Robitussin [Guaifenesin] Hives       Past Surgical History:   Procedure Laterality Date    CARDIAC SURG PROCEDURE UNLIST      HX HERNIA REPAIR      left inguinal x 2       Social History     Social History    Marital status:      Spouse name: N/A    Number of children: N/A    Years of education: N/A     Social History Main Topics    Smoking status: Never Smoker    Smokeless tobacco: Never Used    Alcohol use No    Drug use: No    Sexual activity: Not Asked     Other Topics Concern    None     Social History Narrative       Review of Systems   Constitutional: Negative. Negative for chills, fever, malaise/fatigue and weight loss. HENT: Negative. Negative for hearing loss. Eyes: Negative. Negative for blurred vision and double vision. Respiratory: Negative. Negative for cough, hemoptysis, sputum production and shortness of breath. Cardiovascular: Negative. Negative for chest pain, palpitations and orthopnea. Gastrointestinal: Negative. Negative for abdominal pain, blood in stool, heartburn, nausea and vomiting. Genitourinary: Negative. Negative for dysuria, frequency and urgency. Musculoskeletal: Negative. Negative for back pain, myalgias and neck pain. Skin: Negative. Negative for rash. Neurological: Negative. Negative for dizziness, tingling, tremors, weakness and headaches. Endo/Heme/Allergies: Negative. Psychiatric/Behavioral: Negative. Negative for depression. Objective:     Vitals:    08/17/18 0828   BP: 129/85   Pulse: 70   Resp: 20   Temp: 98.4 °F (36.9 °C)   TempSrc: Oral   SpO2: 95%   Weight: 182 lb (82.6 kg)   Height: 5' 6\" (1.676 m)      Body mass index is 29.38 kg/(m^2). Physical Exam   Constitutional: He is oriented to person, place, and time and well-developed, well-nourished, and in no distress. HENT:   Head: Normocephalic and atraumatic. Mouth/Throat: Oropharynx is clear and moist.   Eyes: Right eye exhibits no discharge. Left eye exhibits no discharge. No scleral icterus. Neck: No tracheal deviation present. No thyromegaly present. No bruit.    Cardiovascular: Normal rate, regular rhythm and normal heart sounds. Pulmonary/Chest: Effort normal and breath sounds normal.   Abdominal: Soft. Neurological: He is alert and oriented to person, place, and time. Diabetic foot exam:     Left Foot:   Visual Exam: normal    Pulse DP: 2+ (normal)   Filament test: normal sensation    Vibratory sensation: normal      Right Foot:   Visual Exam: normal    Pulse DP: 2+ (normal)   Filament test: normal sensation    Vibratory sensation: normal     Skin: No rash noted. No erythema. Psychiatric: Mood and affect normal.   Nursing note and vitals reviewed. Health Maintenance Due   Topic Date Due    EYE EXAM RETINAL OR DILATED Q1  12/19/2017    Influenza Age 5 to Adult  08/01/2018         Assessment and orders:     Encounter Diagnoses     ICD-10-CM ICD-9-CM   1. 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 Type 2 diabetes mellitus with other ophthalmic complication, without long-term current use of insulin ([facility]) E11.39 250.50   3. Prostate cancer ([facility] [address] C61 185   4. Chronic systolic congestive heart failure (HCC) I50.22 428.22     428.0   5. Hypothyroidism due to acquired atrophy of thyroid E03.4 244.8     246.8   6. Asymptomatic PVCs I49.3 427.69     Diagnoses and all orders for this visit:    1. 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 Type 2 diabetes mellitus with other ophthalmic complication, without long-term current use of insulin ([address]    3. Prostate cancer (HCC)-has routine urology follow-up. -     CBC WITH AUTOMATED DIFF    4.  Chronic systolic congestive heart failure (HCC)-compensated without symptoms. He works daily and is very active. -     METABOLIC PANEL, COMPREHENSIVE  -     CBC WITH AUTOMATED DIFF    5. Hypothyroidism due to acquired atrophy of thyroid-retest  -     T4, FREE    6. Asymptomatic PVCs-controlled            Plan of care:  Discussed diagnoses in detail with patient. Medication risks/benefits/side effects discussed with patient. All of the patient's questions were addressed. The patient understands and agrees with our plan of care. The patient knows to call back if they are unsure of or forget any changes we discussed today or if the symptoms change. The patient received an After-Visit Summary which contains VS, orders, medication list and allergy list. This can be used as a \"mini-medical record\" should they have to seek medical care while out of town. Patient Care Team:  Bruce Lomeli MD as PCP - Yaneli Joshi MD (Urology)  Sonido Pike MD (Cardiology)  Tg Ruiz MD (Ophthalmology)    Follow-up Disposition:  Return in about 4 months (around 12/17/2018).     Future Appointments  Date Time Provider Sarina Mercedesi   11/26/2018 3:40 PM Sonido Pike MD [facility] SCHED   12/19/2018 10:45 AM [facility]       Signed By: Bruce Lomeli MD     August 17, 2018 yellow

## 2022-10-21 NOTE — TELEPHONE ENCOUNTER
Pt requesting [facility] day supplies [facility].  The medication is cheaper w/ 90 day supply than 30

## 2022-10-24 DIAGNOSIS — H02.402 PTOSIS, LEFT EYELID: ICD-10-CM

## 2022-10-24 DIAGNOSIS — H53.2 DIPLOPIA: ICD-10-CM

## 2022-10-24 RX ORDER — PYRIDOSTIGMINE BROMIDE 60 MG/1
TABLET ORAL
Qty: 90 TABLET | Refills: 0 | Status: SHIPPED | OUTPATIENT
Start: 2022-10-24 | End: 2022-10-24

## 2022-10-24 RX ORDER — PYRIDOSTIGMINE BROMIDE 60 MG/1
TABLET ORAL
Qty: 90 TABLET | Refills: 0 | Status: SHIPPED | OUTPATIENT
Start: 2022-10-24

## 2022-10-25 RX ORDER — LEVOTHYROXINE SODIUM 50 UG/1
TABLET ORAL
Qty: 90 TABLET | Refills: 0 | Status: SHIPPED | OUTPATIENT
Start: 2022-10-25

## 2022-10-25 RX ORDER — CARVEDILOL 3.12 MG/1
3.12 TABLET ORAL 2 TIMES DAILY WITH MEALS
Qty: 180 TABLET | Refills: 0 | Status: SHIPPED | OUTPATIENT
Start: 2022-10-25

## 2022-10-25 RX ORDER — TRANDOLAPRIL 2 MG/1
TABLET ORAL
Qty: 90 TABLET | Refills: 0 | Status: SHIPPED | OUTPATIENT
Start: 2022-10-25

## 2022-10-26 ENCOUNTER — OFFICE VISIT (OUTPATIENT)
Dept: FAMILY MEDICINE CLINIC | Age: 82
End: 2022-10-26
Payer: MEDICARE

## 2022-10-26 VITALS
BODY MASS INDEX: 28.76 KG/M2 | SYSTOLIC BLOOD PRESSURE: 136 MMHG | DIASTOLIC BLOOD PRESSURE: 81 MMHG | WEIGHT: 172.6 LBS | TEMPERATURE: 98 F | HEART RATE: 65 BPM | HEIGHT: 65 IN | RESPIRATION RATE: 18 BRPM | OXYGEN SATURATION: 93 %

## 2022-10-26 DIAGNOSIS — E78.00 PURE HYPERCHOLESTEROLEMIA: ICD-10-CM

## 2022-10-26 DIAGNOSIS — E03.4 HYPOTHYROIDISM DUE TO ACQUIRED ATROPHY OF THYROID: ICD-10-CM

## 2022-10-26 DIAGNOSIS — E11.9 DIABETES MELLITUS TYPE 2, DIET-CONTROLLED (HCC): Primary | ICD-10-CM

## 2022-10-26 PROCEDURE — G8510 SCR DEP NEG, NO PLAN REQD: HCPCS | Performed by: FAMILY MEDICINE

## 2022-10-26 PROCEDURE — G8536 NO DOC ELDER MAL SCRN: HCPCS | Performed by: FAMILY MEDICINE

## 2022-10-26 PROCEDURE — 1101F PT FALLS ASSESS-DOCD LE1/YR: CPT | Performed by: FAMILY MEDICINE

## 2022-10-26 PROCEDURE — G8417 CALC BMI ABV UP PARAM F/U: HCPCS | Performed by: FAMILY MEDICINE

## 2022-10-26 PROCEDURE — G8427 DOCREV CUR MEDS BY ELIG CLIN: HCPCS | Performed by: FAMILY MEDICINE

## 2022-10-26 PROCEDURE — 1123F ACP DISCUSS/DSCN MKR DOCD: CPT | Performed by: FAMILY MEDICINE

## 2022-10-26 PROCEDURE — 3044F HG A1C LEVEL LT 7.0%: CPT | Performed by: FAMILY MEDICINE

## 2022-10-26 PROCEDURE — 99214 OFFICE O/P EST MOD 30 MIN: CPT | Performed by: FAMILY MEDICINE

## 2022-10-26 NOTE — PROGRESS NOTES
1. Have you been to the ER, urgent care clinic since your last visit? Hospitalized since your last visit? No    2. Have you seen or consulted any other health care providers outside of the [address] [place] [address] since your last visit? Include any pap smears or colon screening. [facility]     3. For patients aged 39-70: Has the patient had a colonoscopy / FIT/ Cologuard? NA - based on age    If the patient is female:    4. For patients aged 41-77: Has the patient had a mammogram within the past 2 years? NA - based on age or sex      11. For patients aged 21-65: Has the patient had a pap smear? NA - based on age or sex     Reviewed record in preparation for visit and have necessary documentation  Pt did not bring medication to office visit for review  Patient is accompanied by self I have received verbal consent from [facility] to discuss any/all medical information while they are present in the room.     Goals that were addressed and/or need to be completed during or after this appointment include     Health Maintenance Due   Topic Date Due    Eye Exam Retinal or Dilated  01/21/2021    COVID-19 Vaccine (3 - Booster for Moderna series) 05/04/2021    DTaP/Tdap/Td series (2 - Td or Tdap) 05/17/2021    Foot Exam Q1  03/22/2022    A1C test (Diabetic or Prediabetic)  09/01/2022

## 2022-10-27 ENCOUNTER — OFFICE VISIT (OUTPATIENT)
Dept: NEUROLOGY | Age: 82
End: 2022-10-27
Payer: MEDICARE

## 2022-10-27 VITALS
SYSTOLIC BLOOD PRESSURE: 112 MMHG | RESPIRATION RATE: 18 BRPM | BODY MASS INDEX: 28.62 KG/M2 | DIASTOLIC BLOOD PRESSURE: 68 MMHG | OXYGEN SATURATION: 96 % | HEART RATE: 58 BPM | WEIGHT: 172 LBS

## 2022-10-27 DIAGNOSIS — H02.402 PTOSIS, LEFT EYELID: Primary | ICD-10-CM

## 2022-10-27 DIAGNOSIS — R20.0 NUMBNESS AND TINGLING IN BOTH HANDS: ICD-10-CM

## 2022-10-27 DIAGNOSIS — G70.00 MYASTHENIA GRAVIS (HCC): ICD-10-CM

## 2022-10-27 DIAGNOSIS — R20.2 NUMBNESS AND TINGLING IN BOTH HANDS: ICD-10-CM

## 2022-10-27 PROCEDURE — G8432 DEP SCR NOT DOC, RNG: HCPCS | Performed by: PSYCHIATRY & NEUROLOGY

## 2022-10-27 PROCEDURE — G8427 DOCREV CUR MEDS BY ELIG CLIN: HCPCS | Performed by: PSYCHIATRY & NEUROLOGY

## 2022-10-27 PROCEDURE — 1101F PT FALLS ASSESS-DOCD LE1/YR: CPT | Performed by: PSYCHIATRY & NEUROLOGY

## 2022-10-27 PROCEDURE — G8417 CALC BMI ABV UP PARAM F/U: HCPCS | Performed by: PSYCHIATRY & NEUROLOGY

## 2022-10-27 PROCEDURE — 1123F ACP DISCUSS/DSCN MKR DOCD: CPT | Performed by: PSYCHIATRY & NEUROLOGY

## 2022-10-27 PROCEDURE — 99215 OFFICE O/P EST HI 40 MIN: CPT | Performed by: PSYCHIATRY & NEUROLOGY

## 2022-10-27 PROCEDURE — G8536 NO DOC ELDER MAL SCRN: HCPCS | Performed by: PSYCHIATRY & NEUROLOGY

## 2022-10-27 NOTE — PROGRESS NOTES
Chief Complaint   Patient presents with    Follow-up     Ptosis of left eye is stable, no complaints. However complains of tingling and numbness of bilateral hands. HISTORY OF PRESENT ILLNESS  Loretta Aguilera came back for follow-up. He was last seen in March of this year after being referred by ophthalmology for ptosis of the left eye and some double vision. He has had lab work done with Dr. Ronit Amaral for myasthenia gravis and reportedly binding and blocking antibodies were elevated. He has been maintained on pyridostigmine 30 mg 2-3 times daily and it seems to be helping him quite well. Does not get ptosis or diplopia anymore. No other systemic symptoms. Denies any problems with endurance, activity intolerance, shortness of breath etc.  He has been dealing with prostate cancer and is about to start radiation treatments. CT chest was recommended but he has not been able to complete it because of prostate issues. He has been complaining of numbness, tingling sensation and pain in his hands for the past few months and it sometimes wakes him up at night. Current Outpatient Medications   Medication Sig    carvediloL (COREG) 3.125 mg tablet Take 1 Tablet by mouth two (2) times daily (with meals). levothyroxine (synthroid) 50 mcg tablet TAKE 1 TABLET DAILY BEFORE BREAKFAST    trandolapriL (MAVIK) 2 mg tablet TAKE 1 TABLET NIGHTLY FOR LEFT VENTRICULAR DYSFUNCTION FOLLOWING MI    pyridostigmine (MESTINON) 60 mg tablet TAKE 1 TABLET BY MOUTH 3 TIMES A DAY    LORazepam (ATIVAN) 1 mg tablet Take 1 tablet 1 hour prior to MRI. May repeat x1 if needed    FreeStyle Lite Strips strip USE TO TEST ONCE DAILY    rosuvastatin (CRESTOR) 10 mg tablet TAKE 1 TABLET DAILY (Patient not taking: Reported on 10/26/2022)    FREESTYLE LANCETS 28 gauge misc USE TO TEST DAILY    aspirin delayed-release 81 mg tablet Take  by mouth daily. cholecalciferol (VITAMIN D3) 25 mcg (1,000 unit) cap Take  by mouth daily.     fish oil-dha-epa 1,200-144-216 mg cap Take  by mouth. VITAMIN B COMPLEX (B COMPLEX PO) Take  by mouth. PV W-O MIKO/FERROUS FUMARATE/FA (M-VIT PO) Take  by mouth. No current facility-administered medications for this visit. PHYSICAL EXAMINATION:    Visit Vitals  /68   Pulse (!) 58   Resp 18   Wt 172 lb (78 kg)   SpO2 96%   BMI 28.62 kg/m²       NEUROLOGICAL EXAMINATION:     Mental Status:   Alert and oriented to person, place, and time with recent and remote memory intact. Attention span and concentration are normal. Speech is fluent. Cranial Nerves:    II, III, IV, VI:  Visual acuity grossly intact. Visual fields are normal.    Pupils are equal, round, and reactive to light. Extra-ocular movements are full and fluid. Fundoscopic exam was benign. There is left-sided ptosis   V-XII: Hearing is grossly intact. Facial features are symmetric, with normal sensation and strength. The palate rises symmetrically and the tongue protrudes midline. Sternocleidomastoids 5/5. Motor Examination: Normal tone, bulk, and strength. 5/5 muscle strength throughout. No cogwheel rigidity or clonus present. Sensory exam:  Normal throughout to pinprick, temperature, and vibration sense. Normal proprioception. Coordination:  Finger to nose and rapid arm movement testing was normal.   No resting or intention tremor    Gait and Station:  Steady while walking on toes, heels, and with tandem walking. Normal arm swing. No Rhomberg or pronator drift. No muscle wasting or fasiculations noted. Reflexes:  DTRs 2+ throughout. Toes downgoing.         LABS / IMAGING  Lab Results   Component Value Date/Time    WBC 8.0 09/22/2021 10:35 AM    HGB 15.7 03/01/2022 08:55 AM    HCT 49.1 03/01/2022 08:55 AM    PLATELET 010 43/22/0557 10:35 AM    .2 (H) 09/22/2021 10:35 AM     Lab Results   Component Value Date/Time    Sodium 136 03/01/2022 08:55 AM    Potassium 5.0 03/01/2022 08:55 AM    Chloride 103 03/01/2022 08:55 AM    CO2 30 03/01/2022 08:55 AM    Anion gap 3 (L) 03/01/2022 08:55 AM    Glucose 122 (H) 03/01/2022 08:55 AM    BUN 17 03/01/2022 08:55 AM    Creatinine 1.12 03/01/2022 08:55 AM    BUN/Creatinine ratio 15 03/01/2022 08:55 AM    GFR est AA >60 03/01/2022 08:55 AM    GFR est non-AA >60 03/01/2022 08:55 AM    Calcium 10.3 (H) 03/01/2022 08:55 AM    Bilirubin, total 0.9 03/01/2022 08:55 AM    Alk. phosphatase 50 03/01/2022 08:55 AM    Protein, total 7.5 03/01/2022 08:55 AM    Albumin 4.6 03/01/2022 08:55 AM    Globulin 2.9 03/01/2022 08:55 AM    A-G Ratio 1.6 03/01/2022 08:55 AM    ALT (SGPT) 23 03/01/2022 08:55 AM    AST (SGOT) 19 03/01/2022 08:55 AM     Lab Results   Component Value Date/Time    TSH 2.56 09/22/2021 10:35 AM     Lab Results   Component Value Date/Time    Vitamin B12 571 03/01/2022 08:55 AM    Folate >24.0 (H) 03/12/2009 02:15 PM     MRI Results (most recent):  Results from [place] encounter on 03/07/22    MRI BRAIN W WO CONT    Narrative  EXAM:  MRI BRAIN W WO CONT    INDICATION:    Diplopia, left eyelid ptosis. COMPARISON:  None. CONTRAST: 15 ml ProHance. TECHNIQUE:  Multiplanar multisequence acquisition without and with contrast of the brain and  orbits. FINDINGS:  The globes are normal. The optic nerves are normal in size and signal. The  extraocular muscles are normal. No evidence of intraorbital mass or abnormal  intraorbital enhancement. The ventricles are normal in size and position. The brain parenchyma has normal  signal characteristics. There is no acute infarct, hemorrhage, extra-axial fluid  collection, or mass effect. There is no cerebellar tonsillar herniation. Expected arterial flow-voids are present. No evidence of abnormal enhancement. Mild mucosal thickening in a hypoplastic right maxillary sinus. Small bilateral  mastoid effusions. No significant osseous or scalp lesions are identified. Impression  1.  Normal appearance of the orbits. Normal brain MRI. 2. Small bilateral mastoid effusions. Imaging reviewed    ASSESSMENT    ICD-10-CM ICD-9-CM    1. Ptosis, left eyelid  H02.402 374.30       2. Myasthenia gravis ([facility] [address]  G70.00 358.00       3. Numbness and tingling in both hands  R20.0 782. 0 EMG NCV MOTOR WO F/WAVE PER NERVE    R20.2            DISCUSSION  Mr. Franck Arnada he developed rather abrupt ptosis of the left eye in February of this year and was having some diplopia. As per ophthalmology records, he tested positive for myasthenia gravis binding and blocking antibodies. I do not have these results for my review and were requested again  He has responded quite well to a relatively low-dose of pyridostigmine i.e. 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 Fortunately he does not have any systemic symptoms and he is doing well on a relatively low-dose of pyridostigmine. 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   We will schedule him for EMG/NCV testing    Total time 40 minutes    Steven Sinclair MD  Diplomate, American Board of Psychiatry & Neurology (Neurology)  [place]par[place] Board of Psychiatry & Neurology (Clinical Neurophysiology)  Diplomate, American Board of Electrodiagnostic Medicine

## 2022-10-31 NOTE — PROGRESS NOTES
Progress Note    Patient: Alix Medrano MRN: [id number]  SSN: [id number]    YOB: 1940  Age: 81 y.o. Sex: male        Chief Complaint   Patient presents with    Follow-up     Follow up and lab      he is a 81y.o. year old male who presents for follow up of chronic health conditions. PAtient with hx of T2D, hypothyroidism, HLD and CHF. He is due for lab work. Patient denies HA, dizziness, SOB, CP, abdominal pain, dysuria, acute myalgias or arthralgias. Encounter Diagnoses   Name Primary? Diabetes mellitus type 2, diet-controlled ([facility] Yes    Pure hypercholesterolemia     Hypothyroidism due to acquired atrophy of thyroid      Diabetes: This patient is being treating under a comprehensive plan of care for diabetes. Overall the patient feels well with good energy level. Insulin dependence: no   Pertinent Labs:   Lab Results   Component Value Date/Time    Hemoglobin A1c 5.9 (H) 03/01/2022 08:55 AM      Body mass index is 28.72 kg/m². Lab Results   Component Value Date/Time    LDL, calculated 106 (H) 03/01/2022 08:55 AM        Wt Readings from Last 3 Encounters:   10/27/22 172 lb (78 kg)   10/26/22 172 lb 9.6 oz (78.3 kg)   10/03/22 176 lb (79.8 kg)        Social History     Tobacco Use   Smoking Status Never   Smokeless Tobacco Never        Medications, diet and exercise as means of diabetic control with a goal of an A1C of less than 7.0% discussed. Diabetic foot care and annual eye exam discussed as well. Call immediately if having symptoms of high sugar (frequent urination, always thirsty) or low sugar (dizzy, lethargic, sweaty, nauseated, headache). Our overall goal is to reduce or eliminate the long term consequences of poorly controlled diabetes. Patient expresses understanding and agreement with our plan of care.       Patient Active Problem List   Diagnosis Code    Colon polyps K63.5    Hypothyroidism E03.9    Asymptomatic PVCs I49.3    [facility] [place] A77.0 Ischemic cardiomyopathy I25.5    Mild 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 edema associated with diabetes mellitus due to underlying condition ([facility] N57.9672    Prostate cancer ([facility] C61    Controlled maturity onset diabetes mellitus in young (ROMINA) type 2 with complication (HCC) P51.8    Dyslipidemia E78.5    Chronic systolic congestive heart failure (HCC) I50.22    Statin intolerance Z78.9     Past Surgical History:   Procedure Laterality Date    HX HERNIA REPAIR      left inguinal x 2    MO CARDIAC SURG PROCEDURE UNLIST       Social History     Socioeconomic History    Marital status:      Spouse name: Not on file    Number of children: Not on file    Years of education: Not on file    Highest education level: Not on file   Occupational History    Not on file   Tobacco Use    Smoking status: Never    Smokeless tobacco: Never   Substance and Sexual Activity    Alcohol use: No    Drug use: No    Sexual activity: Not on file   Other Topics Concern    Not on file   Social History Narrative    Not on file     Social Determinants of Health     Financial Resource Strain: Low Risk     Difficulty of Paying Living Expenses: Not very hard   Food Insecurity: No Food Insecurity    Worried About Running Out of Food in the Last Year: Never true    Ran Out of Food in the Last Year: Never true   Transportation Needs: Not on file   Physical Activity: Not on file   Stress: Not on file   Social Connections: Not on file   Intimate Partner Violence: Not on file   Housing Stability: Not on file     Family History   Problem Relation Age of Onset    Cancer Father         GI    Hypertension Mother     Diabetes Mother     Cancer Sister         breast     Current Outpatient Medications   Medication Sig    carvediloL (COREG) 3.125 mg tablet Take 1 Tablet by mouth two (2) times daily (with meals).     levothyroxine (synthroid) 50 mcg tablet TAKE 1 TABLET DAILY BEFORE BREAKFAST    trandolapriL (MAVIK) 2 mg tablet TAKE 1 TABLET NIGHTLY FOR LEFT VENTRICULAR DYSFUNCTION FOLLOWING MI    pyridostigmine (MESTINON) 60 mg tablet TAKE 1 TABLET BY MOUTH 3 TIMES A DAY    LORazepam (ATIVAN) 1 mg tablet Take 1 tablet 1 hour prior to MRI. May repeat x1 if needed    FreeStyle Lite Strips strip USE TO TEST ONCE DAILY    FREESTYLE LANCETS 28 gauge misc USE TO TEST DAILY    aspirin delayed-release 81 mg tablet Take  by mouth daily. cholecalciferol (VITAMIN D3) 25 mcg (1,000 unit) cap Take  by mouth daily. fish oil-dha-epa 1,200-144-216 mg cap Take  by mouth. VITAMIN B COMPLEX (B COMPLEX PO) Take  by mouth. PV W-O MIKO/FERROUS FUMARATE/FA (M-VIT PO) Take  by mouth. rosuvastatin (CRESTOR) 10 mg tablet TAKE 1 TABLET DAILY (Patient not taking: Reported on 10/26/2022)     No current facility-administered medications for this visit. Allergies   Allergen Reactions    Pcn [Penicillins] Rash and Swelling    Robitussin [Guaifenesin] Hives       Review of Systems:  Constitutional: Negative for fatigue, malaise  Resp: Negative for cough, wheezing or SOB  CV: Negative for chest pain, dizziness or palpitations  GI: Negative for nausea or abdominal pain  MS: Negative for acute myalgias or arthralgias   Neuro: Negative for HA, weakness or paresthesia  Psych: Negative for depression or anxiety     Vitals:    10/26/22 1602   BP: 136/81   Pulse: 65   Resp: 18   Temp: 98 °F (36.7 °C)   TempSrc: Oral   SpO2: 93%   Weight: 172 lb 9.6 oz (78.3 kg)   Height: 5' 5\" (1.651 m)       Physical Examination:  General: Well developed, well nourished, in no acute distress  Head: Normocephalic, atraumatic  Eyes: Sclera clear, EOMI  Neck: Normal range of motion  Respiratory: symmetrical, unlabored effort  Cardiovascular: Regular rate and rhythm  Extremities: Full range of motion, normal gait  Neurologic: No focal deficits  Psych: Active, alert and oriented. Affect appropriate       ICD-10-CM ICD-9-CM    1.  Diabetes mellitus type 2, diet-controlled ([facility]  E11.9 250. 00 LIPID PANEL      METABOLIC PANEL, COMPREHENSIVE      TSH 3RD GENERATION      HEMOGLOBIN A1C WITH EAG      2. Pure hypercholesterolemia  E78.00 272.0 LIPID PANEL      3. Hypothyroidism due to acquired atrophy of thyroid  E03.4 244.8 TSH 3RD GENERATION     246.8           Plan of care:  Diagnoses were discussed in detail with patient. Medications reviewed and appropriate. Patient to continue current prescribed medications as written. Medication risks/benefits/side effects discussed with patient. All of the patient's questions were addressed and answered to apparent satisfaction. The patient understands and agrees with our plan of care. The patient knows to call back if they have questions about the plan of care or if symptoms change. The patient received an After-Visit Summary which contains VS, diagnoses, orders, allergy and medication lists. Future Appointments   Date Time Provider Sarina Richards   12/16/2022 11:00 AM MAK TAYLOR BS AMB   12/16/2022 12:00 PM MD MICHELLE Ahumada BS AMB   4/26/2023  8:40 AM Marcio Hilton MD [facility] BS AMB   6/14/2023 10:00 AM Marek Mercado MD [facility] BS AMB           Follow-up and Dispositions    Return in about 6 months (around 4/26/2023), or if symptoms worsen or fail to improve.

## 2022-11-01 ENCOUNTER — TELEPHONE (OUTPATIENT)
Dept: NEUROLOGY | Age: 82
End: 2022-11-01

## 2022-11-10 ENCOUNTER — DOCUMENTATION ONLY (OUTPATIENT)
Dept: NEUROLOGY | Age: 82
End: 2022-11-10

## 2022-11-10 NOTE — PROGRESS NOTES
Lab results reviewed.   T4 and TSH normal  Acetylcholine receptor binding antibodies elevated at 4.6, normal range 0-0.24  Acetylcholine receptor blocking antibodies elevated at 45 normal range 0-25  Thyroid peroxidase antibody negative

## 2022-11-14 ENCOUNTER — OFFICE VISIT (OUTPATIENT)
Dept: NEUROLOGY | Age: 82
End: 2022-11-14
Payer: MEDICARE

## 2022-11-14 DIAGNOSIS — G56.23 ULNAR NEUROPATHY OF BOTH UPPER EXTREMITIES: ICD-10-CM

## 2022-11-14 DIAGNOSIS — G56.03 BILATERAL CARPAL TUNNEL SYNDROME: Primary | ICD-10-CM

## 2022-11-14 PROCEDURE — 95886 MUSC TEST DONE W/N TEST COMP: CPT | Performed by: PSYCHIATRY & NEUROLOGY

## 2022-11-14 PROCEDURE — 95911 NRV CNDJ TEST 9-10 STUDIES: CPT | Performed by: PSYCHIATRY & NEUROLOGY

## 2022-11-14 NOTE — PROGRESS NOTES
[facility]  [address], [address]  Phone [phone] Fax [phone]  Test Date:  2022    Patient: Aury Jones : 1940 Physician: Verlin Schirmer, MD   Sex: Male Height: 5' 5\" Ref Phys: Verlin Schirmer, MD   ID#: [id number]  Weight: 172 lbs. Technician: Estee Bella     Patient Complaints:      Patient History / Exam:  20-year-old male who is being evaluated for numbness and tingling sensation in both hands, right worse than left. Past medical history includes myasthenia gravis. On exam he has visible atrophy of the thenar eminence of the right hand otherwise exam is unremarkable      NCV & EMG Findings:  Evaluation of the left median motor nerve showed prolonged distal onset latency (8.0 ms) and reduced amplitude (2.4 mV). The right median motor nerve showed prolonged distal onset latency (4.5 ms), reduced amplitude (2.2 mV), and decreased conduction velocity (Elbow-Wrist, 33 m/s). The left ulnar motor nerve showed decreased conduction velocity (B Elbow-Wrist, 51 m/s) and decreased conduction velocity (A Elbow-B Elbow, 40 m/s). The right ulnar motor nerve showed decreased conduction velocity (A Elbow-B Elbow, 43 m/s). The left median sensory and the right median sensory nerves showed no response (Wrist). The left median/ulnar (palm) comparison and the right median/ulnar (palm) comparison nerves showed no response (Median Palm). All remaining nerves  were within normal limits. Needle evaluation of the left abductor pollicis brevis muscle showed slightly increased spontaneous activity, increased motor unit amplitude, increased polyphasic potentials, and diminished recruitment. The right abductor pollicis brevis muscle showed increased insertional activity, widespread spontaneous activity, and diminished recruitment.   All remaining muscles (as indicated in the following table) showed no evidence of electrical instability. Impression:    The electrodiagnostic testing is suggestive of:  1. Bilateral entrapment median mononeuropathy i.e. carpal tunnel syndrome at both wrists. This is severe on the right and moderately severe on the left. Profuse denervation was seen in the right abductor pollicis brevis muscle with only few motor units. Mild to moderate chronic neurogenic changes were seen in the left abductor pollicis brevis muscle  2. 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 cubital tunnel syndrome. This is mainly affecting the motor fibers and the lesion appears predominantly demyelinating. No significant denervation was seen in ulnar innervated muscles  3.   No evidence to suggest a cervical radiculopathy    Recommendations:  Referral to hand surgery to consider carpal tunnel release    ___________________________  Earnest Mcwilliams MD        Nerve Conduction Studies  Anti Sensory Summary Table     Stim Site NR Peak (ms) Norm Peak (ms) P-T Amp (µV) Norm P-T Amp Onset (ms) Site1 Site2 Delta-P (ms) Dist (cm) Dave (m/s) Norm Dave (m/s)   Left Median Anti Sensory (2nd Digit)  32.3°C   Wrist NR  <3.6  >10  Wrist 2nd Digit  14.0  >39   Right Median Anti Sensory (2nd Digit)  32.1°C   Wrist NR  <3.6  >10  Wrist 2nd Digit  14.0  >39   Left Radial Anti Sensory (Base 1st Digit)  32.6°C   Wrist    2.3 <3.1 18.4  2.0 Wrist Base 1st Digit 2.3 0.0     Right Radial Anti Sensory (Base 1st Digit)  32.2°C   Wrist    2.2 <3.1 40.8  1.7 Wrist Base 1st Digit 2.2 0.0     Left Ulnar Anti Sensory (5th Digit)  32.4°C   Wrist    3.5 <3.7 17.6 >15.0 2.5 Wrist 5th Digit 3.5 14.0 40 >38   Right Ulnar Anti Sensory (5th Digit)  32.4°C   Wrist    2.8 <3.7 31.4 >15.0 2.1 Wrist 5th Digit 2.8 14.0 50 >38     Motor Summary Table     Stim Site NR Onset (ms) Norm Onset (ms) O-P Amp (mV) Norm O-P Amp Site1 Site2 Delta-0 (ms) Dist (cm) Dave (m/s) Norm Dave (m/s)   Left Median Motor ([facility]d [facility]v)  32.7°C   Wrist    8.0 <4.2 2.4 >5 Elbow Wrist 3.5 26.0 74 >50   Elbow    11.5  1.2          Right Median Motor (Abd Poll Brev)  32.2°C   Wrist    4.5 <4.2 2.2 >5 Elbow Wrist 7.8 26.0 33 >50   Elbow    12.3  1.2          Left Ulnar Motor (Abd Dig Minimi)  32.9°C   Wrist    3.1 <4.2 10.2 >3 B Elbow Wrist 3.5 18.0 51 >53   B Elbow    6.6  9.8  A Elbow B Elbow 2.5 10.0 40 >53   A Elbow    9.1  9.7          Right Ulnar Motor (Abd Dig Minimi)  32.8°C   Wrist    3.3 <4.2 9.2 >3 B Elbow Wrist 3.0 18.0 60 >53   B Elbow    6.3  8.6  A Elbow B Elbow 2.3 10.0 43 >53   A Elbow    8.6  8.5            Comparison Summary Table     Stim Site NR Peak (ms) Norm Peak (ms) P-T Amp (µV) Site1 Site2 Delta-P (ms) Norm Delta (ms)   Left Median/Ulnar Palm Comparison (Wrist - 8cm)  32.8°C   Median Palm NR  <2.5  Median Palm Ulnar Palm  <0.3   Ulnar Palm    1.7 <2.5 6.3       Right Median/Ulnar Palm Comparison (Wrist - 8cm)  32.7°C   Median Palm NR  <2.5  Median Palm Ulnar Palm  <0.3   Ulnar Palm    2.1 <2.5 4.1         EMG     Side Muscle Nerve Root Ins Act Fibs Psw Amp Dur Poly Recrt Int Pat Comment   Left Abd Poll Brev Median C8-T1 Nml Nml 1+ Incr Nml 3+ Reduced Nml    Left 1stDorInt Ulnar C8-T1 Nml Nml Nml Nml Nml 0 Nml Nml    Left BrachioRad Radial C5-6 Nml Nml Nml Nml Nml 0 Nml Nml    Left PronatorTeres Median C6-7 Nml Nml Nml Nml Nml 0 Nml Nml    Left Triceps Radial C6-7-8 Nml Nml Nml Nml Nml 0 Nml Nml    Left Deltoid Axillary C5-6 Nml Nml Nml Nml Nml 0 Nml Nml    Left Cervical Parasp Mid Rami C4-6 Nml Nml Nml         Right Abd Poll Brev Median C8-T1 Incr Nml 4+ Nml Nml 0 Reduced Nml    Right 1stDorInt Ulnar C8-T1 Nml Nml Nml Nml Nml 0 Nml Nml    Right BrachioRad Radial C5-6 Nml Nml Nml Nml Nml 0 Nml Nml    Right PronatorTeres Median C6-7 Nml Nml Nml Nml Nml 0 Nml Nml    Right Triceps Radial C6-7-8 Nml Nml Nml Nml Nml 0 Nml Nml    Right Deltoid Axillary C5-6 Nml Nml Nml Nml Nml 0 Nml Nml    Right Cervical Parasp Mid Rami C4-6 Nml Nml Nml         Left FlexDigProf Ulnar C8,T1 Nml Nml Nml Nml Nml 0 Nml Nml    Right FlexDigProf Ulnar C8,T1 Nml Nml Nml Nml Nml 0 Nml Nml          Waveforms:

## 2022-12-16 ENCOUNTER — OFFICE VISIT (OUTPATIENT)
Dept: CARDIOLOGY CLINIC | Age: 82
End: 2022-12-16
Payer: MEDICARE

## 2022-12-16 ENCOUNTER — ANCILLARY PROCEDURE (OUTPATIENT)
Dept: CARDIOLOGY CLINIC | Age: 82
End: 2022-12-16
Payer: MEDICARE

## 2022-12-16 VITALS
SYSTOLIC BLOOD PRESSURE: 120 MMHG | WEIGHT: 172.2 LBS | DIASTOLIC BLOOD PRESSURE: 70 MMHG | BODY MASS INDEX: 28.69 KG/M2 | HEIGHT: 65 IN

## 2022-12-16 VITALS
BODY MASS INDEX: 28.82 KG/M2 | HEIGHT: 65 IN | DIASTOLIC BLOOD PRESSURE: 70 MMHG | SYSTOLIC BLOOD PRESSURE: 124 MMHG | OXYGEN SATURATION: 99 % | HEART RATE: 80 BPM | RESPIRATION RATE: 14 BRPM | WEIGHT: 173 LBS

## 2022-12-16 DIAGNOSIS — I50.22 CHRONIC SYSTOLIC CONGESTIVE HEART FAILURE (HCC): ICD-10-CM

## 2022-12-16 DIAGNOSIS — E78.5 DYSLIPIDEMIA: ICD-10-CM

## 2022-12-16 DIAGNOSIS — Z78.9 STATIN INTOLERANCE: ICD-10-CM

## 2022-12-16 DIAGNOSIS — I25.5 ISCHEMIC CARDIOMYOPATHY: ICD-10-CM

## 2022-12-16 DIAGNOSIS — I25.10 CORONARY ARTERY DISEASE INVOLVING NATIVE CORONARY ARTERY OF NATIVE HEART WITHOUT ANGINA PECTORIS: Primary | ICD-10-CM

## 2022-12-16 DIAGNOSIS — E13.8: ICD-10-CM

## 2022-12-16 PROCEDURE — G8417 CALC BMI ABV UP PARAM F/U: HCPCS | Performed by: SPECIALIST

## 2022-12-16 PROCEDURE — G8432 DEP SCR NOT DOC, RNG: HCPCS | Performed by: SPECIALIST

## 2022-12-16 PROCEDURE — 99214 OFFICE O/P EST MOD 30 MIN: CPT | Performed by: SPECIALIST

## 2022-12-16 PROCEDURE — 1101F PT FALLS ASSESS-DOCD LE1/YR: CPT | Performed by: SPECIALIST

## 2022-12-16 PROCEDURE — G8536 NO DOC ELDER MAL SCRN: HCPCS | Performed by: SPECIALIST

## 2022-12-16 PROCEDURE — G8427 DOCREV CUR MEDS BY ELIG CLIN: HCPCS | Performed by: SPECIALIST

## 2022-12-16 PROCEDURE — 93000 ELECTROCARDIOGRAM COMPLETE: CPT | Performed by: SPECIALIST

## 2022-12-16 PROCEDURE — 1123F ACP DISCUSS/DSCN MKR DOCD: CPT | Performed by: SPECIALIST

## 2022-12-16 PROCEDURE — 3044F HG A1C LEVEL LT 7.0%: CPT | Performed by: SPECIALIST

## 2022-12-16 NOTE — PROGRESS NOTES
Afia Cedillo     1940       Ting Win MD, [facility]  Date of Visit-12/16/2022   PCP is Shonna Royal MD   [facility] [place]  [facility]  HPI:  Afia Cedillo is a 81 y.o. male   6 month fu  Reduced EF, CHF with Ischemic CMY  MRI 2014 showed RCA infarct. He had dense subendocardial infarct in the basal and mid inferolateral and lateral walls and MRI EF was 42%;  he has declined ICD. Echo 2013 showed EF 30-35% global hypokinesia stress echo showed poor exercise capacity in a cath 9/24/2013 showed mild plaque with an EDP of 16 and no AV gradient  echo in December 2020 showed EF 30-35%. Mild septal wall hypertrophy. Mild posterior wall hypertrophy. RV: Mildly reduced systolic function. · AO: Mild ascending aorta dilatation. Ascending aorta diameter = 4.3 cm. · AV: Moderate aortic valve sclerosis with no significant stenosis. · MV: Mild mitral valve regurgitation is present. · PA: Pulmonary arterial systolic pressure is 29 mmHg. XOL-gets joint pain if takes every day  Murmur with aortic sclerosis  PVCs, first degree AV block    Pt is a  on a racing crew-now retired. Today - no complaintsSeems very active person  Denies chest pain, edema, syncope or shortness of breath at rest, has no tachycardia, palpitations or sense of arrhythmia. Feels well  Cant take statin daily due to myalgia but doing fine every 2-3 days with the pill  EKG sinus rhythm first-degree heart block right bundle branch block with left axis deviation bifascicular block    Assessment/Plan:     There are no Patient Instructions on file for this visit. No changes , fu six months in [place]  1. CAD native no angina  Prior infarct  In the RCA. EF is unchanged and remains NYHA 1. No changes in current medications. It is entering that he really did have other plaque so he may have had a clot in that area that led to the infarction.   Continue aspirin statin beta-blocker  Key CAD CHF Meds               carvediloL (COREG) 3.125 mg tablet Take 1 Tablet by mouth two (2) times daily (with meals). trandolapriL (MAVIK) 2 mg tablet TAKE 1 TABLET NIGHTLY FOR LEFT VENTRICULAR DYSFUNCTION FOLLOWING MI    rosuvastatin (CRESTOR) 10 mg tablet TAKE 1 TABLET DAILY    aspirin delayed-release 81 mg tablet Take  by mouth daily. fish oil-dha-epa 1,200-144-216 mg cap Take  by mouth. 2. Chronic systolic congestive heart failure ([facility]  Compensated. EF 35% NYHA I  Continue Coreg 3.125 mg twice daily and Mavik 2 mg daily-BB and ACE  Has PVCs but declines AICD    3. Dyslipidemia  On Crestor, limited to every 2-3 days due to myalgia, which is fine    Key Antihyperlipidemia Meds               rosuvastatin (CRESTOR) 10 mg tablet TAKE 1 TABLET DAILY    fish oil-dha-epa 1,200-144-216 mg cap Take  by mouth. Lab Results   Component Value Date/Time    LDL, calculated 106 (H) 03/01/2022 08:55 AM       4 . Statin intolerance-as above  5 . Controlled maturity onset diabetes mellitus in young (ROMINA) type 2 with complication ([facility])  Lab Results   Component Value Date/Time    Hemoglobin A1c 5.9 (H) 03/01/2022 08:55 AM         Impression:   1. Coronary artery disease involving native coronary artery of native heart without angina pectoris    2. Chronic systolic congestive heart failure ([facility]    3. Dyslipidemia    4. Statin intolerance    5.  Controlled maturity onset diabetes mellitus in young (ROMINA) type 2 with complication ([facility]       Cardiac History:   Echo 9/2013 - global HK, EF 30-35%  Stress echo 9/2013- poor exercise capacity, resting global HK, EF 30%, no enhancement of EF with exercise  Cath 9/24/2013 - EDP 16, no AV gradient, LV dilated, EF 25-30% global, LM large nl, LAD mild plaque, LCX nl, RCA normal.    Echo 5/15/2014 - EF 30-35%, global HK    Cardiac MRI 6/2014 - Dense medium size subendocardial infarct of the basal and mid   inferolateral and lateral walls (primarily mid segments) with myocardial   wall thinning and fatty metaplasia of the infarct suggest chronic non   reperfusing infarct. No significant viability in these segments. All other   segments are completely viable. LVEF 42%. Echo 1/5/15 - EF 30-35%. inferior and inferolateral akinesis, mild MR    Echo 5/24/18 - EF 35-40%. Mod HK of basal inferior walls. Mild LVH. Mild TR. AoV sclerosis without stenosis. Aortic root mild dilatation; ascending aorta 3.9cm. Improvement in LF function when compared to study 1/2015. Echo 12/9/19 - EF 30-35%, severe inferior hypokinesis     Future Appointments   Date Time Provider Sarina Richards   4/26/2023  8:40 AM Guillermina Lei MD [facility] BS AMB   6/14/2023 10:00 AM Ting Hairston MD [facility] BS AMB        ROS-except as noted above. . A complete cardiac and respiratory are reviewed and negative except as above ; Resp-denies wheezing  or productive cough,. Const- No unusual weight loss or fever; Neuro-no recent seizure or CVA ; GI- No BRBPR, abdom pain, bloating ; - no  hematuria   see supplement sheet, initialed and to be scanned by staff  Past Medical History:   Diagnosis Date    Cardiomyopathy, dilated, nonischemic ([facility]     Colon polyps     Hypercholesterolemia     Prostate cancer ([facility]     Cielo 6, watch and wait strategy    [facility] spotted fever)     T2DM (type 2 diabetes mellitus) ([facility] 8/21/2013      Social Hx= reports that he has never smoked. He has never used smokeless tobacco. He reports that he does not drink alcohol and does not use drugs. Exam and Labs:  /70 (BP 1 Location: Left upper arm, BP Patient Position: Sitting)   Pulse 80   Resp 14   Ht 5' 5\" (1.651 m)   Wt 173 lb (78.5 kg)   SpO2 99%   BMI 28.79 kg/m² Constitutional:  NAD, comfortable  Head: NC,AT. Eyes: No scleral icterus. Neck:  Neck supple. No JVD present. Throat: moist mucous membranes. Chest: Effort normal & normal respiratory excursion . Neurological: alert, conversant and oriented . Skin: Skin is not cold. No obvious systemic rash noted. Not diaphoretic. No erythema. Psychiatric:  Grossly normal mood and affect. Behavior appears normal. Extremities:  no clubbing or cyanosis. Abdomen: non distended    Lungs:breath sounds normal. No stridor. distress, wheezes or  Rales. Heart: normal rate, regular rhythm, normal S1, S2, no murmurs, rubs, clicks or gallops , PMI non displaced. Edema: Edema is none. Lab Results   Component Value Date/Time    Cholesterol, total 172 03/01/2022 08:55 AM    HDL Cholesterol 48 03/01/2022 08:55 AM    LDL, calculated 106 (H) 03/01/2022 08:55 AM    Triglyceride 90 03/01/2022 08:55 AM    CHOL/HDL Ratio 3.6 03/01/2022 08:55 AM     Lab Results   Component Value Date/Time    Sodium 136 03/01/2022 08:55 AM    Potassium 5.0 03/01/2022 08:55 AM    Chloride 103 03/01/2022 08:55 AM    CO2 30 03/01/2022 08:55 AM    Anion gap 3 (L) 03/01/2022 08:55 AM    Glucose 122 (H) 03/01/2022 08:55 AM    BUN 17 03/01/2022 08:55 AM    Creatinine 1.12 03/01/2022 08:55 AM    BUN/Creatinine ratio 15 03/01/2022 08:55 AM    GFR est AA >60 03/01/2022 08:55 AM    GFR est non-AA >60 03/01/2022 08:55 AM    Calcium 10.3 (H) 03/01/2022 08:55 AM      Wt Readings from Last 3 Encounters:   12/16/22 172 lb 3.2 oz (78.1 kg)   10/27/22 172 lb (78 kg)   10/26/22 172 lb 9.6 oz (78.3 kg)      BP Readings from Last 3 Encounters:   12/16/22 120/70   10/27/22 112/68   10/26/22 136/81      Current Outpatient Medications   Medication Sig    carvediloL (COREG) 3.125 mg tablet Take 1 Tablet by mouth two (2) times daily (with meals). levothyroxine (synthroid) 50 mcg tablet TAKE 1 TABLET DAILY BEFORE BREAKFAST    trandolapriL (MAVIK) 2 mg tablet TAKE 1 TABLET NIGHTLY FOR LEFT VENTRICULAR DYSFUNCTION FOLLOWING MI    pyridostigmine (MESTINON) 60 mg tablet TAKE 1 TABLET BY MOUTH 3 TIMES A DAY    LORazepam (ATIVAN) 1 mg tablet Take 1 tablet 1 hour prior to MRI.   May repeat x1 if needed    FreeStyle Lite Strips strip USE TO TEST ONCE DAILY    rosuvastatin (CRESTOR) 10 mg tablet TAKE 1 TABLET DAILY (Patient not taking: Reported on 10/26/2022)    FREESTYLE LANCETS 28 gauge misc USE TO TEST DAILY    aspirin delayed-release 81 mg tablet Take  by mouth daily. cholecalciferol (VITAMIN D3) 25 mcg (1,000 unit) cap Take  by mouth daily. fish oil-dha-epa 1,200-144-216 mg cap Take  by mouth. VITAMIN B COMPLEX (B COMPLEX PO) Take  by mouth. PV W-O MIKO/FERROUS FUMARATE/FA (M-VIT PO) Take  by mouth. No current facility-administered medications for this visit. Impression see above. This note was created using voice recognition software. Despite editing, there may be syntax errors.

## 2022-12-18 LAB
ECHO AO ASC DIAM: 4.1 CM
ECHO AO ASCENDING AORTA INDEX: 2.2 CM/M2
ECHO AO ROOT DIAM: 3.8 CM
ECHO AO ROOT INDEX: 2.04 CM/M2
ECHO AV AREA PEAK VELOCITY: 3.3 CM2
ECHO AV AREA VTI: 3.1 CM2
ECHO AV AREA/BSA PEAK VELOCITY: 1.8 CM2/M2
ECHO AV AREA/BSA VTI: 1.7 CM2/M2
ECHO AV MEAN GRADIENT: 6 MMHG
ECHO AV MEAN VELOCITY: 1.2 M/S
ECHO AV PEAK GRADIENT: 11 MMHG
ECHO AV PEAK VELOCITY: 1.7 M/S
ECHO AV VELOCITY RATIO: 0.71
ECHO AV VTI: 33.9 CM
ECHO EST RA PRESSURE: 3 MMHG
ECHO LA DIAMETER INDEX: 2.26 CM/M2
ECHO LA DIAMETER: 4.2 CM
ECHO LA TO AORTIC ROOT RATIO: 1.11
ECHO LA VOL 2C: 60 ML (ref 18–58)
ECHO LA VOL 4C: 79 ML (ref 18–58)
ECHO LA VOLUME AREA LENGTH: 74 ML
ECHO LA VOLUME INDEX A2C: 32 ML/M2 (ref 16–34)
ECHO LA VOLUME INDEX A4C: 42 ML/M2 (ref 16–34)
ECHO LA VOLUME INDEX AREA LENGTH: 40 ML/M2 (ref 16–34)
ECHO LV E' LATERAL VELOCITY: 6 CM/S
ECHO LV E' SEPTAL VELOCITY: 3 CM/S
ECHO LV EDV A2C: 172 ML
ECHO LV EDV A4C: 161 ML
ECHO LV EDV BP: 167 ML (ref 67–155)
ECHO LV EDV INDEX A4C: 87 ML/M2
ECHO LV EDV INDEX BP: 90 ML/M2
ECHO LV EDV NDEX A2C: 92 ML/M2
ECHO LV EJECTION FRACTION A2C: 33 %
ECHO LV EJECTION FRACTION A4C: 31 %
ECHO LV EJECTION FRACTION BIPLANE: 32 % (ref 55–100)
ECHO LV ESV A2C: 115 ML
ECHO LV ESV A4C: 112 ML
ECHO LV ESV BP: 114 ML (ref 22–58)
ECHO LV ESV INDEX A2C: 62 ML/M2
ECHO LV ESV INDEX A4C: 60 ML/M2
ECHO LV ESV INDEX BP: 61 ML/M2
ECHO LV FRACTIONAL SHORTENING: 11 % (ref 28–44)
ECHO LV INTERNAL DIMENSION DIASTOLE INDEX: 3.06 CM/M2
ECHO LV INTERNAL DIMENSION DIASTOLIC: 5.7 CM (ref 4.2–5.9)
ECHO LV INTERNAL DIMENSION SYSTOLIC INDEX: 2.74 CM/M2
ECHO LV INTERNAL DIMENSION SYSTOLIC: 5.1 CM
ECHO LV IVSD: 1 CM (ref 0.6–1)
ECHO LV MASS 2D: 226.4 G (ref 88–224)
ECHO LV MASS INDEX 2D: 121.7 G/M2 (ref 49–115)
ECHO LV POSTERIOR WALL DIASTOLIC: 1 CM (ref 0.6–1)
ECHO LV RELATIVE WALL THICKNESS RATIO: 0.35
ECHO LVOT AREA: 4.5 CM2
ECHO LVOT AV VTI INDEX: 0.66
ECHO LVOT DIAM: 2.4 CM
ECHO LVOT MEAN GRADIENT: 3 MMHG
ECHO LVOT PEAK GRADIENT: 6 MMHG
ECHO LVOT PEAK VELOCITY: 1.2 M/S
ECHO LVOT STROKE VOLUME INDEX: 54.5 ML/M2
ECHO LVOT SV: 101.3 ML
ECHO LVOT VTI: 22.4 CM
ECHO MV A VELOCITY: 0.89 M/S
ECHO MV AREA PHT: 2 CM2
ECHO MV AREA VTI: 5.1 CM2
ECHO MV E DECELERATION TIME (DT): 379.4 MS
ECHO MV E VELOCITY: 0.43 M/S
ECHO MV E/A RATIO: 0.48
ECHO MV E/E' LATERAL: 7.17
ECHO MV E/E' RATIO (AVERAGED): 10.75
ECHO MV E/E' SEPTAL: 14.33
ECHO MV LVOT VTI INDEX: 0.88
ECHO MV MAX VELOCITY: 1 M/S
ECHO MV MEAN GRADIENT: 1 MMHG
ECHO MV MEAN VELOCITY: 0.4 M/S
ECHO MV PEAK GRADIENT: 4 MMHG
ECHO MV PRESSURE HALF TIME (PHT): 110 MS
ECHO MV VTI: 19.8 CM
ECHO RIGHT VENTRICULAR SYSTOLIC PRESSURE (RVSP): 32 MMHG
ECHO RV INTERNAL DIMENSION: 4.2 CM
ECHO RV TAPSE: 2.4 CM (ref 1.7–?)
ECHO TV REGURGITANT MAX VELOCITY: 2.68 M/S
ECHO TV REGURGITANT PEAK GRADIENT: 29 MMHG

## 2022-12-20 ENCOUNTER — OFFICE VISIT (OUTPATIENT)
Dept: ORTHOPEDIC SURGERY | Age: 82
End: 2022-12-20
Payer: MEDICARE

## 2022-12-20 VITALS — BODY MASS INDEX: 27.64 KG/M2 | WEIGHT: 172 LBS | HEIGHT: 66 IN

## 2022-12-20 DIAGNOSIS — G56.02 CARPAL TUNNEL SYNDROME ON LEFT: ICD-10-CM

## 2022-12-20 DIAGNOSIS — G56.01 RIGHT CARPAL TUNNEL SYNDROME: Primary | ICD-10-CM

## 2022-12-20 PROCEDURE — 99203 OFFICE O/P NEW LOW 30 MIN: CPT | Performed by: ORTHOPAEDIC SURGERY

## 2022-12-20 PROCEDURE — 1123F ACP DISCUSS/DSCN MKR DOCD: CPT | Performed by: ORTHOPAEDIC SURGERY

## 2022-12-20 NOTE — PROGRESS NOTES
Loretta Aguilera (: 1940) is a 81 y.o. male patient here for evaluation of the following chief complaint(s):  Hand Pain (Bilateral carpal tunnel )       ASSESSMENT/PLAN:  Below is the assessment and plan developed based on review of pertinent history, physical exam, labs, studies, and medications. 1. Right carpal tunnel syndrome  2. Carpal tunnel syndrome on left      Discussed the bilateral carpal tunnel syndrome. Right is worse than the left with thenar atrophy. Patient has failed conservative management and does want to proceed with surgery. He understands complete recovery may not occur given his severity of his symptoms. We discussed risks, benefits and alternatives to surgery. After thorough discussion ultimately the patient elected to proceed with operative intervention. We discussed the risks including but not limited to postoperative pain, swelling, bruising, bleeding, scarring, infection, damage to neurovascular structures. Risk of permanent or temporary loss of range of motion, need for additional surgery, rejection of foreign material such as metal, suture or other tissue. Plan is for right carpal tunnel release. He may choose to schedule the left side after he is recovered from the right. EMG: Impression:     The electrodiagnostic testing is suggestive of:  1. Bilateral entrapment median mononeuropathy i.e. carpal tunnel syndrome at both wrists. This is severe on the right and moderately severe on the left. Profuse denervation was seen in the right abductor pollicis brevis muscle with only few motor units. Mild to moderate chronic neurogenic changes were seen in the left abductor pollicis brevis muscle  2. 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 cubital tunnel syndrome. This is mainly affecting the motor fibers and the lesion appears predominantly demyelinating. No significant denervation was seen in ulnar innervated muscles  3.   No evidence to suggest a cervical radiculopathy      Patient verbalized understanding and elected to proceed. All questions were answered to the patient's apparent satisfaction. SUBJECTIVE/OBJECTIVE:  HPI    20-year-old male with bilateral hand numbness and tingling. He has had an EMG listed above showing severe carpal tunnel on the right moderate to severe on the left. This is been going on for at least a year. He has constant symptoms and nocturnal symptoms. Does not report any numbness in the small finger. Patient reports a gradual onset of symptoms. Duration of problem 1 year. Symptom Severity 9/10  Symptom Frequency constant        Allergies   Allergen Reactions    Pcn [Penicillins] Rash and Swelling    Robitussin [Guaifenesin] Hives       Current Outpatient Medications   Medication Sig    carvediloL (COREG) 3.125 mg tablet Take 1 Tablet by mouth two (2) times daily (with meals). levothyroxine (synthroid) 50 mcg tablet TAKE 1 TABLET DAILY BEFORE BREAKFAST    trandolapriL (MAVIK) 2 mg tablet TAKE 1 TABLET NIGHTLY FOR LEFT VENTRICULAR DYSFUNCTION FOLLOWING MI    pyridostigmine (MESTINON) 60 mg tablet TAKE 1 TABLET BY MOUTH 3 TIMES A DAY    LORazepam (ATIVAN) 1 mg tablet Take 1 tablet 1 hour prior to MRI. May repeat x1 if needed    FreeStyle Lite Strips strip USE TO TEST ONCE DAILY    FREESTYLE LANCETS 28 gauge misc USE TO TEST DAILY    aspirin delayed-release 81 mg tablet Take  by mouth daily. cholecalciferol (VITAMIN D3) 25 mcg (1,000 unit) cap Take  by mouth daily. fish oil-dha-epa 1,200-144-216 mg cap Take  by mouth. VITAMIN B COMPLEX (B COMPLEX PO) Take  by mouth. PV W-O MIKO/FERROUS FUMARATE/FA (M-VIT PO) Take  by mouth. No current facility-administered medications for this visit.        Social History     Socioeconomic History    Marital status:      Spouse name: Not on file    Number of children: Not on file    Years of education: Not on file    Highest education level: Not on file   Occupational History    Not on file   Tobacco Use    Smoking status: Never    Smokeless tobacco: Never   Vaping Use    Vaping Use: Never used   Substance and Sexual Activity    Alcohol use: No    Drug use: No    Sexual activity: Not on file   Other Topics Concern    Not on file   Social History Narrative    Not on file     Social Determinants of Health     Financial Resource Strain: Low Risk     Difficulty of Paying Living Expenses: Not very hard   Food Insecurity: No Food Insecurity    Worried About Running Out of Food in the Last Year: Never true    Ran Out of Food in the Last Year: Never true   Transportation Needs: Not on file   Physical Activity: Not on file   Stress: Not on file   Social Connections: Not on file   Intimate Partner Violence: Not on file   Housing Stability: Not on file       Past Surgical History:   Procedure Laterality Date    HX HERNIA REPAIR      left inguinal x 2    [place] CARDIAC SURG PROCEDURE UNLIST         Family History   Problem Relation Age of Onset    Cancer Father         GI    Hypertension Mother     Diabetes Mother     Cancer Sister         breast            Review of Systems    No flowsheet data found. Vitals:  Ht 5' 6\" (1.676 m)   Wt 172 lb (78 kg)   BMI 27.76 kg/m²    Estimated body surface area is 1.91 meters squared as calculated from the following:    Height as of this encounter: 5' 6\" (1.676 m). Weight as of this encounter: 172 lb (78 kg). Body mass index is 27.76 kg/m². Physical Exam    Musculoskeletal Exam:    Right NEURO EXAM:    Phalen's: Positive    MNCT: Positive    Thenar Atrophy: severe    Tinel's MN: Positive    APB STRENGTH: 4/5    1st DI Strength: 5/5      Elbow Flexion Test: Negative    UNCT Elbow: Negative    Ring/Small Clawing: Negative    Tinel's UN: Negative      Patient fires AIN, PIN and ulnar nerves. Sensation is grossly intact in the median, radial and ulnar distribution. Hand is pink and appears well-perfused. Hand is warm. Skin is intact. Compartments are soft and compressible. Consitutional: Healthy  Skin:   - Edema - none  - Cellulitis - No    Neuro: Numbness or tingling in R/L arm: Yes    Psych: Affect normal    Cardiovascular: Capillary Refill < 2 seconds in upper extremities    Respiratory: Non-Labored Breathing    ROS:    Constitutional: Denies fever/chills    Respiratory: Denies SOB          An electronic signature was used to authenticate this note.   -- Katharina Page MD

## 2022-12-27 DIAGNOSIS — G56.01 RIGHT CARPAL TUNNEL SYNDROME: Primary | ICD-10-CM

## 2023-01-04 DIAGNOSIS — G56.01 RIGHT CARPAL TUNNEL SYNDROME: Primary | ICD-10-CM

## 2023-01-04 RX ORDER — TRAMADOL HYDROCHLORIDE 50 MG/1
50 TABLET ORAL
Qty: 10 TABLET | Refills: 0 | Status: SHIPPED | OUTPATIENT
Start: 2023-01-04 | End: 2023-01-11

## 2023-01-16 RX ORDER — CARVEDILOL 3.12 MG/1
3.12 TABLET ORAL 2 TIMES DAILY WITH MEALS
Qty: 180 TABLET | Refills: 1 | Status: SHIPPED | OUTPATIENT
Start: 2023-01-16

## 2023-01-16 RX ORDER — LEVOTHYROXINE SODIUM 50 UG/1
TABLET ORAL
Qty: 90 TABLET | Refills: 1 | Status: SHIPPED | OUTPATIENT
Start: 2023-01-16

## 2023-01-16 RX ORDER — TRANDOLAPRIL 2 MG/1
TABLET ORAL
Qty: 90 TABLET | Refills: 1 | Status: SHIPPED | OUTPATIENT
Start: 2023-01-16

## 2023-01-25 ENCOUNTER — OFFICE VISIT (OUTPATIENT)
Dept: ORTHOPEDIC SURGERY | Age: 83
End: 2023-01-25
Payer: MEDICARE

## 2023-01-25 DIAGNOSIS — G56.01 RIGHT CARPAL TUNNEL SYNDROME: Primary | ICD-10-CM

## 2023-01-25 PROCEDURE — 99024 POSTOP FOLLOW-UP VISIT: CPT | Performed by: ORTHOPAEDIC SURGERY

## 2023-01-25 NOTE — LETTER
1/25/2023    Patient: Dottie Kearney   YOB: 1940   Date of Visit: 1/25/2023     Trudi Webb MD  [address] [place], [place] [id number]  Via In Basket    Dear Trudi Webb MD,      Thank you for referring Mr. Alejo Foster to [facility] for evaluation. My notes for this consultation are attached. If you have questions, please do not hesitate to call me. I look forward to following your patient along with you.       Sincerely,    Judson Hughes MD

## 2023-01-25 NOTE — PROGRESS NOTES
Maribel Ariza (: 1940) is a 81 y.o. male patient here for evaluation of the following chief complaint(s):  No chief complaint on file. ASSESSMENT/PLAN:  Below is the assessment and plan developed based on review of pertinent history, physical exam, labs, studies, and medications. 1. Right carpal tunnel syndrome    Patient is doing very well after surgery and is very happy that he had the surgery. We discussed some range of motion exercises and continuing to monitor the wound but overall he is doing great. He does want to consider surgery for the other side but will call us in the future to schedule an appointment as his wife is sick, has cancer and not able to do anything else at this time. He will call for follow-up as needed. Patient verbalized understanding and elected to proceed. All questions were answered to the patient's apparent satisfaction. SUBJECTIVE/OBJECTIVE:    Patient is here today for a postoperative visit. Date of Surgery: 2023    Patient reports she is doing great numbness and tingling is improving and he has had virtually no pain he is very happy. Allergies   Allergen Reactions    Pcn [Penicillins] Rash and Swelling    Robitussin [Guaifenesin] Hives       Current Outpatient Medications   Medication Sig    levothyroxine (synthroid) 50 mcg tablet TAKE 1 TABLET DAILY BEFORE BREAKFAST    trandolapriL (MAVIK) 2 mg tablet TAKE 1 TABLET NIGHTLY FOR LEFT VENTRICULAR DYSFUNCTION FOLLOWING MI    carvediloL (COREG) 3.125 mg tablet Take 1 Tablet by mouth two (2) times daily (with meals). glucose blood VI test strips (FreeStyle Lite Strips) strip USE TO TEST ONCE DAILY    pyridostigmine (MESTINON) 60 mg tablet TAKE 1 TABLET BY MOUTH 3 TIMES A DAY    LORazepam (ATIVAN) 1 mg tablet Take 1 tablet 1 hour prior to MRI. May repeat x1 if needed    FREESTYLE LANCETS 28 gauge misc USE TO TEST DAILY    aspirin delayed-release 81 mg tablet Take  by mouth daily. cholecalciferol (VITAMIN D3) 25 mcg (1,000 unit) cap Take  by mouth daily. fish oil-dha-epa 1,200-144-216 mg cap Take  by mouth. VITAMIN B COMPLEX (B COMPLEX PO) Take  by mouth. PV W-O MIKO/FERROUS FUMARATE/FA (M-VIT PO) Take  by mouth. No current facility-administered medications for this visit. Social History     Socioeconomic History    Marital status:      Spouse name: Not on file    Number of children: Not on file    Years of education: Not on file    Highest education level: Not on file   Occupational History    Not on file   Tobacco Use    Smoking status: Never    Smokeless tobacco: Never   Vaping Use    Vaping Use: Never used   Substance and Sexual Activity    Alcohol use: No    Drug use: No    Sexual activity: Not on file   Other Topics Concern    Not on file   Social History Narrative    Not on file     Social Determinants of Health     Financial Resource Strain: Low Risk     Difficulty of Paying Living Expenses: Not very hard   Food Insecurity: No Food Insecurity    Worried About Running Out of Food in the Last Year: Never true    Ran Out of Food in the Last Year: Never true   Transportation Needs: Not on file   Physical Activity: Not on file   Stress: Not on file   Social Connections: Not on file   Intimate Partner Violence: Not on file   Housing Stability: Not on file       Past Surgical History:   Procedure Laterality Date    HX HERNIA REPAIR      left inguinal x 2    [place] CARDIAC SURG PROCEDURE UNLIST         Family History   Problem Relation Age of Onset    Cancer Father         GI    Hypertension Mother     Diabetes Mother     Cancer Sister         breast            Review of Systems    No flowsheet data found. Vitals: There were no vitals taken for this visit. Estimated body surface area is 1.91 meters squared as calculated from the following:    Height as of 12/20/22: 5' 6\" (1.676 m). Weight as of 12/20/22: 172 lb (78 kg).   There is no height or weight on file to calculate BMI. Physical Exam    Musculoskeletal Exam:    Right Upper Extremity Exam:    Evaluation of the patient's postoperative site shows that the incision is intact and healing appropriately. There are no signs of infection, no redness, no warmth, no erythema. There is no purulent drainage noted. Patient fires AIN, PIN and ulnar nerves. Sensation is grossly intact in the median, radial and ulnar distribution. Hand is pink and appears well-perfused. Hand is warm. Consitutional: Healthy  Skin:   - Edema - mild  - Cellulitis - No    Neuro: Numbness or tingling in R/L arm: mild    Psych: Affect normal    Cardiovascular: Capillary Refill < 2 seconds in upper extremities    Respiratory: Non-Labored Breathing      ROS:    Constitutional: Denies fever/chills    Respiratory: Denies SOB      Procedures:    Sutures removed today. An electronic signature was used to authenticate this note.   -- Rochelle Gaitan MD

## 2023-02-02 ENCOUNTER — OFFICE VISIT (OUTPATIENT)
Dept: FAMILY MEDICINE CLINIC | Age: 83
End: 2023-02-02
Payer: MEDICARE

## 2023-02-02 VITALS
HEIGHT: 66 IN | OXYGEN SATURATION: 95 % | SYSTOLIC BLOOD PRESSURE: 111 MMHG | BODY MASS INDEX: 28.09 KG/M2 | WEIGHT: 174.8 LBS | RESPIRATION RATE: 20 BRPM | HEART RATE: 104 BPM | TEMPERATURE: 99 F | DIASTOLIC BLOOD PRESSURE: 67 MMHG

## 2023-02-02 DIAGNOSIS — J06.9 UPPER RESPIRATORY TRACT INFECTION, UNSPECIFIED TYPE: Primary | ICD-10-CM

## 2023-02-02 DIAGNOSIS — E78.00 PURE HYPERCHOLESTEROLEMIA: ICD-10-CM

## 2023-02-02 DIAGNOSIS — Z00.00 MEDICARE ANNUAL WELLNESS VISIT, SUBSEQUENT: ICD-10-CM

## 2023-02-02 DIAGNOSIS — E03.4 HYPOTHYROIDISM DUE TO ACQUIRED ATROPHY OF THYROID: ICD-10-CM

## 2023-02-02 DIAGNOSIS — E13.8: ICD-10-CM

## 2023-02-02 DIAGNOSIS — C61 MALIGNANT NEOPLASM OF PROSTATE (HCC): ICD-10-CM

## 2023-02-02 DIAGNOSIS — G70.00 MYASTHENIA GRAVIS (HCC): ICD-10-CM

## 2023-02-02 DIAGNOSIS — E11.3592 CONTROLLED TYPE 2 DIABETES MELLITUS WITH PROLIFERATIVE RETINOPATHY OF LEFT EYE, WITHOUT LONG-TERM CURRENT USE OF INSULIN, MACULAR EDEMA PRESENCE UNSPECIFIED, UNSPECIFIED PROLIFERATIVE RETINOPAT* (HCC): ICD-10-CM

## 2023-02-02 DIAGNOSIS — I50.22 CHRONIC SYSTOLIC CONGESTIVE HEART FAILURE (HCC): ICD-10-CM

## 2023-02-02 PROCEDURE — 3044F HG A1C LEVEL LT 7.0%: CPT | Performed by: FAMILY MEDICINE

## 2023-02-02 PROCEDURE — G8536 NO DOC ELDER MAL SCRN: HCPCS | Performed by: FAMILY MEDICINE

## 2023-02-02 PROCEDURE — G0439 PPPS, SUBSEQ VISIT: HCPCS | Performed by: FAMILY MEDICINE

## 2023-02-02 PROCEDURE — 1123F ACP DISCUSS/DSCN MKR DOCD: CPT | Performed by: FAMILY MEDICINE

## 2023-02-02 PROCEDURE — 99214 OFFICE O/P EST MOD 30 MIN: CPT | Performed by: FAMILY MEDICINE

## 2023-02-02 PROCEDURE — G8427 DOCREV CUR MEDS BY ELIG CLIN: HCPCS | Performed by: FAMILY MEDICINE

## 2023-02-02 PROCEDURE — 1101F PT FALLS ASSESS-DOCD LE1/YR: CPT | Performed by: FAMILY MEDICINE

## 2023-02-02 PROCEDURE — G8510 SCR DEP NEG, NO PLAN REQD: HCPCS | Performed by: FAMILY MEDICINE

## 2023-02-02 PROCEDURE — G8417 CALC BMI ABV UP PARAM F/U: HCPCS | Performed by: FAMILY MEDICINE

## 2023-02-02 RX ORDER — BENZONATATE 200 MG/1
200 CAPSULE ORAL
Qty: 30 CAPSULE | Refills: 1 | Status: SHIPPED | OUTPATIENT
Start: 2023-02-02

## 2023-02-02 RX ORDER — DOXYCYCLINE 100 MG/1
100 TABLET ORAL 2 TIMES DAILY
Qty: 14 TABLET | Refills: 0 | Status: SHIPPED | OUTPATIENT
Start: 2023-02-02 | End: 2023-02-09

## 2023-02-02 NOTE — PROGRESS NOTES
1. \"Have you been to the ER, urgent care clinic since your last visit? Hospitalized since your last visit? \" No    2. \"Have you seen or consulted any other health care providers outside of the [address] [place] [address] since your last visit? \" Yes, [facility]    3. For patients aged 39-70: Has the patient had a colonoscopy / FIT/ Cologuard? NA - based on age    If the patient is female:    4. For patients aged 41-77: Has the patient had a mammogram within the past 2 years? NA - based on age or sex    11. For patients aged 21-65: Has the patient had a pap smear? NA - based on age or sex     Patient is accompanied by self I have received verbal consent from Johny Mackenzie to discuss any/all medical information while they are present in the room.   Reviewed record in preparation for visit and have necessary documentation  Goals that were addressed and/or need to be completed during or after this appointment include     Health Maintenance Due   Topic Date Due    Eye Exam Retinal or Dilated  01/21/2021    COVID-19 Vaccine (3 - Booster for Moderna series) 05/04/2021    DTaP/Tdap/Td series (2 - Td or Tdap) 05/17/2021    Foot Exam Q1  03/22/2022    A1C test (Diabetic or Prediabetic)  09/01/2022    Medicare Yearly Exam  03/02/2023

## 2023-02-03 LAB
ALBUMIN SERPL-MCNC: 3.9 G/DL (ref 3.5–5)
ALBUMIN/GLOB SERPL: 1.4 (ref 1.1–2.2)
ALP SERPL-CCNC: 62 U/L (ref 45–117)
ALT SERPL-CCNC: 29 U/L (ref 12–78)
ANION GAP SERPL CALC-SCNC: 8 MMOL/L (ref 5–15)
AST SERPL-CCNC: 15 U/L (ref 15–37)
BASOPHILS # BLD: 0.1 K/UL (ref 0–0.1)
BASOPHILS NFR BLD: 1 % (ref 0–1)
BILIRUB SERPL-MCNC: 1.1 MG/DL (ref 0.2–1)
BUN SERPL-MCNC: 16 MG/DL (ref 6–20)
BUN/CREAT SERPL: 16 (ref 12–20)
CALCIUM SERPL-MCNC: 9.4 MG/DL (ref 8.5–10.1)
CHLORIDE SERPL-SCNC: 103 MMOL/L (ref 97–108)
CHOLEST SERPL-MCNC: 178 MG/DL
CO2 SERPL-SCNC: 27 MMOL/L (ref 21–32)
CREAT SERPL-MCNC: 1 MG/DL (ref 0.7–1.3)
DIFFERENTIAL METHOD BLD: ABNORMAL
EOSINOPHIL # BLD: 0.1 K/UL (ref 0–0.4)
EOSINOPHIL NFR BLD: 1 % (ref 0–7)
ERYTHROCYTE [DISTWIDTH] IN BLOOD BY AUTOMATED COUNT: 12.8 % (ref 11.5–14.5)
EST. AVERAGE GLUCOSE BLD GHB EST-MCNC: 111 MG/DL
GLOBULIN SER CALC-MCNC: 2.7 G/DL (ref 2–4)
GLUCOSE SERPL-MCNC: 108 MG/DL (ref 65–100)
HBA1C MFR BLD: 5.5 % (ref 4–5.6)
HCT VFR BLD AUTO: 39.9 % (ref 36.6–50.3)
HDLC SERPL-MCNC: 46 MG/DL
HDLC SERPL: 3.9 (ref 0–5)
HGB BLD-MCNC: 13.1 G/DL (ref 12.1–17)
IMM GRANULOCYTES # BLD AUTO: 0.1 K/UL (ref 0–0.04)
IMM GRANULOCYTES NFR BLD AUTO: 0 % (ref 0–0.5)
LDLC SERPL CALC-MCNC: 107.8 MG/DL (ref 0–100)
LYMPHOCYTES # BLD: 1 K/UL (ref 0.8–3.5)
LYMPHOCYTES NFR BLD: 8 % (ref 12–49)
MCH RBC QN AUTO: 33 PG (ref 26–34)
MCHC RBC AUTO-ENTMCNC: 32.8 G/DL (ref 30–36.5)
MCV RBC AUTO: 100.5 FL (ref 80–99)
MONOCYTES # BLD: 1 K/UL (ref 0–1)
MONOCYTES NFR BLD: 8 % (ref 5–13)
NEUTS SEG # BLD: 10.1 K/UL (ref 1.8–8)
NEUTS SEG NFR BLD: 82 % (ref 32–75)
NRBC # BLD: 0 K/UL (ref 0–0.01)
NRBC BLD-RTO: 0 PER 100 WBC
PLATELET # BLD AUTO: 208 K/UL (ref 150–400)
PMV BLD AUTO: 10.1 FL (ref 8.9–12.9)
POTASSIUM SERPL-SCNC: 4.2 MMOL/L (ref 3.5–5.1)
PROT SERPL-MCNC: 6.6 G/DL (ref 6.4–8.2)
RBC # BLD AUTO: 3.97 M/UL (ref 4.1–5.7)
SODIUM SERPL-SCNC: 138 MMOL/L (ref 136–145)
TRIGL SERPL-MCNC: 121 MG/DL (ref ?–150)
TSH SERPL DL<=0.05 MIU/L-ACNC: 1.59 UIU/ML (ref 0.36–3.74)
VLDLC SERPL CALC-MCNC: 24.2 MG/DL
WBC # BLD AUTO: 12.3 K/UL (ref 4.1–11.1)

## 2023-02-09 NOTE — PATIENT INSTRUCTIONS
Medicare Wellness Visit, Male    The best way to live healthy is to have a lifestyle where you eat a well-balanced diet, exercise regularly, limit alcohol use, and quit all forms of tobacco/nicotine, if applicable. Regular preventive services are another way to keep healthy. Preventive services (vaccines, screening tests, monitoring & exams) can help personalize your care plan, which helps you manage your own care. Screening tests can find health problems at the earliest stages, when they are easiest to treat. [facility] follows the current, evidence-based guidelines published by the [facility] ([facility]) when recommending preventive services for our patients. Because we follow these guidelines, sometimes recommendations change over time as research supports it. (For example, a prostate screening blood test is no longer routinely recommended for men with no symptoms). Of course, you and your doctor may decide to screen more often for some diseases, based on your risk and co-morbidities (chronic disease you are already diagnosed with). Preventive services for you include:  - Medicare offers their members a free annual wellness visit, which is time for you and your primary care provider to discuss and plan for your preventive service needs.  Take advantage of this benefit every year!    -Over the age of 72 should receive the recommended pneumonia vaccines.   -All adults should have a flu vaccine yearly.  -All adults should receive a tetanus vaccine every 10 years.  -Over the age of 48 should receive the shingles vaccines.    -All adults should be screened once for Hepatitis C.  -All adults age 38-68 who are overweight should have a diabetes screening test once every three years.   -Other screening tests & preventive services for persons with diabetes include: an eye exam to screen for diabetic retinopathy, a kidney function test, a foot exam, and stricter control over your cholesterol.   -Cardiovascular screening for adults with routine risk involves an electrocardiogram (ECG) at intervals determined by the provider.     -Colorectal cancer screening should be done for adults age 43-69 with no increased risk factors for colorectal cancer. There are a number of acceptable methods of screening for this type of cancer. Each test has its own benefits and drawbacks. Discuss with your provider what is most appropriate for you during your annual wellness visit. The different tests include: colonoscopy (considered the best screening method), a fecal occult blood test, a fecal DNA test, and sigmoidoscopy.    -Lung cancer screening is recommended annually with a low dose CT scan for adults between age 54 and 68, who have smoked at least 30 pack years (equivalent of 1 pack per day for 30 days), and who is a current smoker or quit less than 15 years ago. -An Abdominal Aortic Aneurysm (AAA) Screening is recommended for men age 73-68 who has ever smoked in their lifetime.      Here is a list of your current Health Maintenance items (your personalized list of preventive services) with a due date:  Health Maintenance Due   Topic Date Due    COVID-19 Vaccine (3 - Booster for Moderna series) 05/04/2021    DTaP/Tdap/Td  (2 - Td or Tdap) 05/17/2021    Diabetic Foot Care  03/22/2022

## 2023-02-09 NOTE — PROGRESS NOTES
Progress Note    Patient: Nicoletta Primrose MRN: [id number]  SSN: [id number]    YOB: 1940  Age: 81 y.o. Sex: male        Chief Complaint   Patient presents with    Cold Symptoms     Cough and congestion-green phlegm x 1 day     he is a 81y.o. year old male who presents for follow up of chronic health conditions. He is concerned about cough and congestion. Patient with hx of T2D, hypothyroidism, HLD and CHF. He is due for lab work. Patient denies HA, dizziness, SOB, CP, abdominal pain, dysuria, acute myalgias or arthralgias. Encounter Diagnoses   Name Primary? 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 Overall the patient feels well with good energy level. Insulin dependence: no   Pertinent Labs:   Lab Results   Component Value Date/Time    Hemoglobin A1c 5.5 02/02/2023 10:45 AM      Body mass index is 28.21 kg/m². Lab Results   Component Value Date/Time    LDL, calculated 107.8 (H) 02/02/2023 10:45 AM        Wt Readings from Last 3 Encounters:   02/02/23 174 lb 12.8 oz (79.3 kg)   12/20/22 172 lb (78 kg)   12/16/22 173 lb (78.5 kg)        Social History     Tobacco Use   Smoking Status Never   Smokeless Tobacco Never        Medications, diet and exercise as means of diabetic control with a goal of an A1C of less than 7.0% discussed.  Diabetic foot care and annual eye exam discussed as well.  Call immediately if having symptoms of high sugar (frequent urination, always thirsty) or low sugar (dizzy, lethargic, sweaty, nauseated, headache). Our overall goal is to reduce or eliminate the long term consequences of poorly controlled diabetes. Patient expresses understanding and agreement with our plan of care.       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 edema associated with diabetes mellitus due to underlying condition ([facility] K30.5191    Prostate cancer ([facility] C61    Controlled maturity onset diabetes mellitus in young (ROMINA) type 2 with complication ([facility]) G55.2    Dyslipidemia E78.5    Chronic systolic congestive heart failure ([facility]) I50.22    Statin intolerance Z78.9    Myasthenia gravis ([facility]) G70.00    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, without long-term current use of insulin, macular edema presence unspecified, unspecified proliferative retinopat* ([facility] D88.4282     Past Surgical History:   Procedure Laterality Date    HX HERNIA REPAIR      left inguinal x 2    CA UNLISTED PROCEDURE CARDIAC SURGERY       Social History     Socioeconomic History    Marital status:      Spouse name: Not on file    Number of children: Not on file    Years of education: Not on file    Highest education level: Not on file   Occupational History    Not on file   Tobacco Use    Smoking status: Never    Smokeless tobacco: Never   Vaping Use    Vaping Use: Never used   Substance and Sexual Activity    Alcohol use: No    Drug use: No    Sexual activity: Not on file   Other Topics Concern    Not on file   Social History Narrative    Not on file     Social Determinants of Health     Financial Resource Strain: Low Risk     Difficulty of Paying Living Expenses: Not very hard   Food Insecurity: No Food Insecurity    Worried About Running Out of Food in the Last Year: Never true    Ran Out of Food in the Last Year: Never true   Transportation Needs: Not on file   Physical Activity: Not on file   Stress: Not on file   Social Connections: Not on file   Intimate Partner Violence: Not on file   Housing Stability: Not on file     Family History   Problem Relation Age of Onset    Cancer Father         GI    Hypertension Mother     Diabetes Mother     Cancer Sister         breast     Current Outpatient Medications   Medication Sig    doxycycline (ADOXA) 100 mg tablet Take 1 Tablet by mouth two (2) times a day for 7 days. benzonatate (TESSALON) 200 mg capsule Take 1 Capsule by mouth three (3) times daily as needed for Cough. levothyroxine (synthroid) 50 mcg tablet TAKE 1 TABLET DAILY BEFORE BREAKFAST    trandolapriL (MAVIK) 2 mg tablet TAKE 1 TABLET NIGHTLY FOR LEFT VENTRICULAR DYSFUNCTION FOLLOWING MI    carvediloL (COREG) 3.125 mg tablet Take 1 Tablet by mouth two (2) times daily (with meals). glucose blood VI test strips (FreeStyle Lite Strips) strip USE TO TEST ONCE DAILY    pyridostigmine (MESTINON) 60 mg tablet TAKE 1 TABLET BY MOUTH 3 TIMES A DAY    LORazepam (ATIVAN) 1 mg tablet Take 1 tablet 1 hour prior to MRI. May repeat x1 if needed    FREESTYLE LANCETS 28 gauge misc USE TO TEST DAILY    aspirin delayed-release 81 mg tablet Take  by mouth daily. cholecalciferol (VITAMIN D3) 25 mcg (1,000 unit) cap Take  by mouth daily. fish oil-dha-epa 1,200-144-216 mg cap Take  by mouth. VITAMIN B COMPLEX (B COMPLEX PO) Take  by mouth. PV W-O MIKO/FERROUS FUMARATE/FA (M-VIT PO) Take  by mouth. No current facility-administered medications for this visit.      Allergies   Allergen Reactions    Pcn [Penicillins] Rash and Swelling    Robitussin [Guaifenesin] Hives       Review of Systems:  Constitutional: Negative for fatigue, malaise  Resp: Negative for wheezing or SOB  CV: Negative for chest pain, dizziness or palpitations  GI: Negative for nausea or abdominal pain  MS: Negative for acute myalgias or arthralgias   Neuro: Negative for HA, weakness or paresthesia  Psych: Negative for depression or anxiety     Vitals:    02/02/23 0949 02/02/23 1016   BP: 90/61 111/67   Pulse: (!) 104    Resp: 20    Temp: 99 °F (37.2 °C)    TempSrc: Oral    SpO2: 95%    Weight: 174 lb 12.8 oz (79.3 kg)    Height: 5' 6\" (1.676 m)        Physical Examination:  General: Well developed, well nourished, in no acute distress  Head: Normocephalic, atraumatic  Eyes: Sclera clear, EOMI  Neck: Normal range of motion  Respiratory: CTAB with symmetrical, unlabored effort  Cardiovascular: Regular rate and rhythm  Extremities: Full range of motion, normal gait  Neurologic: No focal deficits  Psych: Active, alert and oriented. Affect appropriate       ICD-10-CM ICD-9-CM    1. Upper respiratory tract infection, unspecified type  J06.9 465.9 doxycycline (ADOXA) 100 mg tablet      benzonatate (TESSALON) 200 mg capsule      2. 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 Pure hypercholesterolemia  E78.00 272.0 LIPID PANEL      METABOLIC PANEL, COMPREHENSIVE      METABOLIC PANEL, COMPREHENSIVE      LIPID PANEL      4. Hypothyroidism due to acquired atrophy of thyroid  E03.4 244.8 TSH 3RD GENERATION     246.8 TSH 3RD GENERATION      5. Chronic systolic congestive heart failure (HCC)  I50.22 428.22 CBC WITH AUTOMATED DIFF     428.0 CBC WITH AUTOMATED DIFF      6.  Myasthenia gravis ([facility]  G70.00 358.00       7. Malignant neoplasm of prostate ([facility]  C61 185       8. Controlled maturity onset diabetes mellitus in young (ROMINA) type 2 with complication ([facility]  M65.9 250.90           Plan of care:  Diagnoses were discussed in detail with patient. Overall the patient's multiple, chronic medical problems as reviewed above appear stable. The patient is compliant to all medical recommendations. Medications reviewed and appropriate. Patient to continue current prescribed medications as written. Medication risks/benefits/side effects discussed with patient. All of the patient's questions were addressed and answered to apparent satisfaction. The patient understands and agrees with our plan of care. The patient knows to call back if they have questions about the plan of care or if symptoms change. The patient received an After-Visit Summary which contains VS, diagnoses, orders, allergy and medication lists. Future Appointments   Date Time Provider Sarina Richards   4/26/2023  8:40 AM Edilma Morales MD [facility] BS AMB   6/14/2023 10:00 AM Jaylin Day MD [facility] BS AMB       The following Annual Medicare Wellness Exam is distinct and separate from the medical evaluation and decision making. This is the Subsequent Medicare Annual Wellness Exam, performed 12 months or more after the Initial AWV or the last Subsequent AWV    I have reviewed the patient's medical history in detail and updated the computerized patient record. Assessment/Plan   Education and counseling provided:  Are appropriate based on today's review and evaluation  End-of-Life planning (with patient's consent)    1.  Subsequent Medicare Annual Wellness Exam       Depression Risk Factor Screening     3 most recent PHQ Screens 2/2/2023   Little interest or pleasure in doing things Not at all   Feeling down, depressed, irritable, or hopeless Not at all   Total Score PHQ 2 0       Alcohol & Drug Abuse Risk Screen    Do you average more than 1 drink per night or more than 7 drinks a week: No    In the past three months have you have had more than 4 drinks containing alcohol on one occasion: No          Functional Ability and Level of Safety    Hearing: Hearing is adeuate. Activities of Daily Living: The home contains: no safety equipment. Patient does total self care      Ambulation: with no difficulty     Fall Risk:  Fall Risk Assessment, last 12 mths 2/2/2023   Able to walk? Yes   Fall in past 12 months? 0   Do you feel unsteady? 0   Are you worried about falling 0      Abuse Screen:  Patient is not abused       Cognitive Screening    Has your family/caregiver stated any concerns about your memory: no     Screening deferred.     Health Maintenance Due     Health Maintenance Due   Topic Date Due    Eye Exam Retinal or Dilated  01/21/2021    COVID-19 Vaccine (3 - Booster for Moderna series) 05/04/2021    DTaP/Tdap/Td series (2 - Td or Tdap) 05/17/2021    Foot Exam Q1  03/22/2022    Medicare Yearly Exam  03/02/2023       Patient Care Team   Patient Care Team:  Neo Grover MD as PCP - General (Family Medicine)  Neo Grover MD as PCP - [facility] Empaneled Provider  Genna Coughlin MD (Urology)  Zaria Muñoz MD (Ophthalmology)  Regan Michelle MD as Consulting Provider (Cardiovascular Disease Physician)    History     Patient Active Problem List   Diagnosis Code    Colon polyps K63.5    Hypothyroidism E03.9    Asymptomatic PVCs I49.3    RMSF [facility] spotted fever) A77.0    Ischemic cardiomyopathy I25.5    Mild 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 edema associated with diabetes mellitus due to underlying condition ([address] K97.6366    Prostate cancer ([address] C61    Controlled maturity onset diabetes mellitus in young (ROMINA) type 2 with complication ([address] M29.2    Dyslipidemia E78.5    Chronic systolic congestive heart failure (HCC) I50.22    Statin intolerance Z78.9    Myasthenia gravis ([place] [address].) G70.00    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, without long-term current use of insulin, macular edema presence unspecified, unspecified proliferative retinopat* ([place] [address] A72.5366     Past Medical History:   Diagnosis Date    Cardiomyopathy, dilated, nonischemic (HCC)     Colon polyps     Hypercholesterolemia     Prostate cancer ([facility]     [place] 6, watch and wait strategy    [facility])     T2DM (type 2 diabetes mellitus) ([place] [id number] 8/21/2013      Past Surgical History:   Procedure Laterality Date    HX HERNIA REPAIR      left inguinal x 2    ND UNLISTED PROCEDURE CARDIAC SURGERY       Current Outpatient Medications   Medication Sig Dispense Refill    doxycycline (ADOXA) 100 mg tablet Take 1 Tablet by mouth two (2) times a day for 7 days. 14 Tablet 0    benzonatate (TESSALON) 200 mg capsule Take 1 Capsule by mouth three (3) times daily as needed for Cough. 30 Capsule 1    levothyroxine (synthroid) 50 mcg tablet TAKE 1 TABLET DAILY BEFORE BREAKFAST 90 Tablet 1    trandolapriL (MAVIK) 2 mg tablet TAKE 1 TABLET NIGHTLY FOR LEFT VENTRICULAR DYSFUNCTION FOLLOWING MI 90 Tablet 1    carvediloL (COREG) 3.125 mg tablet Take 1 Tablet by mouth two (2) times daily (with meals). 180 Tablet 1    glucose blood VI test strips (FreeStyle Lite Strips) strip USE TO TEST ONCE DAILY 100 Strip 1    pyridostigmine (MESTINON) 60 mg tablet TAKE 1 TABLET BY MOUTH 3 TIMES A DAY 90 Tablet 0    LORazepam (ATIVAN) 1 mg tablet Take 1 tablet 1 hour prior to MRI. May repeat x1 if needed 2 Tablet 0    FREESTYLE LANCETS 28 gauge misc USE TO TEST DAILY 100 Lancet 4    aspirin delayed-release 81 mg tablet Take  by mouth daily. cholecalciferol (VITAMIN D3) 25 mcg (1,000 unit) cap Take  by mouth daily. fish oil-dha-epa 1,200-144-216 mg cap Take  by mouth. VITAMIN B COMPLEX (B COMPLEX PO) Take  by mouth. PV W-O MIKO/FERROUS FUMARATE/FA (M-VIT PO) Take  by mouth. Allergies   Allergen Reactions    Pcn [Penicillins] Rash and Swelling    Robitussin [Guaifenesin] Hives       Family History   Problem Relation Age of Onset    Cancer Father         GI    Hypertension Mother     Diabetes Mother     Cancer Sister         breast     Social History     Tobacco Use    Smoking status: Never    Smokeless tobacco: Never   Substance Use Topics    Alcohol use: No         Katlyn Conley MD

## 2023-03-24 ENCOUNTER — HOSPITAL ENCOUNTER (OUTPATIENT)
Dept: RADIATION THERAPY | Age: 83
Discharge: HOME | End: 2023-03-24

## 2023-04-26 ENCOUNTER — OFFICE VISIT (OUTPATIENT)
Dept: FAMILY MEDICINE CLINIC | Age: 83
End: 2023-04-26

## 2023-04-26 VITALS
OXYGEN SATURATION: 97 % | RESPIRATION RATE: 16 BRPM | TEMPERATURE: 97.5 F | SYSTOLIC BLOOD PRESSURE: 104 MMHG | DIASTOLIC BLOOD PRESSURE: 66 MMHG | WEIGHT: 180.4 LBS | BODY MASS INDEX: 28.99 KG/M2 | HEIGHT: 66 IN | HEART RATE: 54 BPM

## 2023-04-26 DIAGNOSIS — E03.4 HYPOTHYROIDISM DUE TO ACQUIRED ATROPHY OF THYROID: ICD-10-CM

## 2023-04-26 DIAGNOSIS — E78.00 PURE HYPERCHOLESTEROLEMIA: Primary | ICD-10-CM

## 2023-04-26 DIAGNOSIS — E11.9 DIABETES MELLITUS TYPE 2, DIET-CONTROLLED (HCC): ICD-10-CM

## 2023-04-26 DIAGNOSIS — G70.00 MYASTHENIA GRAVIS (HCC): ICD-10-CM

## 2023-04-26 NOTE — PROGRESS NOTES
1. \"Have you been to the ER, urgent care clinic since your last visit? Hospitalized since your last visit? \" No    2. \"Have you seen or consulted any other health care providers outside of the [address] [place] [address] since your last visit? \" Yes, Urology    3. For patients aged 39-70: Has the patient had a colonoscopy / FIT/ Cologuard? NA - based on age    If the patient is female:    4. For patients aged 41-77: Has the patient had a mammogram within the past 2 years? NA - based on age or sex    11. For patients aged 21-65: Has the patient had a pap smear? NA - based on age or sex     Patient is accompanied by self I have received verbal consent from Demetri Eason to discuss any/all medical information while they are present in the room.   Reviewed record in preparation for visit and have necessary documentation  Goals that were addressed and/or need to be completed during or after this appointment include     Health Maintenance Due   Topic Date Due    DTaP/Tdap/Td series (2 - Td or Tdap) 05/17/2021    Foot Exam Q1  03/22/2022

## 2023-06-01 ENCOUNTER — OFFICE VISIT (OUTPATIENT)
Facility: CLINIC | Age: 83
End: 2023-06-01
Payer: MEDICARE

## 2023-06-01 VITALS
TEMPERATURE: 97.9 F | SYSTOLIC BLOOD PRESSURE: 109 MMHG | HEART RATE: 73 BPM | DIASTOLIC BLOOD PRESSURE: 68 MMHG | OXYGEN SATURATION: 96 % | RESPIRATION RATE: 20 BRPM | HEIGHT: 66 IN | BODY MASS INDEX: 29.41 KG/M2 | WEIGHT: 183 LBS

## 2023-06-01 DIAGNOSIS — E78.00 PURE HYPERCHOLESTEROLEMIA, UNSPECIFIED: ICD-10-CM

## 2023-06-01 DIAGNOSIS — I50.22 CHRONIC SYSTOLIC (CONGESTIVE) HEART FAILURE (HCC): ICD-10-CM

## 2023-06-01 DIAGNOSIS — E03.4 ATROPHY OF THYROID (ACQUIRED): ICD-10-CM

## 2023-06-01 DIAGNOSIS — L30.9 DERMATITIS OF LOWER EXTREMITY: Primary | ICD-10-CM

## 2023-06-01 DIAGNOSIS — E11.9 TYPE 2 DIABETES MELLITUS WITHOUT COMPLICATION, WITHOUT LONG-TERM CURRENT USE OF INSULIN (HCC): ICD-10-CM

## 2023-06-01 PROCEDURE — 1036F TOBACCO NON-USER: CPT | Performed by: FAMILY MEDICINE

## 2023-06-01 PROCEDURE — 1123F ACP DISCUSS/DSCN MKR DOCD: CPT | Performed by: FAMILY MEDICINE

## 2023-06-01 PROCEDURE — 99214 OFFICE O/P EST MOD 30 MIN: CPT | Performed by: FAMILY MEDICINE

## 2023-06-01 PROCEDURE — 3044F HG A1C LEVEL LT 7.0%: CPT | Performed by: FAMILY MEDICINE

## 2023-06-01 PROCEDURE — G8419 CALC BMI OUT NRM PARAM NOF/U: HCPCS | Performed by: FAMILY MEDICINE

## 2023-06-01 PROCEDURE — G8427 DOCREV CUR MEDS BY ELIG CLIN: HCPCS | Performed by: FAMILY MEDICINE

## 2023-06-01 RX ORDER — METHYLPREDNISOLONE 4 MG/1
TABLET ORAL
Qty: 1 KIT | Refills: 0 | Status: SHIPPED | OUTPATIENT
Start: 2023-06-01 | End: 2023-06-07

## 2023-06-01 SDOH — ECONOMIC STABILITY: INCOME INSECURITY: HOW HARD IS IT FOR YOU TO PAY FOR THE VERY BASICS LIKE FOOD, HOUSING, MEDICAL CARE, AND HEATING?: NOT HARD AT ALL

## 2023-06-01 SDOH — ECONOMIC STABILITY: FOOD INSECURITY: WITHIN THE PAST 12 MONTHS, YOU WORRIED THAT YOUR FOOD WOULD RUN OUT BEFORE YOU GOT MONEY TO BUY MORE.: NEVER TRUE

## 2023-06-01 SDOH — ECONOMIC STABILITY: HOUSING INSECURITY
IN THE LAST 12 MONTHS, WAS THERE A TIME WHEN YOU DID NOT HAVE A STEADY PLACE TO SLEEP OR SLEPT IN A SHELTER (INCLUDING NOW)?: NO

## 2023-06-01 SDOH — ECONOMIC STABILITY: FOOD INSECURITY: WITHIN THE PAST 12 MONTHS, THE FOOD YOU BOUGHT JUST DIDN'T LAST AND YOU DIDN'T HAVE MONEY TO GET MORE.: NEVER TRUE

## 2023-06-08 NOTE — PROGRESS NOTES
motion, normal gait  Neurologic: No focal deficits  Psych: Active, alert and oriented. Affect appropriate      Diagnosis Orders   1. Dermatitis of lower extremity  methylPREDNISolone (MEDROL DOSEPACK) 4 MG tablet      2. Type 2 diabetes mellitus without complication, without long-term current use of insulin (HCC)  Hemoglobin A1C    TSH    Lipid Panel    Comprehensive Metabolic Panel    Hemoglobin and Hematocrit      3. Pure hypercholesterolemia, unspecified  Lipid Panel    Comprehensive Metabolic Panel      4. Chronic systolic (congestive) heart failure (HCC)  Hemoglobin and Hematocrit      5. Atrophy of thyroid (acquired)  TSH          Plan of care:  Diagnoses were discussed in detail with patient. Medications reviewed and appropriate. Patient to continue current prescribed medications as written. Medication risks/benefits/side effects discussed with patient. All of the patient's questions were addressed and answered to apparent satisfaction. The patient understands and agrees with our plan of care. The patient knows to call back if they have questions about the plan of care or if symptoms change. The patient received an After-Visit Summary which contains VS, diagnoses, orders, allergy and medication lists.       Future Appointments   Date Time Provider Altagracia Reyna   8/18/2023 10:40 AM Veronica Duarte MD [facility] BS AMB   9/26/2023  2:00 PM Tisha Feldman MD [facility]   10/26/2023  8:40 AM [facility]

## 2023-07-06 ENCOUNTER — TELEPHONE (OUTPATIENT)
Age: 83
End: 2023-07-06

## 2023-07-06 NOTE — TELEPHONE ENCOUNTER
Called patient's daughter. Informed her that we have not received any papers from Dr. Lilia Rhodes office. She stated that she will call the office to get it faxed.

## 2023-07-06 NOTE — TELEPHONE ENCOUNTER
Daughter calling to see if Dr. Juice Solomon has received a message from Dr. Beverly Shelton to order a CT Scan for his Thyroid. Please call to discuss .

## 2023-07-25 RX ORDER — LEVOTHYROXINE SODIUM 50 MCG
TABLET ORAL
Qty: 90 TABLET | Refills: 1 | Status: SHIPPED | OUTPATIENT
Start: 2023-07-25

## 2023-07-25 RX ORDER — TRANDOLAPRIL TABLETS 2 MG/1
TABLET ORAL
Qty: 90 TABLET | Refills: 1 | Status: SHIPPED | OUTPATIENT
Start: 2023-07-25

## 2023-07-25 RX ORDER — CARVEDILOL 3.12 MG/1
TABLET ORAL
Qty: 180 TABLET | Refills: 1 | Status: SHIPPED | OUTPATIENT
Start: 2023-07-25

## 2023-08-18 ENCOUNTER — OFFICE VISIT (OUTPATIENT)
Age: 83
End: 2023-08-18
Payer: MEDICARE

## 2023-08-18 VITALS
DIASTOLIC BLOOD PRESSURE: 64 MMHG | OXYGEN SATURATION: 97 % | HEART RATE: 62 BPM | WEIGHT: 185 LBS | BODY MASS INDEX: 29.73 KG/M2 | HEIGHT: 66 IN | SYSTOLIC BLOOD PRESSURE: 110 MMHG

## 2023-08-18 DIAGNOSIS — I50.22 CHRONIC SYSTOLIC CONGESTIVE HEART FAILURE (HCC): Primary | ICD-10-CM

## 2023-08-18 DIAGNOSIS — I25.10 CORONARY ARTERY DISEASE INVOLVING NATIVE CORONARY ARTERY OF NATIVE HEART WITHOUT ANGINA PECTORIS: ICD-10-CM

## 2023-08-18 DIAGNOSIS — I49.3 ASYMPTOMATIC PVCS: ICD-10-CM

## 2023-08-18 DIAGNOSIS — E78.5 DYSLIPIDEMIA: ICD-10-CM

## 2023-08-18 DIAGNOSIS — I35.1 AORTIC EJECTION MURMUR: ICD-10-CM

## 2023-08-18 DIAGNOSIS — I25.5 ISCHEMIC CARDIOMYOPATHY: ICD-10-CM

## 2023-08-18 DIAGNOSIS — Z78.9 STATIN INTOLERANCE: ICD-10-CM

## 2023-08-18 PROCEDURE — 99214 OFFICE O/P EST MOD 30 MIN: CPT | Performed by: SPECIALIST

## 2023-08-18 PROCEDURE — 1123F ACP DISCUSS/DSCN MKR DOCD: CPT | Performed by: SPECIALIST

## 2023-08-18 PROCEDURE — G8419 CALC BMI OUT NRM PARAM NOF/U: HCPCS | Performed by: SPECIALIST

## 2023-08-18 PROCEDURE — 1036F TOBACCO NON-USER: CPT | Performed by: SPECIALIST

## 2023-08-18 PROCEDURE — G8427 DOCREV CUR MEDS BY ELIG CLIN: HCPCS | Performed by: SPECIALIST

## 2023-08-18 ASSESSMENT — PATIENT HEALTH QUESTIONNAIRE - PHQ9
1. LITTLE INTEREST OR PLEASURE IN DOING THINGS: 0
SUM OF ALL RESPONSES TO PHQ QUESTIONS 1-9: 0
SUM OF ALL RESPONSES TO PHQ QUESTIONS 1-9: 0
SUM OF ALL RESPONSES TO PHQ9 QUESTIONS 1 & 2: 0
SUM OF ALL RESPONSES TO PHQ QUESTIONS 1-9: 0
2. FEELING DOWN, DEPRESSED OR HOPELESS: 0
SUM OF ALL RESPONSES TO PHQ QUESTIONS 1-9: 0

## 2023-08-18 NOTE — PROGRESS NOTES
Tomer Escalante is a 81 y.o. male    had concerns including Follow-up and Coronary Artery Disease. Vitals:    08/18/23 1055   BP: 110/64   Site: Left Upper Arm   Position: Sitting   Pulse: 62   SpO2: 97%   Weight: 185 lb (83.9 kg)   Height: 5' 6\" (1.676 m)        Chest pain No    SOB No    Dizziness No    Swelling No    Refills No    Covid Vaccinated yes      1. Have you been to the ER, urgent care clinic since your last visit? Hospitalized since your last visit? no    2. Have you seen or consulted any other health care providers outside of the [address] [place] [address] since your last visit? Include any pap smears or colon screening.  no
present. Throat: moist mucous membranes. Chest: Effort normal & normal respiratory excursion . Neurological: alert, conversant and oriented . Skin: Skin is not cold. No obvious systemic rash noted. Not diaphoretic. No erythema. Psychiatric:  Grossly normal mood and affect. Behavior appears normal.   Extremities:  no clubbing or cyanosis. Abdomen: non distended    Lungs:breath sounds normal. No stridor. distress, wheezes or  Rales. Heart:    normal rate, regular rhythm, normal S1, S2, no  rubs, clicks or gallops , PMI non displaced. A 1/6 soft systolic murmur is heard throughout the central precordium. Edema: Edema is none. Lab Results   Component Value Date/Time    CHOL 178 02/02/2023 10:45 AM    HDL 46 02/02/2023 10:45 AM     Lab Results   Component Value Date/Time     02/02/2023 10:45 AM    K 4.2 02/02/2023 10:45 AM     02/02/2023 10:45 AM    CO2 27 02/02/2023 10:45 AM    BUN 16 02/02/2023 10:45 AM    CREATININE 1.00 02/02/2023 10:45 AM    GLUCOSE 108 02/02/2023 10:45 AM    CALCIUM 9.4 02/02/2023 10:45 AM       Wt Readings from Last 3 Encounters:   08/18/23 185 lb (83.9 kg)   06/01/23 183 lb (83 kg)   04/26/23 180 lb 6.4 oz (81.8 kg)      BP Readings from Last 3 Encounters:   08/18/23 110/64   06/01/23 109/68   04/26/23 104/66        Current Outpatient Medications:     carvedilol (COREG) 3.125 MG tablet, TAKE 1 TABLET TWICE A DAY WITH MEALS, Disp: 180 tablet, Rfl: 1    trandolapril (MAVIK) 2 MG tablet, TAKE 1 TABLET NIGHTLY FOR LEFT VENTRICULAR DYSFUNCTION FOLLOWING MI, Disp: 90 tablet, Rfl: 1    levothyroxine (SYNTHROID) 50 MCG tablet, TAKE 1 TABLET DAILY BEFORE BREAKFAST, Disp: 90 tablet, Rfl: 1    aspirin 81 MG EC tablet, Take by mouth daily, Disp: , Rfl:     vitamin D 25 MCG (1000 UT) CAPS, Take by mouth daily, Disp: , Rfl:     LORazepam (ATIVAN) 1 MG tablet, Take 1 tablet 1 hour prior to MRI.   May repeat x1 if needed, Disp: , Rfl:     pyridostigmine (MESTINON) 60 MG tablet, TAKE 1

## 2023-09-12 RX ORDER — PYRIDOSTIGMINE BROMIDE 60 MG/1
TABLET ORAL
Qty: 90 TABLET | Refills: 3 | Status: SHIPPED | OUTPATIENT
Start: 2023-09-12

## 2023-09-25 NOTE — PROGRESS NOTES
[facility] [place] [facility]  [facility]      RADIATION ONCOLOGY FOLLOW-UP VISIT NOTE     Encounter Date: 09/26/23   Patient Name: Tomer Escalante  YOB: 1940  Medical Record Number: [id number]  Referring Physician: Martha Drew MD  [address] [place],  [address]  Primary Care Provider: Luis Alfredo John MD    DIAGNOSIS:      ICD-10-CM    1. Prostate cancer ([address])  C61         STAGING:  Cancer Staging   Prostate cancer [facility])  Staging form: Prostate, AJCC 8th Edition  - Clinical stage from 7/28/2022: Stage IIC (cT1c, cN0, cM0, PSA: 11.6, Grade Group: 3) - Signed by Gennaro Austin MD on 9/25/2023  - Clinical stage from 9/23/2014: Stage I (cT1c, cN0, cM0, PSA: 3.1, Grade Group: 1) - Signed by Gennaro Austin MD on 9/25/2023  AJCC Staging has been reviewed      DURATION SINCE COMPLETION OF RADIOTHERAPY:  9 months (completed 12/6/22)      ASSESSMENT:  80 yo M with unfavorable intermediate risk prostate cancer cT1c, iPSA 11.58, Zapata 4+3=7 in 1 core (2/13 total cores involved). Previously on active surveillance for low risk disease, diagnosed in 2014. Treated with definitive IMRT (6000cGy completed 12/6/22) + 4 months ADT (final depot 1/10/23). PROSTATE CANCER: low risk Zapata 6 disease diagnosed 9/23/14, iPSA 3.1 and he went on active surveillance. MRI pelvis in 2015 found a PIRADS-3 lesion in the right apex PZ. Repeat biopsy 1/2016 still showed Zapata 6 disease. Continued surveillance. PSA up to 10 in 2021 and 11.58 in 2022 with repeat MRI in 5/2022 showing a new PIRADS-4 lesion in the right apex TZ with possible microscopic EPE. Repeat biopsy in 7/2022 now shows Zapata 4+3=7 disease in 1 core and 3+4=7 disease in the targeted lesion. Staging PSMA PET/CT only showed uptake in the prostate and he was treated with definitive IMRT to prostate/proximal SV (6000cGy/20fx) completed 12/6/22 along with 4 months of ADT.   ADT given with firmagon x4

## 2023-09-26 ENCOUNTER — HOSPITAL ENCOUNTER (OUTPATIENT)
Facility: HOSPITAL | Age: 83
Discharge: HOME | End: 2023-09-29

## 2023-09-26 VITALS
DIASTOLIC BLOOD PRESSURE: 79 MMHG | HEIGHT: 66 IN | WEIGHT: 188 LBS | RESPIRATION RATE: 16 BRPM | OXYGEN SATURATION: 97 % | HEART RATE: 63 BPM | SYSTOLIC BLOOD PRESSURE: 121 MMHG | BODY MASS INDEX: 30.22 KG/M2

## 2023-09-26 DIAGNOSIS — C61 PROSTATE CANCER (HCC): Primary | ICD-10-CM

## 2023-09-26 ASSESSMENT — PAIN SCALES - GENERAL: PAINLEVEL_OUTOF10: 0

## 2023-10-04 ENCOUNTER — IMMUNIZATION (OUTPATIENT)
Facility: CLINIC | Age: 83
End: 2023-10-04
Payer: MEDICARE

## 2023-10-04 VITALS
RESPIRATION RATE: 19 BRPM | HEART RATE: 78 BPM | TEMPERATURE: 98.5 F | SYSTOLIC BLOOD PRESSURE: 113 MMHG | OXYGEN SATURATION: 95 % | DIASTOLIC BLOOD PRESSURE: 58 MMHG

## 2023-10-04 DIAGNOSIS — Z23 ENCOUNTER FOR IMMUNIZATION: Primary | ICD-10-CM

## 2023-10-04 PROCEDURE — G0008 ADMIN INFLUENZA VIRUS VAC: HCPCS | Performed by: FAMILY MEDICINE

## 2023-10-04 PROCEDURE — 90694 VACC AIIV4 NO PRSRV 0.5ML IM: CPT | Performed by: FAMILY MEDICINE

## 2023-10-04 NOTE — PROGRESS NOTES
Patient here for flu shot only. Denies any recent illnesses. VIS provided.
Universal Safety Interventions

## 2023-10-26 ENCOUNTER — OFFICE VISIT (OUTPATIENT)
Age: 83
End: 2023-10-26
Payer: MEDICARE

## 2023-10-26 ENCOUNTER — OFFICE VISIT (OUTPATIENT)
Facility: CLINIC | Age: 83
End: 2023-10-26
Payer: MEDICARE

## 2023-10-26 VITALS
OXYGEN SATURATION: 98 % | WEIGHT: 191 LBS | HEIGHT: 66 IN | BODY MASS INDEX: 30.7 KG/M2 | HEART RATE: 78 BPM | DIASTOLIC BLOOD PRESSURE: 71 MMHG | SYSTOLIC BLOOD PRESSURE: 117 MMHG | RESPIRATION RATE: 18 BRPM

## 2023-10-26 VITALS
BODY MASS INDEX: 30.76 KG/M2 | RESPIRATION RATE: 14 BRPM | DIASTOLIC BLOOD PRESSURE: 68 MMHG | WEIGHT: 191.4 LBS | OXYGEN SATURATION: 94 % | SYSTOLIC BLOOD PRESSURE: 119 MMHG | HEART RATE: 69 BPM | HEIGHT: 66 IN | TEMPERATURE: 97.4 F

## 2023-10-26 DIAGNOSIS — H02.402 UNSPECIFIED PTOSIS OF LEFT EYELID: Primary | ICD-10-CM

## 2023-10-26 DIAGNOSIS — I50.22 CHRONIC SYSTOLIC CONGESTIVE HEART FAILURE (HCC): ICD-10-CM

## 2023-10-26 DIAGNOSIS — G70.00 MYASTHENIA GRAVIS WITHOUT (ACUTE) EXACERBATION (HCC): ICD-10-CM

## 2023-10-26 DIAGNOSIS — E03.4 ATROPHY OF THYROID (ACQUIRED): Primary | ICD-10-CM

## 2023-10-26 DIAGNOSIS — E78.00 PURE HYPERCHOLESTEROLEMIA, UNSPECIFIED: ICD-10-CM

## 2023-10-26 DIAGNOSIS — E11.9 DIET-CONTROLLED DIABETES MELLITUS (HCC): ICD-10-CM

## 2023-10-26 DIAGNOSIS — G56.03 CARPAL TUNNEL SYNDROME, BILATERAL UPPER LIMBS: ICD-10-CM

## 2023-10-26 PROCEDURE — G8484 FLU IMMUNIZE NO ADMIN: HCPCS

## 2023-10-26 PROCEDURE — G8417 CALC BMI ABV UP PARAM F/U: HCPCS | Performed by: FAMILY MEDICINE

## 2023-10-26 PROCEDURE — G8484 FLU IMMUNIZE NO ADMIN: HCPCS | Performed by: FAMILY MEDICINE

## 2023-10-26 PROCEDURE — 1123F ACP DISCUSS/DSCN MKR DOCD: CPT | Performed by: FAMILY MEDICINE

## 2023-10-26 PROCEDURE — G8417 CALC BMI ABV UP PARAM F/U: HCPCS

## 2023-10-26 PROCEDURE — 3044F HG A1C LEVEL LT 7.0%: CPT | Performed by: FAMILY MEDICINE

## 2023-10-26 PROCEDURE — 1036F TOBACCO NON-USER: CPT | Performed by: FAMILY MEDICINE

## 2023-10-26 PROCEDURE — 1036F TOBACCO NON-USER: CPT

## 2023-10-26 PROCEDURE — 99214 OFFICE O/P EST MOD 30 MIN: CPT | Performed by: FAMILY MEDICINE

## 2023-10-26 PROCEDURE — G8427 DOCREV CUR MEDS BY ELIG CLIN: HCPCS

## 2023-10-26 PROCEDURE — 99214 OFFICE O/P EST MOD 30 MIN: CPT

## 2023-10-26 PROCEDURE — G8427 DOCREV CUR MEDS BY ELIG CLIN: HCPCS | Performed by: FAMILY MEDICINE

## 2023-10-26 PROCEDURE — 1123F ACP DISCUSS/DSCN MKR DOCD: CPT

## 2023-10-26 RX ORDER — PYRIDOSTIGMINE BROMIDE 60 MG/1
60 TABLET ORAL 3 TIMES DAILY
Qty: 90 TABLET | Refills: 5 | Status: SHIPPED | OUTPATIENT
Start: 2023-10-26

## 2023-10-26 ASSESSMENT — ENCOUNTER SYMPTOMS: PHOTOPHOBIA: 1

## 2023-10-27 LAB
ERYTHROCYTE [DISTWIDTH] IN BLOOD BY AUTOMATED COUNT: 13.2 % (ref 11.5–14.5)
EST. AVERAGE GLUCOSE BLD GHB EST-MCNC: 123 MG/DL
HBA1C MFR BLD: 5.9 % (ref 4–5.6)
HCT VFR BLD AUTO: 48.4 % (ref 36.6–50.3)
HGB BLD-MCNC: 15 G/DL (ref 12.1–17)
MCH RBC QN AUTO: 32.5 PG (ref 26–34)
MCHC RBC AUTO-ENTMCNC: 31 G/DL (ref 30–36.5)
MCV RBC AUTO: 105 FL (ref 80–99)
NRBC # BLD: 0 K/UL (ref 0–0.01)
NRBC BLD-RTO: 0 PER 100 WBC
PLATELET # BLD AUTO: 190 K/UL (ref 150–400)
PMV BLD AUTO: 11.3 FL (ref 8.9–12.9)
RBC # BLD AUTO: 4.61 M/UL (ref 4.1–5.7)
T4 FREE SERPL-MCNC: 1 NG/DL (ref 0.8–1.5)
TSH SERPL DL<=0.05 MIU/L-ACNC: 2.09 UIU/ML (ref 0.36–3.74)
WBC # BLD AUTO: 6.9 K/UL (ref 4.1–11.1)

## 2023-10-28 LAB
ALBUMIN SERPL-MCNC: 4.3 G/DL (ref 3.5–5)
ALBUMIN/GLOB SERPL: 1.5 (ref 1.1–2.2)
ALP SERPL-CCNC: 63 U/L (ref 45–117)
ALT SERPL-CCNC: 24 U/L (ref 12–78)
ANION GAP SERPL CALC-SCNC: 5 MMOL/L (ref 5–15)
AST SERPL-CCNC: 17 U/L (ref 15–37)
BILIRUB SERPL-MCNC: 0.7 MG/DL (ref 0.2–1)
BUN SERPL-MCNC: 17 MG/DL (ref 6–20)
BUN/CREAT SERPL: 16 (ref 12–20)
CALCIUM SERPL-MCNC: 10.1 MG/DL (ref 8.5–10.1)
CHLORIDE SERPL-SCNC: 105 MMOL/L (ref 97–108)
CHOLEST SERPL-MCNC: 191 MG/DL
CO2 SERPL-SCNC: 28 MMOL/L (ref 21–32)
CREAT SERPL-MCNC: 1.04 MG/DL (ref 0.7–1.3)
GLOBULIN SER CALC-MCNC: 2.9 G/DL (ref 2–4)
GLUCOSE SERPL-MCNC: 130 MG/DL (ref 65–100)
HDLC SERPL-MCNC: 48 MG/DL
HDLC SERPL: 4 (ref 0–5)
LDLC SERPL CALC-MCNC: 118.6 MG/DL (ref 0–100)
POTASSIUM SERPL-SCNC: 5 MMOL/L (ref 3.5–5.1)
PROT SERPL-MCNC: 7.2 G/DL (ref 6.4–8.2)
SODIUM SERPL-SCNC: 138 MMOL/L (ref 136–145)
TRIGL SERPL-MCNC: 122 MG/DL
VLDLC SERPL CALC-MCNC: 24.4 MG/DL

## 2023-11-13 ENCOUNTER — HOSPITAL ENCOUNTER (OUTPATIENT)
Facility: HOSPITAL | Age: 83
Discharge: HOME | End: 2023-11-16
Payer: MEDICARE

## 2023-11-13 ENCOUNTER — TELEPHONE (OUTPATIENT)
Age: 83
End: 2023-11-13

## 2023-11-13 DIAGNOSIS — G70.00 MYASTHENIA GRAVIS WITHOUT (ACUTE) EXACERBATION (HCC): ICD-10-CM

## 2023-11-13 PROCEDURE — 71260 CT THORAX DX C+: CPT

## 2023-11-13 PROCEDURE — 6360000004 HC RX CONTRAST MEDICATION

## 2023-11-13 RX ORDER — IOPAMIDOL 612 MG/ML
100 INJECTION, SOLUTION INTRATHECAL
Status: COMPLETED | OUTPATIENT
Start: 2023-11-13 | End: 2023-11-13

## 2023-11-13 RX ADMIN — IOPAMIDOL 80 ML: 612 INJECTION, SOLUTION INTRATHECAL at 09:46

## 2023-11-13 NOTE — TELEPHONE ENCOUNTER
Called patient. No answer. Left a Vm informing him that one of our providers reviewed his CT chest stating, \"CT looks great. \"

## 2024-01-05 RX ORDER — TRANDOLAPRIL TABLETS 2 MG/1
TABLET ORAL
Qty: 90 TABLET | Refills: 0 | Status: SHIPPED | OUTPATIENT
Start: 2024-01-05

## 2024-01-29 ENCOUNTER — TELEPHONE (OUTPATIENT)
Age: 84
End: 2024-01-29

## 2024-01-29 NOTE — TELEPHONE ENCOUNTER
Called the patient and left a message to call us back to reschedule his appointment on 04.02.2024 due to provider will be out of the office.

## 2024-02-05 ENCOUNTER — TELEPHONE (OUTPATIENT)
Age: 84
End: 2024-02-05

## 2024-02-05 NOTE — TELEPHONE ENCOUNTER
Patient's 4/2/24 f/u appointment has been cancel due to Dr. Carney not being in the office. Patient's daughter requesting sooner appointment before September with Dr. Carney.     Please contact [phone]

## 2024-02-07 NOTE — TELEPHONE ENCOUNTER
Patient's daughter, Esther Villareal, on [employer], called back, verified and patient scheduled for 05/15/2024 with Dr. Carney.

## 2024-02-14 ENCOUNTER — OFFICE VISIT (OUTPATIENT)
Age: 84
End: 2024-02-14
Payer: MEDICARE

## 2024-02-14 VITALS
DIASTOLIC BLOOD PRESSURE: 70 MMHG | RESPIRATION RATE: 20 BRPM | HEART RATE: 64 BPM | BODY MASS INDEX: 29.89 KG/M2 | WEIGHT: 186 LBS | SYSTOLIC BLOOD PRESSURE: 117 MMHG | HEIGHT: 66 IN | OXYGEN SATURATION: 96 % | TEMPERATURE: 98 F

## 2024-02-14 DIAGNOSIS — I35.1 AORTIC EJECTION MURMUR: ICD-10-CM

## 2024-02-14 DIAGNOSIS — Z78.9 STATIN INTOLERANCE: ICD-10-CM

## 2024-02-14 DIAGNOSIS — I50.22 CHRONIC SYSTOLIC CONGESTIVE HEART FAILURE (HCC): Primary | ICD-10-CM

## 2024-02-14 DIAGNOSIS — E78.5 DYSLIPIDEMIA: ICD-10-CM

## 2024-02-14 DIAGNOSIS — I49.3 ASYMPTOMATIC PVCS: ICD-10-CM

## 2024-02-14 DIAGNOSIS — I25.10 CORONARY ARTERY DISEASE INVOLVING NATIVE CORONARY ARTERY OF NATIVE HEART WITHOUT ANGINA PECTORIS: ICD-10-CM

## 2024-02-14 DIAGNOSIS — I25.5 ISCHEMIC CARDIOMYOPATHY: ICD-10-CM

## 2024-02-14 PROCEDURE — 1123F ACP DISCUSS/DSCN MKR DOCD: CPT | Performed by: SPECIALIST

## 2024-02-14 PROCEDURE — 1036F TOBACCO NON-USER: CPT | Performed by: SPECIALIST

## 2024-02-14 PROCEDURE — G8484 FLU IMMUNIZE NO ADMIN: HCPCS | Performed by: SPECIALIST

## 2024-02-14 PROCEDURE — G8428 CUR MEDS NOT DOCUMENT: HCPCS | Performed by: SPECIALIST

## 2024-02-14 PROCEDURE — G8417 CALC BMI ABV UP PARAM F/U: HCPCS | Performed by: SPECIALIST

## 2024-02-14 PROCEDURE — 99214 OFFICE O/P EST MOD 30 MIN: CPT | Performed by: SPECIALIST

## 2024-02-14 NOTE — PROGRESS NOTES
Stanley Sandy     1940       Ray Mosher MD, [facility]  Date of Visit-2/14/2024   PCP is Abdiel Joel MD   [facility]  [facility]  HPI:  Stanley Sandy is a 83 y.o. male   6 month follow up   CHF, RCA MI, XOL, PVCs  Echo 12/16/22  EF 32%  DD, NCCMY, sclerosis AV with mild AS , mild MR, JOHNNY  Saw Dr Joel 4/26/23 with DM2 management    Today..  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  He had dense subendocardial infarct in the basal and mid inferolateral and lateral walls and MRI EF was 42%;  he has declined ICD.  Echo 2013 showed EF 30-35% global hypokinesia stress echo showed poor exercise capacity in a cath 9/24/2013 showed mild plaque with an EDP of 16 and no AV gradient  echo in December 2020 showed EF 30-35%. Mild septal wall hypertrophy.Mild posterior wall hypertrophy.RV: Mildly reduced systolic function.  · AO: Mild ascending aorta dilatation. Ascending aorta diameter = 4.3 cm.  · AV: Moderate aortic valve sclerosis with no significant stenosis.  · MV: Mild mitral valve regurgitation is present.  · PA: Pulmonary arterial systolic pressure is 29 mmHg.  XOL-gets joint pain if takes every day  Murmur with aortic sclerosis  PVCs, first degree AV block  Assessment/Plan:     1. Chronic systolic congestive heart failure (HCC)  Follow-up 6 months   NYHA I,  Continue CHF meds.  Declined AICD, history of PVCs  No change, stable  Key CAD CHF Meds            trandolapril (MAVIK) 2 MG tablet (Taking)    Sig: TAKE 1 TABLET NIGHTLY FOR LEFT VENTRICULAR DYSFUNCTION FOLLOWING MI    Renewals       Renewal provider: Abdiel Joel MD            carvedilol (COREG) 3.125 MG

## 2024-02-19 RX ORDER — LEVOTHYROXINE SODIUM 0.05 MG/1
TABLET ORAL
Qty: 90 TABLET | Refills: 1 | Status: SHIPPED | OUTPATIENT
Start: 2024-02-19 | End: 2024-02-20 | Stop reason: SDUPTHER

## 2024-02-19 RX ORDER — CARVEDILOL 3.12 MG/1
TABLET ORAL
Qty: 180 TABLET | Refills: 1 | Status: SHIPPED | OUTPATIENT
Start: 2024-02-19

## 2024-02-21 RX ORDER — LEVOTHYROXINE SODIUM 0.05 MG/1
TABLET ORAL
Qty: 14 TABLET | Refills: 0 | Status: SHIPPED | OUTPATIENT
Start: 2024-02-21

## 2024-03-11 DIAGNOSIS — G70.00 MYASTHENIA GRAVIS WITHOUT (ACUTE) EXACERBATION (HCC): ICD-10-CM

## 2024-03-11 DIAGNOSIS — H02.402 UNSPECIFIED PTOSIS OF LEFT EYELID: ICD-10-CM

## 2024-03-11 RX ORDER — PYRIDOSTIGMINE BROMIDE 60 MG/1
60 TABLET ORAL 3 TIMES DAILY
Qty: 90 TABLET | Refills: 3 | Status: SHIPPED | OUTPATIENT
Start: 2024-03-11

## 2024-04-04 RX ORDER — TRANDOLAPRIL TABLETS 2 MG/1
TABLET ORAL
Qty: 90 TABLET | Refills: 0 | Status: SHIPPED | OUTPATIENT
Start: 2024-04-04

## 2024-04-09 ENCOUNTER — OFFICE VISIT (OUTPATIENT)
Facility: CLINIC | Age: 84
End: 2024-04-09

## 2024-04-09 VITALS
HEART RATE: 74 BPM | BODY MASS INDEX: 29.12 KG/M2 | WEIGHT: 181.2 LBS | SYSTOLIC BLOOD PRESSURE: 113 MMHG | OXYGEN SATURATION: 96 % | HEIGHT: 66 IN | RESPIRATION RATE: 14 BRPM | DIASTOLIC BLOOD PRESSURE: 78 MMHG | TEMPERATURE: 97.7 F

## 2024-04-09 DIAGNOSIS — Z23 ENCOUNTER FOR IMMUNIZATION: ICD-10-CM

## 2024-04-09 DIAGNOSIS — E03.4 ATROPHY OF THYROID (ACQUIRED): ICD-10-CM

## 2024-04-09 DIAGNOSIS — G70.00 MYASTHENIA GRAVIS WITHOUT (ACUTE) EXACERBATION (HCC): ICD-10-CM

## 2024-04-09 DIAGNOSIS — E78.00 PURE HYPERCHOLESTEROLEMIA, UNSPECIFIED: ICD-10-CM

## 2024-04-09 DIAGNOSIS — C61 PROSTATE CANCER (HCC): ICD-10-CM

## 2024-04-09 DIAGNOSIS — Z00.00 MEDICARE ANNUAL WELLNESS VISIT, SUBSEQUENT: ICD-10-CM

## 2024-04-09 DIAGNOSIS — E11.9 DIET-CONTROLLED DIABETES MELLITUS (HCC): Primary | ICD-10-CM

## 2024-04-09 PROBLEM — E11.3592: Status: RESOLVED | Noted: 2023-02-02 | Resolved: 2024-04-09

## 2024-04-09 ASSESSMENT — PATIENT HEALTH QUESTIONNAIRE - PHQ9
SUM OF ALL RESPONSES TO PHQ QUESTIONS 1-9: 0
2. FEELING DOWN, DEPRESSED OR HOPELESS: NOT AT ALL
SUM OF ALL RESPONSES TO PHQ QUESTIONS 1-9: 0
1. LITTLE INTEREST OR PLEASURE IN DOING THINGS: NOT AT ALL
SUM OF ALL RESPONSES TO PHQ9 QUESTIONS 1 & 2: 0
SUM OF ALL RESPONSES TO PHQ QUESTIONS 1-9: 0
SUM OF ALL RESPONSES TO PHQ QUESTIONS 1-9: 0

## 2024-04-09 ASSESSMENT — LIFESTYLE VARIABLES
HOW MANY STANDARD DRINKS CONTAINING ALCOHOL DO YOU HAVE ON A TYPICAL DAY: PATIENT DOES NOT DRINK
HOW OFTEN DO YOU HAVE A DRINK CONTAINING ALCOHOL: NEVER

## 2024-04-09 NOTE — PATIENT INSTRUCTIONS
every 1-2 years to screen for glaucoma; cataracts, macular degeneration, and other eye disorders.  A preventive dental visit is recommended every 6 months.  Try to get at least 150 minutes of exercise per week or 10,000 steps per day on a pedometer .  Order or download the FREE \"Exercise & Physical Activity: Your Everyday Guide\" from [employer]. Call 1-292.673.3741 or search [employer] online.  You need 3665-2568 mg of calcium and 6410-4887 IU of vitamin D per day. It is possible to meet your calcium requirement with diet alone, but a vitamin D supplement is usually necessary to meet this goal.  When exposed to the sun, use a sunscreen that protects against both UVA and UVB radiation with an SPF of 30 or greater. Reapply every 2 to 3 hours or after sweating, drying off with a towel, or swimming.  Always wear a seat belt when traveling in a car. Always wear a helmet when riding a bicycle or motorcycle.

## 2024-04-09 NOTE — PROGRESS NOTES
Progress Note    Patient: Stanley Sandy MRN: [id number]  SSN: [id number]    YOB: 1940  Age: 83 y.o.  Sex: male        Chief Complaint   Patient presents with    Follow-up Chronic Condition    Medicare AWV     he is a 83 y.o. year old male who presents for follow up of chronic health conditions. Patient with hx of T2D, hypothyroidism, HLD and CHF. He is followed by neurology for MG. Patient denies HA, dizziness, SOB, CP, abdominal pain, dysuria, acute myalgias or arthralgias.    Encounter Diagnoses   Name Primary?    Medicare annual wellness visit, subsequent Yes     BP Readings from Last 3 Encounters:   04/09/24 113/78   02/14/24 117/70   12/18/23 95/87     Wt Readings from Last 3 Encounters:   04/09/24 82.2 kg (181 lb 3.2 oz)   02/14/24 84.4 kg (186 lb)   12/18/23 84.8 kg (187 lb)     Body mass index is 29.25 kg/m².    CMP:    Lab Results   Component Value Date/Time     10/26/2023 09:55 AM    K 5.0 10/26/2023 09:55 AM     10/26/2023 09:55 AM    CO2 28 10/26/2023 09:55 AM    BUN 17 10/26/2023 09:55 AM    CREATININE 1.04 10/26/2023 09:55 AM    GFRAA >60 03/01/2022 08:55 AM    AGRATIO 1.5 10/26/2023 09:55 AM    AGRATIO 1.4 02/02/2023 10:45 AM    LABGLOM >60 10/26/2023 09:55 AM    GLUCOSE 130 10/26/2023 09:55 AM    PROT 7.2 10/26/2023 09:55 AM    LABALBU 4.3 10/26/2023 09:55 AM    CALCIUM 10.1 10/26/2023 09:55 AM    BILITOT 0.7 10/26/2023 09:55 AM    ALKPHOS 63 10/26/2023 09:55 AM    ALKPHOS 62 02/02/2023 10:45 AM    AST 17 10/26/2023 09:55 AM    ALT 24 10/26/2023 09:55 AM     HgBA1c:    Lab Results   Component Value Date/Time    LABA1C 5.9 10/26/2023 09:55 AM     FLP:    Lab Results   Component Value Date/Time    TRIG 122 10/26/2023 09:55 AM    HDL 48 10/26/2023 09:55 AM    LDLCALC 118.6 10/26/2023 09:55 AM       Patient Active Problem List   Diagnosis    Colon polyps    Asymptomatic PVCs    Ischemic cardiomyopathy    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

## 2024-04-09 NOTE — PROGRESS NOTES
Chief Complaint   Patient presents with    Follow-up Chronic Condition    Medicare AWV         \"Have you been to the ER, urgent care clinic since your last visit?  Hospitalized since your last visit?\"    NO    “Have you seen or consulted any other health care providers outside of [facility] since your last visit?”    NO           Health Maintenance Due   Topic Date Due    Prostate Specific Antigen (PSA) Screening or Monitoring  Never done    Respiratory Syncytial Virus (RSV) Pregnant or age 60 yrs+ (1 - 1-dose 60+ series) Never done    DTaP/Tdap/Td vaccine (2 - Td or Tdap) 05/17/2021    Diabetic foot exam  03/22/2022    COVID-19 Vaccine (4 - 2023-24 season) 09/01/2023    Annual Wellness Visit (Medicare)  02/03/2024    A1C test (Diabetic or Prediabetic)  04/26/2024

## 2024-04-10 DIAGNOSIS — E78.00 PURE HYPERCHOLESTEROLEMIA, UNSPECIFIED: ICD-10-CM

## 2024-04-10 DIAGNOSIS — E11.9 DIET-CONTROLLED DIABETES MELLITUS (HCC): ICD-10-CM

## 2024-04-10 DIAGNOSIS — E03.4 ATROPHY OF THYROID (ACQUIRED): ICD-10-CM

## 2024-04-11 LAB
ALBUMIN SERPL-MCNC: 4 G/DL (ref 3.5–5)
ALBUMIN/GLOB SERPL: 1.5 (ref 1.1–2.2)
ALP SERPL-CCNC: 57 U/L (ref 45–117)
ALT SERPL-CCNC: 26 U/L (ref 12–78)
ANION GAP SERPL CALC-SCNC: 4 MMOL/L (ref 5–15)
AST SERPL-CCNC: 13 U/L (ref 15–37)
BILIRUB SERPL-MCNC: 0.9 MG/DL (ref 0.2–1)
BUN SERPL-MCNC: 14 MG/DL (ref 6–20)
BUN/CREAT SERPL: 14 (ref 12–20)
CALCIUM SERPL-MCNC: 9.8 MG/DL (ref 8.5–10.1)
CHLORIDE SERPL-SCNC: 106 MMOL/L (ref 97–108)
CHOLEST SERPL-MCNC: 158 MG/DL
CO2 SERPL-SCNC: 30 MMOL/L (ref 21–32)
CREAT SERPL-MCNC: 1.03 MG/DL (ref 0.7–1.3)
ERYTHROCYTE [DISTWIDTH] IN BLOOD BY AUTOMATED COUNT: 12.9 % (ref 11.5–14.5)
EST. AVERAGE GLUCOSE BLD GHB EST-MCNC: 114 MG/DL
GLOBULIN SER CALC-MCNC: 2.6 G/DL (ref 2–4)
GLUCOSE SERPL-MCNC: 170 MG/DL (ref 65–100)
HBA1C MFR BLD: 5.6 % (ref 4–5.6)
HCT VFR BLD AUTO: 45.8 % (ref 36.6–50.3)
HDLC SERPL-MCNC: 44 MG/DL
HDLC SERPL: 3.6 (ref 0–5)
HGB BLD-MCNC: 14.3 G/DL (ref 12.1–17)
LDLC SERPL CALC-MCNC: 101.8 MG/DL (ref 0–100)
MCH RBC QN AUTO: 33.3 PG (ref 26–34)
MCHC RBC AUTO-ENTMCNC: 31.2 G/DL (ref 30–36.5)
MCV RBC AUTO: 106.5 FL (ref 80–99)
NRBC # BLD: 0 K/UL (ref 0–0.01)
NRBC BLD-RTO: 0 PER 100 WBC
PLATELET # BLD AUTO: 175 K/UL (ref 150–400)
PMV BLD AUTO: 11.6 FL (ref 8.9–12.9)
POTASSIUM SERPL-SCNC: 4.6 MMOL/L (ref 3.5–5.1)
PROT SERPL-MCNC: 6.6 G/DL (ref 6.4–8.2)
RBC # BLD AUTO: 4.3 M/UL (ref 4.1–5.7)
SODIUM SERPL-SCNC: 140 MMOL/L (ref 136–145)
TRIGL SERPL-MCNC: 61 MG/DL
TSH SERPL DL<=0.05 MIU/L-ACNC: 1.72 UIU/ML (ref 0.36–3.74)
VLDLC SERPL CALC-MCNC: 12.2 MG/DL
WBC # BLD AUTO: 6.7 K/UL (ref 4.1–11.1)

## 2024-05-15 ENCOUNTER — TELEPHONE (OUTPATIENT)
Age: 84
End: 2024-05-15

## 2024-05-15 ENCOUNTER — OFFICE VISIT (OUTPATIENT)
Age: 84
End: 2024-05-15
Payer: MEDICARE

## 2024-05-15 VITALS
SYSTOLIC BLOOD PRESSURE: 126 MMHG | BODY MASS INDEX: 29.09 KG/M2 | OXYGEN SATURATION: 97 % | DIASTOLIC BLOOD PRESSURE: 82 MMHG | HEIGHT: 66 IN | HEART RATE: 62 BPM | WEIGHT: 181 LBS

## 2024-05-15 DIAGNOSIS — G56.23 LESION OF ULNAR NERVE, BILATERAL UPPER LIMBS: ICD-10-CM

## 2024-05-15 DIAGNOSIS — G70.00 MYASTHENIA GRAVIS WITHOUT (ACUTE) EXACERBATION (HCC): Primary | ICD-10-CM

## 2024-05-15 DIAGNOSIS — R93.89 ABNORMAL CT OF THE CHEST: ICD-10-CM

## 2024-05-15 DIAGNOSIS — F41.9 ANXIETY: Primary | ICD-10-CM

## 2024-05-15 DIAGNOSIS — G56.03 CARPAL TUNNEL SYNDROME, BILATERAL UPPER LIMBS: ICD-10-CM

## 2024-05-15 PROCEDURE — 1123F ACP DISCUSS/DSCN MKR DOCD: CPT | Performed by: PSYCHIATRY & NEUROLOGY

## 2024-05-15 PROCEDURE — 99214 OFFICE O/P EST MOD 30 MIN: CPT | Performed by: PSYCHIATRY & NEUROLOGY

## 2024-05-15 PROCEDURE — 1036F TOBACCO NON-USER: CPT | Performed by: PSYCHIATRY & NEUROLOGY

## 2024-05-15 PROCEDURE — G8427 DOCREV CUR MEDS BY ELIG CLIN: HCPCS | Performed by: PSYCHIATRY & NEUROLOGY

## 2024-05-15 PROCEDURE — G8417 CALC BMI ABV UP PARAM F/U: HCPCS | Performed by: PSYCHIATRY & NEUROLOGY

## 2024-05-15 NOTE — PROGRESS NOTES
Chief Complaint   Patient presents with    Follow-up     MG, ptosis of left eyelid     Vitals:    05/15/24 1053   BP: 126/82   Pulse: 62   SpO2: 97%

## 2024-05-15 NOTE — TELEPHONE ENCOUNTER
Patient's daughter states father gets anxious with MRI's and is requesting sedation to be sent to [facility] On file.  Daughter requesting phone once medication has been sent to pharmacy.

## 2024-05-15 NOTE — TELEPHONE ENCOUNTER
Patient called and LVM per Dr. Carney to notify that an MRI of the abdomen has been ordered, to look closer at the pancreas, as the CT done in 11/2023 showed and area of possible abnormality. Patient asked to call back with any questions.

## 2024-05-15 NOTE — PROGRESS NOTES
Chief Complaint   Patient presents with    Follow-up     MG, ptosis of left eyelid         HISTORY OF PRESENT ILLNESS  Stanley Sandy came back for follow-up.  He was last seen in the clinic in October 2023  by Romy and seen by me back in 2022 regarding antibody-positive myasthenia gravis with ocular symptoms  Clinically he has been doing fine and denies any symptoms including ptosis, diplopia or any systemic symptoms including fatigue, shortness of breath etc.  Only takes pyridostigmine 60 mg twice a day, once in the morning and once at night  Continues follow-up with ophthalmology and eye exam has been stable.  He had CT chest done which was negative for thymoma  He had treatment for prostate cancer and is now in remission    Current Outpatient Medications   Medication Sig    trandolapril (MAVIK) 2 MG tablet TAKE 1 TABLET NIGHTLY FOR LEFT VENTRICULAR DYSFUNCTION FOLLOWING MI    pyRIDostigmine (MESTINON) 60 MG tablet TAKE ONE TABLET BY MOUTH THREE TIMES DAILY    levothyroxine (SYNTHROID) 50 MCG tablet TAKE 1 TABLET DAILY BEFORE BREAKFAST    carvedilol (COREG) 3.125 MG tablet TAKE 1 TABLET TWICE A DAY WITH MEALS    FREESTYLE LITE strip     Multiple Vitamins-Minerals (MULTIVITAMIN ADULTS 50+ PO) Take by mouth    aspirin 81 MG EC tablet Take by mouth daily    vitamin D 25 MCG (1000 UT) CAPS Take by mouth daily    LORazepam (ATIVAN) 1 MG tablet Take 1 tablet 1 hour prior to MRI.  May repeat x1 if needed     No current facility-administered medications for this visit.       PHYSICAL EXAMINATION:    Vitals:    05/15/24 1053   BP: 126/82   Pulse: 62   SpO2: 97%       NEUROLOGICAL EXAMINATION:     Mental Status:   Alert and oriented to person, place, and time.  Attention span and concentration are normal. Speech is fluent .      Cranial Nerves:    II, III, IV, VI:  Visual acuity grossly intact. Visual fields are normal.    Pupils are equal, round, and reactive.    Extra-ocular movements are full and fluid.

## 2024-05-16 DIAGNOSIS — R93.89 ABNORMAL CT OF THE CHEST: Primary | ICD-10-CM

## 2024-05-16 RX ORDER — DIAZEPAM 5 MG/1
TABLET ORAL
Qty: 2 TABLET | Refills: 0 | Status: SHIPPED | OUTPATIENT
Start: 2024-05-16 | End: 2024-05-30

## 2024-05-16 RX ORDER — DIAZEPAM 5 MG/1
TABLET ORAL
Qty: 2 TABLET | Refills: 0 | Status: CANCELLED | OUTPATIENT
Start: 2024-05-16 | End: 2024-05-18

## 2024-05-16 NOTE — TELEPHONE ENCOUNTER
Called patient's daughter. No answer. Left a VM stating the doctor sent in the prescription. I have left a VM of the directions as well.

## 2024-05-17 ENCOUNTER — HOSPITAL ENCOUNTER (OUTPATIENT)
Facility: HOSPITAL | Age: 84
End: 2024-05-17
Attending: PSYCHIATRY & NEUROLOGY
Payer: MEDICARE

## 2024-05-17 DIAGNOSIS — R93.89 ABNORMAL CT OF THE CHEST: ICD-10-CM

## 2024-05-17 PROCEDURE — 6360000004 HC RX CONTRAST MEDICATION: Performed by: PSYCHIATRY & NEUROLOGY

## 2024-05-17 PROCEDURE — 74183 MRI ABD W/O CNTR FLWD CNTR: CPT

## 2024-05-17 PROCEDURE — A9575 INJ GADOTERATE MEGLUMI 0.1ML: HCPCS | Performed by: PSYCHIATRY & NEUROLOGY

## 2024-05-17 RX ORDER — GADOTERATE MEGLUMINE 376.9 MG/ML
17 INJECTION INTRAVENOUS ONCE
Status: COMPLETED | OUTPATIENT
Start: 2024-05-17 | End: 2024-05-17

## 2024-05-17 RX ADMIN — GADOTERATE MEGLUMINE 17 ML: 376.9 INJECTION, SOLUTION INTRAVENOUS at 14:54

## 2024-05-20 DIAGNOSIS — D49.0 INTRADUCTAL PAPILLARY MUCINOUS NEOPLASM: Primary | ICD-10-CM

## 2024-05-21 ENCOUNTER — TELEPHONE (OUTPATIENT)
Age: 84
End: 2024-05-21

## 2024-05-21 DIAGNOSIS — D49.0 MUCINOUS NEOPLASM OF PANCREAS: Primary | ICD-10-CM

## 2024-05-21 NOTE — TELEPHONE ENCOUNTER
Attempted to contact patient regarding referral for Mucinous neoplasm of pancreas. Patient did not answer, LVM requesting a return call if interested in scheduling. Will follow up.

## 2024-05-29 ENCOUNTER — OFFICE VISIT (OUTPATIENT)
Age: 84
End: 2024-05-29
Payer: MEDICARE

## 2024-05-29 VITALS
HEART RATE: 83 BPM | TEMPERATURE: 98.3 F | HEIGHT: 66 IN | WEIGHT: 180.2 LBS | BODY MASS INDEX: 28.96 KG/M2 | SYSTOLIC BLOOD PRESSURE: 97 MMHG | DIASTOLIC BLOOD PRESSURE: 53 MMHG | RESPIRATION RATE: 14 BRPM | OXYGEN SATURATION: 96 %

## 2024-05-29 DIAGNOSIS — D49.0 INTRADUCTAL PAPILLARY MUCINOUS NEOPLASM: Primary | ICD-10-CM

## 2024-05-29 PROCEDURE — 1123F ACP DISCUSS/DSCN MKR DOCD: CPT | Performed by: SURGERY

## 2024-05-29 PROCEDURE — 99203 OFFICE O/P NEW LOW 30 MIN: CPT | Performed by: SURGERY

## 2024-05-29 PROCEDURE — 1036F TOBACCO NON-USER: CPT | Performed by: SURGERY

## 2024-05-29 PROCEDURE — G8427 DOCREV CUR MEDS BY ELIG CLIN: HCPCS | Performed by: SURGERY

## 2024-05-29 PROCEDURE — G8417 CALC BMI ABV UP PARAM F/U: HCPCS | Performed by: SURGERY

## 2024-05-29 ASSESSMENT — PATIENT HEALTH QUESTIONNAIRE - PHQ9
SUM OF ALL RESPONSES TO PHQ QUESTIONS 1-9: 0
SUM OF ALL RESPONSES TO PHQ9 QUESTIONS 1 & 2: 0
SUM OF ALL RESPONSES TO PHQ QUESTIONS 1-9: 0
SUM OF ALL RESPONSES TO PHQ QUESTIONS 1-9: 0
2. FEELING DOWN, DEPRESSED OR HOPELESS: NOT AT ALL
1. LITTLE INTEREST OR PLEASURE IN DOING THINGS: NOT AT ALL
SUM OF ALL RESPONSES TO PHQ QUESTIONS 1-9: 0

## 2024-05-29 NOTE — PROGRESS NOTES
Surgery History and Physical    Subjective:      Stanley Sandy  is a 83 y.o.   male who presents with an MRI showing cystic lesion(s) in the pancreas. H ehad a CT of the chest to rule out thymoma as a cause of his myasthenia.  On that study there was note of a cystic lesion in the pancreas.  MRI was done and shows what looks like 1 or 2 side branceh cysts in the neck of the pancreas.   He has no abdoinal complaints, no history of diarrhea or abdominal pain.  No back pain.  No history of pancreatitis.    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    Prostate cancer (HCC)     Bernice 6, watch and wait strategy    RMSF ([place] spotted fever)     T2DM (type 2 diabetes mellitus) (HCC) 8/21/2013       Past Surgical History:   Procedure Laterality Date    HERNIA REPAIR      left inguinal x 2    CA UNLISTED PROCEDURE CARDIAC SURGERY         Social History     Tobacco Use    Smoking status: Never     Passive exposure: Never    Smokeless tobacco: Never   Substance Use Topics    Alcohol use: No       Family History   Problem Relation Age of Onset    Cancer Sister         breast    Diabetes Mother     Hypertension Mother     Cancer Father         GI       Current Outpatient Medications on File Prior to Visit   Medication Sig Dispense Refill    trandolapril (MAVIK) 2 MG tablet TAKE 1 TABLET NIGHTLY FOR LEFT VENTRICULAR DYSFUNCTION FOLLOWING MI 90 tablet 0    pyRIDostigmine (MESTINON) 60 MG tablet TAKE ONE TABLET BY MOUTH THREE TIMES DAILY 90 tablet 3    levothyroxine (SYNTHROID) 50 MCG tablet TAKE 1 TABLET DAILY BEFORE BREAKFAST 14 tablet 0    carvedilol (COREG) 3.125 MG tablet TAKE 1 TABLET TWICE A DAY WITH MEALS 180 tablet 1    FREESTYLE LITE strip       Multiple Vitamins-Minerals (MULTIVITAMIN ADULTS 50+ PO) Take by mouth      aspirin 81 MG EC tablet Take by

## 2024-05-29 NOTE — PROGRESS NOTES
Identified patient with two patient identifiers (name and ). Reviewed chart in preparation for visit and have obtained necessary documentation.    Stanley Sandy is a 83 y.o. male  Chief Complaint   Patient presents with    New Patient     Mucinous neoplasm of pancreas      BP (!) 97/53 (Site: Right Upper Arm, Position: Sitting, Cuff Size: Large Adult)   Pulse 83   Temp 98.3 °F (36.8 °C) (Oral)   Resp 14   Ht 1.676 m (5' 6\")   Wt 81.7 kg (180 lb 3.2 oz)   SpO2 96%   BMI 29.09 kg/m²     1. Have you been to the ER, urgent care clinic since your last visit?  Hospitalized since your last visit?no    2. Have you seen or consulted any other health care providers outside of the [facility] since your last visit?  Include any pap smears or colon screening. Yes

## 2024-05-30 ENCOUNTER — TELEPHONE (OUTPATIENT)
Age: 84
End: 2024-05-30

## 2024-05-30 DIAGNOSIS — D49.0 INTRADUCTAL PAPILLARY MUCINOUS NEOPLASM: Primary | ICD-10-CM

## 2024-05-30 NOTE — TELEPHONE ENCOUNTER
Spoke with his wife and told her the conference agreed completely with another scan in a year, nothing else.  Will make those arrangements.

## 2024-06-03 RX ORDER — TRANDOLAPRIL TABLETS 2 MG/1
2 TABLET ORAL DAILY
Qty: 90 TABLET | Refills: 1 | Status: SHIPPED | OUTPATIENT
Start: 2024-06-03

## 2024-08-14 ENCOUNTER — OFFICE VISIT (OUTPATIENT)
Age: 84
End: 2024-08-14
Payer: MEDICARE

## 2024-08-14 ENCOUNTER — TELEPHONE (OUTPATIENT)
Age: 84
End: 2024-08-14

## 2024-08-14 VITALS
TEMPERATURE: 97.8 F | RESPIRATION RATE: 18 BRPM | SYSTOLIC BLOOD PRESSURE: 117 MMHG | HEIGHT: 66 IN | WEIGHT: 176 LBS | BODY MASS INDEX: 28.28 KG/M2 | HEART RATE: 65 BPM | DIASTOLIC BLOOD PRESSURE: 70 MMHG | OXYGEN SATURATION: 97 %

## 2024-08-14 DIAGNOSIS — E78.5 DYSLIPIDEMIA: ICD-10-CM

## 2024-08-14 DIAGNOSIS — I25.5 ISCHEMIC CARDIOMYOPATHY: ICD-10-CM

## 2024-08-14 DIAGNOSIS — I35.0 NONRHEUMATIC AORTIC VALVE STENOSIS: ICD-10-CM

## 2024-08-14 DIAGNOSIS — Z78.9 STATIN INTOLERANCE: ICD-10-CM

## 2024-08-14 DIAGNOSIS — I50.22 CHRONIC SYSTOLIC CONGESTIVE HEART FAILURE (HCC): Primary | ICD-10-CM

## 2024-08-14 DIAGNOSIS — I25.10 CORONARY ARTERY DISEASE INVOLVING NATIVE CORONARY ARTERY OF NATIVE HEART WITHOUT ANGINA PECTORIS: ICD-10-CM

## 2024-08-14 DIAGNOSIS — I49.3 ASYMPTOMATIC PVCS: ICD-10-CM

## 2024-08-14 PROCEDURE — 93000 ELECTROCARDIOGRAM COMPLETE: CPT | Performed by: SPECIALIST

## 2024-08-14 PROCEDURE — G8417 CALC BMI ABV UP PARAM F/U: HCPCS | Performed by: SPECIALIST

## 2024-08-14 PROCEDURE — 99214 OFFICE O/P EST MOD 30 MIN: CPT | Performed by: SPECIALIST

## 2024-08-14 PROCEDURE — 1036F TOBACCO NON-USER: CPT | Performed by: SPECIALIST

## 2024-08-14 PROCEDURE — G8428 CUR MEDS NOT DOCUMENT: HCPCS | Performed by: SPECIALIST

## 2024-08-14 PROCEDURE — 1123F ACP DISCUSS/DSCN MKR DOCD: CPT | Performed by: SPECIALIST

## 2024-08-14 NOTE — PROGRESS NOTES
Chief Complaint   Patient presents with    6 Month Follow-Up       Blood pressure 117/70, pulse 65, temperature 97.8 °F (36.6 °C), temperature source Oral, resp. rate 18, height 1.676 m (5' 6\"), weight 79.8 kg (176 lb), SpO2 97%.         Chest pain:  Denies     Shortness of breath:  Denies     Edema: Denies     Palpitations, skipped beats, rapid heartbeat:  Denies     Dizziness: Denies     Fatigue: Denies      \"Have you been to the ER, urgent care clinic since your last visit?  Hospitalized since your last visit?\"    NO    “Have you seen or consulted any other health care providers outside of [facility] since your last visit?”    NO

## 2024-08-14 NOTE — PROGRESS NOTES
Stanley Sandy     1940       Ray Mosher MD, [facility]  Date of Visit-8/14/2024   PCP is Abdiel Joel MD   [facility] [place]  [facility]  HPI:  Stanley Sandy is a 83 y.o. male   6 month follow up   CHF, RCA MI, XOL, PVCs  Echo 12/16/22  EF 32%  DD, NCCMY, sclerosis AV with mild AS , mild MR, JOHNNY  Saw Dr Joel 4/26/23 with DM2 management    Today..  Since last OV, has pancreatic cyst and MG work up with GenSurg and Neuro notes reviewed   Generally feeling well has no complaints.  Noted ectopy on physical exam so we got an EKG that showed sinus rhythm at a rate of 70 with a first-degree block IA interval is at 282 QRS was widened at 146 with right bundle branch block and left axis deviation with a QRS axis of -70.    QTc is normal at 466.    2nd Tracing reveals PVCs  Soft murmur on exam, no edema  Denies chest pain, edema, syncope or shortness of breath at rest   Has no tachycardia , palpitations or sense of arrythmia      Reduced EF, CHF with Ischemic CMY  MRI 2014 showed RCA infarct.  He had dense subendocardial infarct in the basal and mid inferolateral and lateral walls and MRI EF was 42%;  he has declined ICD.  Echo 2013 showed EF 30-35% global hypokinesia stress echo showed poor exercise capacity in a cath 9/24/2013 showed mild plaque with an EDP of 16 and no AV gradient  echo in December 2020 showed EF 30-35%. Mild septal wall hypertrophy.Mild posterior wall hypertrophy.RV: Mildly reduced systolic function.  · AO: Mild ascending aorta dilatation. Ascending aorta diameter = 4.3 cm.  · AV: Moderate aortic valve sclerosis with no significant stenosis.  · MV: Mild mitral valve regurgitation is present.  · PA: Pulmonary arterial systolic pressure is 29 mmHg.  XOL-gets joint pain if takes every day  Murmur with aortic sclerosis  PVCs, first degree AV block  Assessment/Plan:     1. Chronic systolic congestive heart failure (HCC)  Follow-up 6 months with echo

## 2024-08-14 NOTE — TELEPHONE ENCOUNTER
Needs a 6mo fu (Feb 2025) along with Echo/ Dx: CHF & AS with Dr Mosher @ the [facility] office, l/m on W vm requesting a c/b to [facility] appt.

## 2024-09-03 DIAGNOSIS — E03.4 ATROPHY OF THYROID (ACQUIRED): ICD-10-CM

## 2024-09-03 DIAGNOSIS — E78.00 PURE HYPERCHOLESTEROLEMIA, UNSPECIFIED: ICD-10-CM

## 2024-09-03 DIAGNOSIS — E11.9 DIET-CONTROLLED DIABETES MELLITUS (HCC): Primary | ICD-10-CM

## 2024-09-03 RX ORDER — LEVOTHYROXINE SODIUM 50 UG/1
50 TABLET ORAL DAILY
Qty: 90 TABLET | Refills: 1 | Status: SHIPPED | OUTPATIENT
Start: 2024-09-03

## 2024-09-03 RX ORDER — LEVOTHYROXINE SODIUM 50 UG/1
50 TABLET ORAL DAILY
Qty: 20 TABLET | Refills: 0 | Status: SHIPPED | OUTPATIENT
Start: 2024-09-03 | End: 2024-09-03 | Stop reason: SDUPTHER

## 2024-09-03 RX ORDER — CARVEDILOL 3.12 MG/1
3.12 TABLET ORAL 2 TIMES DAILY WITH MEALS
Qty: 180 TABLET | Refills: 1 | Status: SHIPPED | OUTPATIENT
Start: 2024-09-03

## 2024-09-03 NOTE — TELEPHONE ENCOUNTER
Patient is due for fasting lab work. Monitoring of labs recommended by cardiologist. These have been ordered.

## 2024-09-03 NOTE — TELEPHONE ENCOUNTER
Called patient, spoke with dtrEsther. She was advised per Dr Joel:\"Patient is due for fasting lab work. Monitoring of labs recommended by cardiologist. These have been ordered.\" Verbalized understanding. She was advised patient needs to schedule 6 month follow up OV from 6-9-24. Verbalized understanding,.

## 2024-09-03 NOTE — TELEPHONE ENCOUNTER
Pt's needs a refill on the following medications:    -trandolapril (MAVIK) 2 MG   -carvedilol (COREG) 3.125 MG     Pt is requesting at least 3  refills on each rx for automatic refill for mail order.    Please advise..

## 2024-09-03 NOTE — TELEPHONE ENCOUNTER
Pt came in stating he needs a short supply of this medication to be sent to [facility].    Please advise..

## 2024-09-04 DIAGNOSIS — E03.4 ATROPHY OF THYROID (ACQUIRED): ICD-10-CM

## 2024-09-04 DIAGNOSIS — E11.9 DIET-CONTROLLED DIABETES MELLITUS (HCC): ICD-10-CM

## 2024-09-04 DIAGNOSIS — E78.00 PURE HYPERCHOLESTEROLEMIA, UNSPECIFIED: ICD-10-CM

## 2024-09-05 LAB
ALBUMIN SERPL-MCNC: 4.3 G/DL (ref 3.5–5)
ALBUMIN/GLOB SERPL: 1.6 (ref 1.1–2.2)
ALP SERPL-CCNC: 68 U/L (ref 45–117)
ALT SERPL-CCNC: 35 U/L (ref 12–78)
ANION GAP SERPL CALC-SCNC: 4 MMOL/L (ref 2–12)
AST SERPL-CCNC: 16 U/L (ref 15–37)
BILIRUB SERPL-MCNC: 1 MG/DL (ref 0.2–1)
BUN SERPL-MCNC: 17 MG/DL (ref 6–20)
BUN/CREAT SERPL: 16 (ref 12–20)
CALCIUM SERPL-MCNC: 10.7 MG/DL (ref 8.5–10.1)
CHLORIDE SERPL-SCNC: 103 MMOL/L (ref 97–108)
CHOLEST SERPL-MCNC: 154 MG/DL
CO2 SERPL-SCNC: 32 MMOL/L (ref 21–32)
CREAT SERPL-MCNC: 1.09 MG/DL (ref 0.7–1.3)
EST. AVERAGE GLUCOSE BLD GHB EST-MCNC: 117 MG/DL
GLOBULIN SER CALC-MCNC: 2.7 G/DL (ref 2–4)
GLUCOSE SERPL-MCNC: 145 MG/DL (ref 65–100)
HBA1C MFR BLD: 5.7 % (ref 4–5.6)
HDLC SERPL-MCNC: 43 MG/DL
HDLC SERPL: 3.6 (ref 0–5)
LDLC SERPL CALC-MCNC: 97 MG/DL (ref 0–100)
POTASSIUM SERPL-SCNC: 4.1 MMOL/L (ref 3.5–5.1)
PROT SERPL-MCNC: 7 G/DL (ref 6.4–8.2)
SODIUM SERPL-SCNC: 139 MMOL/L (ref 136–145)
T4 FREE SERPL-MCNC: 1.1 NG/DL (ref 0.8–1.5)
TRIGL SERPL-MCNC: 70 MG/DL
TSH SERPL DL<=0.05 MIU/L-ACNC: 2.2 UIU/ML (ref 0.36–3.74)
VLDLC SERPL CALC-MCNC: 14 MG/DL

## 2024-09-08 DIAGNOSIS — H02.402 UNSPECIFIED PTOSIS OF LEFT EYELID: ICD-10-CM

## 2024-09-08 DIAGNOSIS — G70.00 MYASTHENIA GRAVIS WITHOUT (ACUTE) EXACERBATION (HCC): ICD-10-CM

## 2024-09-09 RX ORDER — PYRIDOSTIGMINE BROMIDE 60 MG/1
60 TABLET ORAL 2 TIMES DAILY
Qty: 180 TABLET | Refills: 1 | Status: SHIPPED | OUTPATIENT
Start: 2024-09-09

## 2024-10-14 ENCOUNTER — OFFICE VISIT (OUTPATIENT)
Facility: CLINIC | Age: 84
End: 2024-10-14

## 2024-10-14 VITALS
TEMPERATURE: 97.9 F | WEIGHT: 178.4 LBS | DIASTOLIC BLOOD PRESSURE: 63 MMHG | SYSTOLIC BLOOD PRESSURE: 95 MMHG | HEIGHT: 66 IN | RESPIRATION RATE: 14 BRPM | HEART RATE: 69 BPM | BODY MASS INDEX: 28.67 KG/M2 | OXYGEN SATURATION: 96 %

## 2024-10-14 DIAGNOSIS — G70.00 MYASTHENIA GRAVIS WITHOUT (ACUTE) EXACERBATION (HCC): ICD-10-CM

## 2024-10-14 DIAGNOSIS — E11.9 DIET-CONTROLLED DIABETES MELLITUS (HCC): Primary | ICD-10-CM

## 2024-10-14 DIAGNOSIS — E03.4 ATROPHY OF THYROID (ACQUIRED): ICD-10-CM

## 2024-10-14 DIAGNOSIS — E78.00 PURE HYPERCHOLESTEROLEMIA, UNSPECIFIED: ICD-10-CM

## 2024-10-14 DIAGNOSIS — Z23 ENCOUNTER FOR IMMUNIZATION: ICD-10-CM

## 2024-10-14 PROBLEM — E13.8: Status: RESOLVED | Noted: 2018-04-16 | Resolved: 2024-10-14

## 2024-10-14 RX ORDER — BLOOD-GLUCOSE METER
KIT MISCELLANEOUS
Qty: 100 EACH | Refills: 1 | Status: SHIPPED | OUTPATIENT
Start: 2024-10-14

## 2024-10-14 SDOH — ECONOMIC STABILITY: INCOME INSECURITY: HOW HARD IS IT FOR YOU TO PAY FOR THE VERY BASICS LIKE FOOD, HOUSING, MEDICAL CARE, AND HEATING?: NOT HARD AT ALL

## 2024-10-14 SDOH — ECONOMIC STABILITY: FOOD INSECURITY: WITHIN THE PAST 12 MONTHS, THE FOOD YOU BOUGHT JUST DIDN'T LAST AND YOU DIDN'T HAVE MONEY TO GET MORE.: NEVER TRUE

## 2024-10-14 SDOH — ECONOMIC STABILITY: FOOD INSECURITY: WITHIN THE PAST 12 MONTHS, YOU WORRIED THAT YOUR FOOD WOULD RUN OUT BEFORE YOU GOT MONEY TO BUY MORE.: NEVER TRUE

## 2024-10-14 NOTE — PROGRESS NOTES
Chief Complaint   Patient presents with    Follow-up Chronic Condition      \"Have you been to the ER, urgent care clinic since your last visit?  Hospitalized since your last visit?\"    NO    “Have you seen or consulted any other health care providers outside of [facility] since your last visit?”    YES, [facility] Dr Sy            Click Here for Release of Records Request     Health Maintenance Due   Topic Date Due    Prostate Specific Antigen (PSA) Screening or Monitoring  Never done    Respiratory Syncytial Virus (RSV) Pregnant or age 60 yrs+ (1 - 1-dose 60+ series) Never done    Diabetic foot exam  03/22/2022    Flu vaccine (1) 08/01/2024    COVID-19 Vaccine (4 - 2023-24 season) 09/01/2024

## 2024-10-30 RX ORDER — TRANDOLAPRIL TABLETS 2 MG/1
2 TABLET ORAL DAILY
Qty: 30 TABLET | Refills: 0 | Status: SHIPPED | OUTPATIENT
Start: 2024-10-30
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
## 2024-12-06 ENCOUNTER — APPOINTMENT (OUTPATIENT)
Facility: HOSPITAL | Age: 84
DRG: 293 | End: 2024-12-06
Payer: MEDICARE

## 2024-12-06 ENCOUNTER — TELEPHONE (OUTPATIENT)
Age: 84
End: 2024-12-06

## 2024-12-06 ENCOUNTER — HOSPITAL ENCOUNTER (INPATIENT)
Facility: HOSPITAL | Age: 84
LOS: 1 days | Discharge: HOME | DRG: 293 | End: 2024-12-07
Attending: EMERGENCY MEDICINE | Admitting: FAMILY MEDICINE
Payer: MEDICARE

## 2024-12-06 DIAGNOSIS — I50.9 ACUTE ON CHRONIC CONGESTIVE HEART FAILURE, UNSPECIFIED HEART FAILURE TYPE (HCC): ICD-10-CM

## 2024-12-06 DIAGNOSIS — I50.23 ACUTE ON CHRONIC SYSTOLIC CONGESTIVE HEART FAILURE (HCC): Primary | ICD-10-CM

## 2024-12-06 LAB
ALBUMIN SERPL-MCNC: 3.7 G/DL (ref 3.5–5)
ALBUMIN/GLOB SERPL: 1.2 (ref 1.1–2.2)
ALP SERPL-CCNC: 78 U/L (ref 45–117)
ALT SERPL-CCNC: 38 U/L (ref 12–78)
ANION GAP SERPL CALC-SCNC: 7 MMOL/L (ref 2–12)
AST SERPL-CCNC: 41 U/L (ref 15–37)
BASOPHILS # BLD: 0 K/UL (ref 0–0.1)
BASOPHILS NFR BLD: 1 % (ref 0–1)
BILIRUB SERPL-MCNC: 2.2 MG/DL (ref 0.2–1)
BUN SERPL-MCNC: 18 MG/DL (ref 6–20)
BUN/CREAT SERPL: 16 (ref 12–20)
CALCIUM SERPL-MCNC: 10 MG/DL (ref 8.5–10.1)
CHLORIDE SERPL-SCNC: 105 MMOL/L (ref 97–108)
CO2 SERPL-SCNC: 27 MMOL/L (ref 21–32)
CREAT SERPL-MCNC: 1.13 MG/DL (ref 0.7–1.3)
DIFFERENTIAL METHOD BLD: ABNORMAL
ECHO AO ASC DIAM: 4.1 CM
ECHO AO ASCENDING AORTA INDEX: 2.18 CM/M2
ECHO AR MAX VEL PISA: 3.8 M/S
ECHO AV AREA VTI: 1.5 CM2
ECHO AV AREA/BSA VTI: 0.8 CM2/M2
ECHO AV MEAN GRADIENT: 15 MMHG
ECHO AV MEAN VELOCITY: 1.8 M/S
ECHO AV PEAK GRADIENT: 26 MMHG
ECHO AV PEAK VELOCITY: 2.5 M/S
ECHO AV REGURGITANT PHT: 761.1 MILLISECOND
ECHO AV VELOCITY RATIO: 0.44
ECHO AV VTI: 51.9 CM
ECHO BSA: 1.91 M2
ECHO EST RA PRESSURE: 15 MMHG
ECHO LA DIAMETER INDEX: 2.07 CM/M2
ECHO LA DIAMETER: 3.9 CM
ECHO LA VOL A-L A2C: 57 ML (ref 18–58)
ECHO LA VOL A-L A4C: 56 ML (ref 18–58)
ECHO LA VOL BP: 53 ML (ref 18–58)
ECHO LA VOL MOD A2C: 53 ML (ref 18–58)
ECHO LA VOL MOD A4C: 52 ML (ref 18–58)
ECHO LA VOL/BSA BIPLANE: 28 ML/M2 (ref 16–34)
ECHO LA VOLUME AREA LENGTH: 57 ML
ECHO LA VOLUME INDEX A-L A2C: 30 ML/M2 (ref 16–34)
ECHO LA VOLUME INDEX A-L A4C: 30 ML/M2 (ref 16–34)
ECHO LA VOLUME INDEX AREA LENGTH: 30 ML/M2 (ref 16–34)
ECHO LA VOLUME INDEX MOD A2C: 28 ML/M2 (ref 16–34)
ECHO LA VOLUME INDEX MOD A4C: 28 ML/M2 (ref 16–34)
ECHO LV E' LATERAL VELOCITY: 8.21 CM/S
ECHO LV E' SEPTAL VELOCITY: 4.1 CM/S
ECHO LV EDV A2C: 154 ML
ECHO LV EDV A4C: 148 ML
ECHO LV EDV BP: 153 ML (ref 67–155)
ECHO LV EDV INDEX A4C: 79 ML/M2
ECHO LV EDV INDEX BP: 81 ML/M2
ECHO LV EDV NDEX A2C: 82 ML/M2
ECHO LV EF PHYSICIAN: 25 %
ECHO LV EJECTION FRACTION A2C: 38 %
ECHO LV EJECTION FRACTION A4C: 51 %
ECHO LV ESV A2C: 95 ML
ECHO LV ESV A4C: 72 ML
ECHO LV ESV BP: 83 ML (ref 22–58)
ECHO LV ESV INDEX A2C: 51 ML/M2
ECHO LV ESV INDEX A4C: 38 ML/M2
ECHO LV ESV INDEX BP: 44 ML/M2
ECHO LV FRACTIONAL SHORTENING: 11 % (ref 28–44)
ECHO LV INTERNAL DIMENSION DIASTOLE INDEX: 3.24 CM/M2
ECHO LV INTERNAL DIMENSION DIASTOLIC: 6.1 CM (ref 4.2–5.9)
ECHO LV INTERNAL DIMENSION SYSTOLIC INDEX: 2.87 CM/M2
ECHO LV INTERNAL DIMENSION SYSTOLIC: 5.4 CM
ECHO LV IVSD: 0.8 CM (ref 0.6–1)
ECHO LV MASS 2D: 191.6 G (ref 88–224)
ECHO LV MASS INDEX 2D: 101.9 G/M2 (ref 49–115)
ECHO LV POSTERIOR WALL DIASTOLIC: 0.8 CM (ref 0.6–1)
ECHO LV RELATIVE WALL THICKNESS RATIO: 0.26
ECHO LVOT AREA: 3.5 CM2
ECHO LVOT AV VTI INDEX: 0.48
ECHO LVOT DIAM: 2.1 CM
ECHO LVOT MEAN GRADIENT: 2 MMHG
ECHO LVOT PEAK GRADIENT: 5 MMHG
ECHO LVOT PEAK VELOCITY: 1.1 M/S
ECHO LVOT STROKE VOLUME INDEX: 45.7 ML/M2
ECHO LVOT SV: 85.9 ML
ECHO LVOT VTI: 24.8 CM
ECHO MV A VELOCITY: 0.57 M/S
ECHO MV E DECELERATION TIME (DT): 228.1 MS
ECHO MV E VELOCITY: 0.59 M/S
ECHO MV E/A RATIO: 1.04
ECHO MV E/E' LATERAL: 7.19
ECHO MV E/E' RATIO (AVERAGED): 10.79
ECHO MV E/E' SEPTAL: 14.39
ECHO MV REGURGITANT PEAK GRADIENT: 18 MMHG
ECHO MV REGURGITANT PEAK VELOCITY: 2.1 M/S
ECHO PV MAX VELOCITY: 0.7 M/S
ECHO PV PEAK GRADIENT: 2 MMHG
ECHO RIGHT VENTRICULAR SYSTOLIC PRESSURE (RVSP): 41 MMHG
ECHO RV FREE WALL PEAK S': 7 CM/S
ECHO RV INTERNAL DIMENSION: 5 CM
ECHO RV TAPSE: 1.3 CM (ref 1.7–?)
ECHO RVOT MEAN GRADIENT: 1 MMHG
ECHO RVOT PEAK GRADIENT: 1 MMHG
ECHO RVOT PEAK VELOCITY: 0.6 M/S
ECHO RVOT VTI: 13.5 CM
ECHO TV REGURGITANT MAX VELOCITY: 2.56 M/S
ECHO TV REGURGITANT PEAK GRADIENT: 27 MMHG
EKG ATRIAL RATE: 65 BPM
EKG DIAGNOSIS: NORMAL
EKG P AXIS: 40 DEGREES
EKG P-R INTERVAL: 272 MS
EKG Q-T INTERVAL: 498 MS
EKG QRS DURATION: 154 MS
EKG QTC CALCULATION (BAZETT): 517 MS
EKG R AXIS: 259 DEGREES
EKG T AXIS: 73 DEGREES
EKG VENTRICULAR RATE: 65 BPM
EOSINOPHIL # BLD: 0.1 K/UL (ref 0–0.4)
EOSINOPHIL NFR BLD: 1 % (ref 0–7)
ERYTHROCYTE [DISTWIDTH] IN BLOOD BY AUTOMATED COUNT: 13.5 % (ref 11.5–14.5)
GLOBULIN SER CALC-MCNC: 3.1 G/DL (ref 2–4)
GLUCOSE SERPL-MCNC: 127 MG/DL (ref 65–100)
HCT VFR BLD AUTO: 43.8 % (ref 36.6–50.3)
HGB BLD-MCNC: 14.2 G/DL (ref 12.1–17)
IMM GRANULOCYTES # BLD AUTO: 0 K/UL (ref 0–0.04)
IMM GRANULOCYTES NFR BLD AUTO: 0 % (ref 0–0.5)
LIPASE SERPL-CCNC: 73 U/L (ref 13–75)
LYMPHOCYTES # BLD: 1.1 K/UL (ref 0.8–3.5)
LYMPHOCYTES NFR BLD: 13 % (ref 12–49)
MCH RBC QN AUTO: 33.3 PG (ref 26–34)
MCHC RBC AUTO-ENTMCNC: 32.4 G/DL (ref 30–36.5)
MCV RBC AUTO: 102.6 FL (ref 80–99)
MONOCYTES # BLD: 0.6 K/UL (ref 0–1)
MONOCYTES NFR BLD: 7 % (ref 5–13)
NEUTS SEG # BLD: 6.4 K/UL (ref 1.8–8)
NEUTS SEG NFR BLD: 78 % (ref 32–75)
NRBC # BLD: 0 K/UL (ref 0–0.01)
NRBC BLD-RTO: 0 PER 100 WBC
NT PRO BNP: 5124 PG/ML
PLATELET # BLD AUTO: 135 K/UL (ref 150–400)
PMV BLD AUTO: 12.1 FL (ref 8.9–12.9)
POTASSIUM SERPL-SCNC: 4.8 MMOL/L (ref 3.5–5.1)
PROT SERPL-MCNC: 6.8 G/DL (ref 6.4–8.2)
RBC # BLD AUTO: 4.27 M/UL (ref 4.1–5.7)
SODIUM SERPL-SCNC: 139 MMOL/L (ref 136–145)
TROPONIN I SERPL HS-MCNC: 33 NG/L (ref 0–76)
WBC # BLD AUTO: 8.2 K/UL (ref 4.1–11.1)

## 2024-12-06 PROCEDURE — 93306 TTE W/DOPPLER COMPLETE: CPT | Performed by: SPECIALIST

## 2024-12-06 PROCEDURE — 80053 COMPREHEN METABOLIC PANEL: CPT

## 2024-12-06 PROCEDURE — 93005 ELECTROCARDIOGRAM TRACING: CPT | Performed by: EMERGENCY MEDICINE

## 2024-12-06 PROCEDURE — 93010 ELECTROCARDIOGRAM REPORT: CPT | Performed by: STUDENT IN AN ORGANIZED HEALTH CARE EDUCATION/TRAINING PROGRAM

## 2024-12-06 PROCEDURE — 99223 1ST HOSP IP/OBS HIGH 75: CPT | Performed by: STUDENT IN AN ORGANIZED HEALTH CARE EDUCATION/TRAINING PROGRAM

## 2024-12-06 PROCEDURE — 85025 COMPLETE CBC W/AUTO DIFF WBC: CPT

## 2024-12-06 PROCEDURE — 93005 ELECTROCARDIOGRAM TRACING: CPT | Performed by: FAMILY MEDICINE

## 2024-12-06 PROCEDURE — 84484 ASSAY OF TROPONIN QUANT: CPT

## 2024-12-06 PROCEDURE — 6360000002 HC RX W HCPCS: Performed by: EMERGENCY MEDICINE

## 2024-12-06 PROCEDURE — 6360000002 HC RX W HCPCS

## 2024-12-06 PROCEDURE — APPSS30 APP SPLIT SHARED TIME 16-30 MINUTES: Performed by: NURSE PRACTITIONER

## 2024-12-06 PROCEDURE — 93306 TTE W/DOPPLER COMPLETE: CPT

## 2024-12-06 PROCEDURE — 6370000000 HC RX 637 (ALT 250 FOR IP)

## 2024-12-06 PROCEDURE — 99285 EMERGENCY DEPT VISIT HI MDM: CPT

## 2024-12-06 PROCEDURE — 2580000003 HC RX 258

## 2024-12-06 PROCEDURE — 71045 X-RAY EXAM CHEST 1 VIEW: CPT

## 2024-12-06 PROCEDURE — 6370000000 HC RX 637 (ALT 250 FOR IP): Performed by: STUDENT IN AN ORGANIZED HEALTH CARE EDUCATION/TRAINING PROGRAM

## 2024-12-06 PROCEDURE — 94761 N-INVAS EAR/PLS OXIMETRY MLT: CPT

## 2024-12-06 PROCEDURE — 83690 ASSAY OF LIPASE: CPT

## 2024-12-06 PROCEDURE — 83880 ASSAY OF NATRIURETIC PEPTIDE: CPT

## 2024-12-06 PROCEDURE — 2060000000 HC ICU INTERMEDIATE R&B

## 2024-12-06 PROCEDURE — 36415 COLL VENOUS BLD VENIPUNCTURE: CPT

## 2024-12-06 PROCEDURE — 96374 THER/PROPH/DIAG INJ IV PUSH: CPT

## 2024-12-06 RX ORDER — LOSARTAN POTASSIUM 25 MG/1
12.5 TABLET ORAL EVERY 24 HOURS
Status: DISCONTINUED | OUTPATIENT
Start: 2024-12-06 | End: 2024-12-07 | Stop reason: HOSPADM

## 2024-12-06 RX ORDER — POLYETHYLENE GLYCOL 3350 17 G/17G
17 POWDER, FOR SOLUTION ORAL DAILY PRN
Status: DISCONTINUED | OUTPATIENT
Start: 2024-12-06 | End: 2024-12-07 | Stop reason: HOSPADM

## 2024-12-06 RX ORDER — LISINOPRIL 5 MG/1
10 TABLET ORAL DAILY
Status: DISCONTINUED | OUTPATIENT
Start: 2024-12-06 | End: 2024-12-06

## 2024-12-06 RX ORDER — FUROSEMIDE 10 MG/ML
20 INJECTION INTRAMUSCULAR; INTRAVENOUS ONCE
Status: DISCONTINUED | OUTPATIENT
Start: 2024-12-06 | End: 2024-12-06

## 2024-12-06 RX ORDER — ASPIRIN 81 MG/1
81 TABLET ORAL DAILY
Status: DISCONTINUED | OUTPATIENT
Start: 2024-12-07 | End: 2024-12-07 | Stop reason: HOSPADM

## 2024-12-06 RX ORDER — ACETAMINOPHEN 650 MG/1
650 SUPPOSITORY RECTAL EVERY 6 HOURS PRN
Status: DISCONTINUED | OUTPATIENT
Start: 2024-12-06 | End: 2024-12-07 | Stop reason: HOSPADM

## 2024-12-06 RX ORDER — SODIUM CHLORIDE 0.9 % (FLUSH) 0.9 %
5-40 SYRINGE (ML) INJECTION EVERY 12 HOURS SCHEDULED
Status: DISCONTINUED | OUTPATIENT
Start: 2024-12-06 | End: 2024-12-07 | Stop reason: HOSPADM

## 2024-12-06 RX ORDER — SODIUM CHLORIDE 0.9 % (FLUSH) 0.9 %
5-40 SYRINGE (ML) INJECTION PRN
Status: DISCONTINUED | OUTPATIENT
Start: 2024-12-06 | End: 2024-12-07 | Stop reason: HOSPADM

## 2024-12-06 RX ORDER — FUROSEMIDE 10 MG/ML
40 INJECTION INTRAMUSCULAR; INTRAVENOUS ONCE
Status: COMPLETED | OUTPATIENT
Start: 2024-12-06 | End: 2024-12-06

## 2024-12-06 RX ORDER — SODIUM CHLORIDE 9 MG/ML
INJECTION, SOLUTION INTRAVENOUS PRN
Status: DISCONTINUED | OUTPATIENT
Start: 2024-12-06 | End: 2024-12-07 | Stop reason: HOSPADM

## 2024-12-06 RX ORDER — FUROSEMIDE 10 MG/ML
20 INJECTION INTRAMUSCULAR; INTRAVENOUS ONCE
Status: COMPLETED | OUTPATIENT
Start: 2024-12-06 | End: 2024-12-06

## 2024-12-06 RX ORDER — PYRIDOSTIGMINE BROMIDE 60 MG/1
60 TABLET ORAL 2 TIMES DAILY
Status: DISCONTINUED | OUTPATIENT
Start: 2024-12-06 | End: 2024-12-07 | Stop reason: HOSPADM

## 2024-12-06 RX ORDER — LEVOTHYROXINE SODIUM 50 UG/1
50 TABLET ORAL DAILY
Status: DISCONTINUED | OUTPATIENT
Start: 2024-12-07 | End: 2024-12-07 | Stop reason: HOSPADM

## 2024-12-06 RX ORDER — FUROSEMIDE 40 MG/1
40 TABLET ORAL DAILY
Status: DISCONTINUED | OUTPATIENT
Start: 2024-12-07 | End: 2024-12-07

## 2024-12-06 RX ORDER — ENOXAPARIN SODIUM 100 MG/ML
40 INJECTION SUBCUTANEOUS EVERY 24 HOURS
Status: DISCONTINUED | OUTPATIENT
Start: 2024-12-06 | End: 2024-12-07 | Stop reason: HOSPADM

## 2024-12-06 RX ORDER — ACETAMINOPHEN 325 MG/1
650 TABLET ORAL EVERY 6 HOURS PRN
Status: DISCONTINUED | OUTPATIENT
Start: 2024-12-06 | End: 2024-12-07 | Stop reason: HOSPADM

## 2024-12-06 RX ORDER — CARVEDILOL 3.12 MG/1
3.12 TABLET ORAL 2 TIMES DAILY WITH MEALS
Status: DISCONTINUED | OUTPATIENT
Start: 2024-12-06 | End: 2024-12-07 | Stop reason: HOSPADM

## 2024-12-06 RX ORDER — LISINOPRIL 5 MG/1
10 TABLET ORAL DAILY
Status: DISCONTINUED | OUTPATIENT
Start: 2024-12-07 | End: 2024-12-06

## 2024-12-06 RX ADMIN — FUROSEMIDE 20 MG: 10 INJECTION, SOLUTION INTRAVENOUS at 18:13

## 2024-12-06 RX ADMIN — PYRIDOSTIGMINE BROMIDE 60 MG: 60 TABLET ORAL at 22:11

## 2024-12-06 RX ADMIN — EMPAGLIFLOZIN 10 MG: 10 TABLET, FILM COATED ORAL at 18:10

## 2024-12-06 RX ADMIN — SODIUM CHLORIDE, PRESERVATIVE FREE 10 ML: 5 INJECTION INTRAVENOUS at 22:11

## 2024-12-06 RX ADMIN — ENOXAPARIN SODIUM 40 MG: 100 INJECTION SUBCUTANEOUS at 14:45

## 2024-12-06 RX ADMIN — Medication 6 MG: at 22:11

## 2024-12-06 RX ADMIN — FUROSEMIDE 40 MG: 10 INJECTION, SOLUTION INTRAVENOUS at 13:26

## 2024-12-06 ASSESSMENT — PAIN - FUNCTIONAL ASSESSMENT: PAIN_FUNCTIONAL_ASSESSMENT: NONE - DENIES PAIN

## 2024-12-06 NOTE — H&P
[address] [id number]   Office [phone]  Fax [phone]       Admission H&P     Name: Stanley Sandy MRN: [id number]  Sex: Male   YOB: 1940  Age: 84 y.o.  PCP: Abdiel Joel MD     Source of Information: patient, medical records    Chief complaint: Exertional dyspnea    History of Present Illness  Stanley Sandy is a 84 y.o. male with known history of myasthenia gravis, prostate cancer (in remission), pancreatic IPMN, ischemic cardiomyopathy, HTN who presents to the ER complaining of exertional dyspnea.    Patient reports that over the last 3 days, he has noticed getting short of breath walking the 60 yards to his workshop.  Patient has never had the symptoms before.  He also notices that his legs have been swelling over this time.  He has never had these symptoms before, but has been told to watch out for leg swelling by his cardiologist. He follows with Dr. Damon for ischemic cardiomyopathy and for the past ~10 years.  He states his ejection fraction is 30 to 35% and he is due for an echocardiogram in 25 days, however he does not take a diuretic at home. Patient denies chest pain, epigastric pain, sweating, nausea/vomiting.  Denies recent illness.    Notably patient has history of myasthenia gravis for which only symptom is L ptosis.  Takes pyridostigmine twice daily.    In the ER:      Vitals:  Patient Vitals for the past 8 hrs:   Temp Pulse Resp BP SpO2   12/06/24 1326 -- -- -- 112/72 --   12/06/24 1143 97.5 °F (36.4 °C) 67 22 (!) 116/56 97 %         Labs:   Recent Labs     12/06/24  1139   WBC 8.2   HGB 14.2   HCT 43.8   *     Recent Labs     12/06/24  1139      K 4.8      CO2 27   BUN 18     Recent Labs     12/06/24  1139   GLOB 3.1     No results for input(s): \"INR\", \"APTT\" in the last 72 hours.    Invalid input(s): \"PTP\"   Invalid input(s): \"PHI\", \"PCO2I\", \"PO2I\", \"FIO2I\"  No results for input(s): \"CPK\", \"CKMB\" in the last 72

## 2024-12-06 NOTE — CONSULTS
[facility] CARDIOLOGY                    Cardiology Care Note     [x]Initial Encounter     []Follow-up    Patient Name: Stanley Sandy - :1940 - MRN:[id number]  Primary Cardiologist: Ray Mosher MD  Consulting Cardiologist: Abdon Hannah DO     Reason for encounter: CHF    HPI:       Stanley Sandy is a 84 y.o. male with PMH significant for HFrEF, AS, ICM, CAD, HLD, PVC's, DM, hypothyroidism, myasthenia gravis and prostate CA.     Pt presented to the ED with complaints of worsening SOB, more noticeable with exertion. Associated with increased lower ext edema and some abdominal bloating. Denies any CP. He was not on a diuretic at home. On admission, noted to have elevated pBNP to > 5000.     Subjective:      Stanley Sandy reports HERRERA, edema.      Assessment and Plan     Acute on chronic HFrEF, ICM   - known EF of 30-35% for last few years  - has declined ICD in the past  - update echo  - agree w/ IV diuresis for today, may need more tomorrow  - was not on diuretic at home, would d/c on lasix 40 mg PO    - cont coreg, lisinopril (sub for Mavik which he is on PTA)  - could consider stopping ACE in favor of starting Entresto     2. Low flow/low gradient severe AS  - had been monitoring OP  - repeat echo to evaluate     3. CAD   - prior RCA infarct on MRI, cath  showed mild plaque   - cont ASA, statin     4. PVC's, RBBB  - chronic     Repeat echo, likely home tomorrow on diuretic regimen. Discussed w/ pt. Will arrange follow up with Dr. Mosher        ____________________________________________________________    Cardiac testing  22    TRANSTHORACIC ECHOCARDIOGRAM (TTE) COMPLETE (CONTRAST/BUBBLE/3D PRN) 2022  2:01 PM, 2022 12:00 AM (Final)    Narrative  This is a summary report. The complete report is available in the patient's medical record. If you cannot access the medical record, please contact the sending organization for a detailed fax or copy.    Moderately reduced left  2023.    Cardiomediastinal silhouette is mildly enlarged. Lungs are clear bilaterally. No  pleural fluid is visualized. There is no pneumothorax.    Impression  Mild cardiomegaly. Clear lungs.      Electronically signed by Juan Antonio Wiseman MD      CT Result (most recent):  CT CHEST W CONTRAST 11/13/2023    Narrative  INDICATION: Myasthenia gravis without (acute) exacerbation    COMPARISON: PET CT scan 10/3/2022    TECHNIQUE: Following intravenous administration of 80 cc Isovue-300, 5 mm axial  images were obtained through the chest. Coronal and sagittal reformats were  generated.  CT dose reduction was achieved through use of a standardized  protocol tailored for this examination and automatic exposure control for dose  modulation.    FINDINGS:    CHEST WALL: No mass or axillary lymphadenopathy.  THYROID: No nodule. The left lobe of the thyroid gland has been.  MEDIASTINUM: No mass or lymphadenopathy. No anterior mediastinal mass.  MONICA: No mass or lymphadenopathy.  THORACIC AORTA: No dissection or aneurysm.  MAIN PULMONARY ARTERY: Normal in caliber.  TRACHEA/BRONCHI: Patent.  ESOPHAGUS: No wall thickening or dilatation.  HEART: Normal in size. Coronary artery calcification is present  PLEURA: No effusion or pneumothorax.  LUNGS: Mild scarring at the lung bases is noted. There is mild atelectasis in  the left lower lobe adjacent to the descending thoracic aorta. Central airways  are patent.    Upper abdomen: 1 cm nodular density in the left adrenal gland is noted and  unchanged. There is a small hiatal hernia.    There are nonobstructing calcifications in the kidneys bilaterally. There is a  cyst off the midpole of left kidney.    In the superior aspect of the body of the pancreas is an oval-shaped 15 mm by 10  mm hypodensity. There there is a smaller adjacent hypodensity.. The larger  lesion does measure fluid like density.. This was not convincingly present on  the previous examinations.. This is indeterminate. This  pyRIDostigmine (MESTINON) tablet 60 mg, 60 mg, Oral, BID, Tricia Gutiérrez MD    [START ON 12/7/2024] lisinopril (PRINIVIL;ZESTRIL) tablet 10 mg, 10 mg, Oral, Daily, Tricia Gutiérrez MD    furosemide (LASIX) injection 20 mg, 20 mg, IntraVENous, Once, Tricia Gutiérrez MD    Current Outpatient Medications:     trandolapril (MAVIK) 2 MG tablet, Take 1 tablet by mouth daily, Disp: 30 tablet, Rfl: 0    B Complex Vitamins (B COMPLEX PO), Take 1 tablet by mouth daily, Disp: , Rfl:     pyRIDostigmine (MESTINON) 60 MG tablet, Take 1 tablet by mouth in the morning and at bedtime, Disp: 180 tablet, Rfl: 1    carvedilol (COREG) 3.125 MG tablet, Take 1 tablet by mouth 2 times daily (with meals), Disp: 180 tablet, Rfl: 1    Multiple Vitamins-Minerals (MULTIVITAMIN ADULTS 50+ PO), Take 1 tablet by mouth daily, Disp: , Rfl:     aspirin 81 MG EC tablet, Take by mouth daily, Disp: , Rfl:     vitamin D 25 MCG (1000 UT) CAPS, Take by mouth daily, Disp: , Rfl:     FREESTYLE LITE strip, As needed., Disp: 100 each, Rfl: 1    levothyroxine (SYNTHROID) 50 MCG tablet, Take 1 tablet by mouth Daily 30 minutes prior to first meal., Disp: 90 tablet, Rfl: 1    KISHORE Goodman NP    [facility]  Call center: P) [phone] (F) [phone]      CC:Abdiel Joel MD

## 2024-12-06 NOTE — TELEPHONE ENCOUNTER
Telephone call made to patient's daughter w/ pt on speaker phone. Pt was sob x3d with swelling. Saw PCP who advised they go to ER. Pt is getting IV lasix now and they are contemplating if he should be admitted. Looks like echo was also ordered. Educated pt and daughter on CHF and fluid volume overload. Daughter verbalizes understanding and just wanted MARI Bateman and Dr. Mosher to be aware.    Future Appointments   Date Time Provider Department Center   2/14/2025 11:00 AM [facility] ECHO 1 [facility] BS AMB   2/14/2025 11:40 AM Ray Mosher MD [facility] BS AMB   5/20/2025 11:20 AM Mikey Carney MD [facility] BS AMB

## 2024-12-06 NOTE — ED PROVIDER NOTES
[facility] EMERGENCY DEPT  EMERGENCY DEPARTMENT ENCOUNTER      Pt Name: Stanley Sandy  MRN: [id number]  Birthdate 1940  Date of evaluation: 12/6/2024  Provider: Thierry Domingo MD    CHIEF COMPLAINT       Chief Complaint   Patient presents with    Shortness of Breath         HISTORY OF PRESENT ILLNESS   (Location/Symptom, Timing/Onset, Context/Setting, Quality, Duration, Modifying Factors, Severity)  Note limiting factors.   84-year-old with a history of diabetes, hyperlipidemia, ischemic cardiomyopathy/chronic systolic CHF, coronary disease, hypothyroidism.  He presents with a 2-day history of shortness of breath.  It is especially noticeable with exertion.  He has noted lower extremity swelling and abdominal \"bloating.\"  He denies chest pain, fever, cough.  His appetite has been somewhat decreased.  He has been having normal bowel movements.  He denies a history of similar symptoms.          Review of External Medical Records:     Nursing Notes were reviewed.    REVIEW OF SYSTEMS    (2-9 systems for level 4, 10 or more for level 5)     Review of Systems    Except as noted above the remainder of the review of systems was reviewed and negative.       PAST MEDICAL HISTORY     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    Prostate cancer (HCC)     Bernice 6, watch and wait strategy    RMSF (Ben Mountain spotted fever)     T2DM (type 2 diabetes mellitus) (HCC) 8/21/2013         SURGICAL HISTORY       Past Surgical History:   Procedure Laterality Date    HERNIA REPAIR      left inguinal x 2    VT UNLISTED PROCEDURE CARDIAC SURGERY           CURRENT MEDICATIONS       Previous Medications    ASPIRIN 81 MG EC TABLET    Take by mouth daily    B COMPLEX VITAMINS (B COMPLEX PO)    Take 1 tablet by mouth daily    CARVEDILOL (COREG) 3.125 MG TABLET    Take 1 tablet by mouth 2  to 7 for level 4, 8 or more for level 5)     ED Triage Vitals   BP Systolic BP Percentile Diastolic BP Percentile Temp Temp src Pulse Resp SpO2   -- -- -- -- -- -- -- --      Height Weight         -- --             There is no height or weight on file to calculate BMI.    Physical Exam  Vitals and nursing note reviewed.   Constitutional:       Appearance: He is well-developed.   HENT:      Head: Normocephalic and atraumatic.      Mouth/Throat:      Mouth: Mucous membranes are moist.   Eyes:      Conjunctiva/sclera: Conjunctivae normal.   Cardiovascular:      Rate and Rhythm: Normal rate and regular rhythm.      Heart sounds: Murmur heard.   Pulmonary:      Effort: Pulmonary effort is normal.      Comments: Left basilar crackles  Abdominal:      General: There is no distension.      Tenderness: There is no abdominal tenderness.   Musculoskeletal:         General: No swelling or deformity.      Cervical back: No rigidity.   Skin:     General: Skin is warm and dry.   Neurological:      General: No focal deficit present.      Mental Status: He is alert.   Psychiatric:         Mood and Affect: Mood normal.         DIAGNOSTIC RESULTS     EKG: All EKG's are interpreted by the Emergency Department Physician who either signs or Co-signs this chart in the absence of a cardiologist.    EKG: Normal sinus rhythm; rate of 64, first-degree AV block; PVCs; right bundle branch block; nonspecific ST, T wave abnormalities.  Interpreted by me.  11:20 AM.  Thierry Domingo MD        RADIOLOGY:   Non-plain film images such as CT, Ultrasound and MRI are read by the radiologist. Plain radiographic images are visualized and preliminarily interpreted by the emergency physician with the below findings:        Interpretation per the Radiologist below, if available at the time of this note:    XR CHEST PORTABLE    (Results Pending)        LABS:  Labs Reviewed   CBC WITH AUTO DIFFERENTIAL   COMPREHENSIVE METABOLIC PANEL   TROPONIN   BRAIN

## 2024-12-06 NOTE — TELEPHONE ENCOUNTER
Patient's daughter Esther is calling because her father has been experiencing some SOB, leg swelling.Patient is in [facility] ER currently to be seen.Patient has been given some medicine to help with the swelling in his legs.    [phone] Esther (daughter)

## 2024-12-06 NOTE — ED TRIAGE NOTES
Patient arrives to ed via pov. Pt c/o shortness of breath x 3 days that worsens with exertion, bloating in abdomen, tightness and swelling bilateral legs. Pt denies CP, or any further complaints.

## 2024-12-07 VITALS
RESPIRATION RATE: 18 BRPM | SYSTOLIC BLOOD PRESSURE: 116 MMHG | DIASTOLIC BLOOD PRESSURE: 63 MMHG | WEIGHT: 181.88 LBS | HEART RATE: 66 BPM | BODY MASS INDEX: 29.23 KG/M2 | HEIGHT: 66 IN | TEMPERATURE: 97.7 F | OXYGEN SATURATION: 95 %

## 2024-12-07 PROBLEM — I50.9 ACUTE DECOMPENSATED HEART FAILURE (HCC): Status: RESOLVED | Noted: 2024-12-06 | Resolved: 2024-12-07

## 2024-12-07 PROBLEM — I50.23 ACUTE ON CHRONIC SYSTOLIC CONGESTIVE HEART FAILURE (HCC): Status: RESOLVED | Noted: 2024-12-06 | Resolved: 2024-12-07

## 2024-12-07 LAB
ANION GAP SERPL CALC-SCNC: 6 MMOL/L (ref 2–12)
BUN SERPL-MCNC: 19 MG/DL (ref 6–20)
BUN/CREAT SERPL: 19 (ref 12–20)
CALCIUM SERPL-MCNC: 9.5 MG/DL (ref 8.5–10.1)
CHLORIDE SERPL-SCNC: 104 MMOL/L (ref 97–108)
CO2 SERPL-SCNC: 29 MMOL/L (ref 21–32)
CREAT SERPL-MCNC: 1 MG/DL (ref 0.7–1.3)
ERYTHROCYTE [DISTWIDTH] IN BLOOD BY AUTOMATED COUNT: 13.5 % (ref 11.5–14.5)
GLUCOSE SERPL-MCNC: 110 MG/DL (ref 65–100)
HCT VFR BLD AUTO: 42.1 % (ref 36.6–50.3)
HGB BLD-MCNC: 13.6 G/DL (ref 12.1–17)
MAGNESIUM SERPL-MCNC: 1.9 MG/DL (ref 1.6–2.4)
MCH RBC QN AUTO: 33.1 PG (ref 26–34)
MCHC RBC AUTO-ENTMCNC: 32.3 G/DL (ref 30–36.5)
MCV RBC AUTO: 102.4 FL (ref 80–99)
NRBC # BLD: 0 K/UL (ref 0–0.01)
NRBC BLD-RTO: 0 PER 100 WBC
PHOSPHATE SERPL-MCNC: 4.6 MG/DL (ref 2.6–4.7)
PLATELET # BLD AUTO: 113 K/UL (ref 150–400)
PMV BLD AUTO: 11.7 FL (ref 8.9–12.9)
POTASSIUM SERPL-SCNC: 3.4 MMOL/L (ref 3.5–5.1)
RBC # BLD AUTO: 4.11 M/UL (ref 4.1–5.7)
SODIUM SERPL-SCNC: 139 MMOL/L (ref 136–145)
WBC # BLD AUTO: 6.3 K/UL (ref 4.1–11.1)

## 2024-12-07 PROCEDURE — 99232 SBSQ HOSP IP/OBS MODERATE 35: CPT | Performed by: SPECIALIST

## 2024-12-07 PROCEDURE — 85027 COMPLETE CBC AUTOMATED: CPT

## 2024-12-07 PROCEDURE — 97116 GAIT TRAINING THERAPY: CPT

## 2024-12-07 PROCEDURE — 6370000000 HC RX 637 (ALT 250 FOR IP)

## 2024-12-07 PROCEDURE — 83735 ASSAY OF MAGNESIUM: CPT

## 2024-12-07 PROCEDURE — 84100 ASSAY OF PHOSPHORUS: CPT

## 2024-12-07 PROCEDURE — 97161 PT EVAL LOW COMPLEX 20 MIN: CPT

## 2024-12-07 PROCEDURE — 80048 BASIC METABOLIC PNL TOTAL CA: CPT

## 2024-12-07 PROCEDURE — 6370000000 HC RX 637 (ALT 250 FOR IP): Performed by: STUDENT IN AN ORGANIZED HEALTH CARE EDUCATION/TRAINING PROGRAM

## 2024-12-07 PROCEDURE — 36415 COLL VENOUS BLD VENIPUNCTURE: CPT

## 2024-12-07 PROCEDURE — 94761 N-INVAS EAR/PLS OXIMETRY MLT: CPT

## 2024-12-07 PROCEDURE — 99238 HOSP IP/OBS DSCHRG MGMT 30/<: CPT | Performed by: STUDENT IN AN ORGANIZED HEALTH CARE EDUCATION/TRAINING PROGRAM

## 2024-12-07 PROCEDURE — 2580000003 HC RX 258

## 2024-12-07 RX ORDER — FUROSEMIDE 20 MG/1
20 TABLET ORAL DAILY
Status: DISCONTINUED | OUTPATIENT
Start: 2024-12-07 | End: 2024-12-07 | Stop reason: HOSPADM

## 2024-12-07 RX ORDER — FUROSEMIDE 40 MG/1
40 TABLET ORAL DAILY
Qty: 30 TABLET | Refills: 0 | Status: SHIPPED | OUTPATIENT
Start: 2024-12-07 | End: 2024-12-13

## 2024-12-07 RX ORDER — ADENOSINE 3 MG/ML
INJECTION, SOLUTION INTRAVENOUS
Status: DISCONTINUED
Start: 2024-12-07 | End: 2024-12-07 | Stop reason: HOSPADM

## 2024-12-07 RX ADMIN — POTASSIUM BICARBONATE 40 MEQ: 391 TABLET, EFFERVESCENT ORAL at 06:23

## 2024-12-07 RX ADMIN — CARVEDILOL 3.12 MG: 3.12 TABLET, FILM COATED ORAL at 07:57

## 2024-12-07 RX ADMIN — SODIUM CHLORIDE, PRESERVATIVE FREE 10 ML: 5 INJECTION INTRAVENOUS at 07:57

## 2024-12-07 RX ADMIN — FUROSEMIDE 20 MG: 20 TABLET ORAL at 07:57

## 2024-12-07 RX ADMIN — EMPAGLIFLOZIN 10 MG: 10 TABLET, FILM COATED ORAL at 07:57

## 2024-12-07 RX ADMIN — ASPIRIN 81 MG: 81 TABLET, COATED ORAL at 07:57

## 2024-12-07 RX ADMIN — PYRIDOSTIGMINE BROMIDE 60 MG: 60 TABLET ORAL at 10:13

## 2024-12-07 RX ADMIN — LEVOTHYROXINE SODIUM 50 MCG: 0.05 TABLET ORAL at 06:07

## 2024-12-07 NOTE — DISCHARGE INSTRUCTIONS
HOME DISCHARGE INSTRUCTIONS    Stanley Sandy / [id number] : 1940    Admission date: 2024 Discharge date: 2024 9:48 AM     Please bring this form with you to show your care provider at your follow-up appointment.    Primary care provider:  Abdiel Joel      Discharging provider:  Mirella Villegas MD  - Family Medicine Resident  Aleja Arias MD  - Family Medicine Attending      You have been admitted to the hospital with the following diagnoses:    ACUTE DIAGNOSES:  Acute on chronic systolic congestive heart failure (HCC) [I50.23]  Acute decompensated heart failure (HCC) [I50.9]  Acute on chronic congestive heart failure, unspecified heart failure type (HCC) [I50.9]    You were admitted to the hospital with an acute exacerbation of heart failure. An echocardiogram was performed which showed your heart's ejection fraction decreased to 25%, from 32% on the prior (normal is 65%). You were given water pills to decrease your blood volume to reduce the burden on your heart and you improved. You were seen by cardiology who recommended you take additional medications at home to reduce heart strain.  . . . . . . . . . . . . . . . . . . . . . . . . . . . . . . . . . . . . . . . . . . . . . . . . . . . . . . . . . . . . . . . . . . . . . .      MEDICATION CHANGES  -Start Lasix 40mg daily  -Stop Mavik and start Entresto 24/26mg twice daily  -Start Jardiance 10mg daily    . . . . . . . . . . . . . . . . . . . . . . . . . . . . . . . . . . . .. . . . . . . . . . . . . . . . . . . . . . . . . . . . . . . . . .     FOLLOW-UP CARE RECOMMENDATIONS:    Appointments  Future Appointments   Date Time Provider Department Center   2024  9:40 AM Ray Mosher MD [facility] BS AMB   2025 11:00 AM [facility] ECHO 1 [facility] BS AMB   2025 11:40 AM Ray Mosher MD [facility] BS AMB   2025 11:20 AM Mikey Carney MD [facility]US[facility] BS Ray Mustafa MD  [address] [address]      Information obtained by:     I understand that if any problems occur once I am at home I am to contact my physician.    These instructions were explained to me and I had the opportunity to ask questions.    I understand and acknowledge receipt of the instructions indicated above.                                                                                                                                               Physician's or R.N.'s Signature                                                                  Date/Time                                                                                                                                              Patient or Representative Signature                                                          Date/Time

## 2024-12-07 NOTE — PROGRESS NOTES
[address] [place] [address]   Office [phone]  Fax [phone]          Subjective / Objective     Subjective  Overnight Events:   Patient seen and examined at bedside. Reports feeling well this AM. Has been up and in chair since admission. Continues to endorse HERRERA but states improved from prior. Has had large UOP overnight. Denies CP, N/V, dysuria.    Respiratory:   RA  Vitals:    12/07/24 0315   BP: 116/65   Pulse: 59   Resp: 16   Temp: 97.3 °F (36.3 °C)   SpO2: 95%     Physical Examination:   General appearance - alert, well appearing, and in no distress  Chest - +Bibasilar crackles, no wheezes, rales or rhonchi, symmetric air entry  Heart - normal rate, regular rhythm, normal S1, S2, no murmurs, rubs, clicks or gallops,   Abdomen - soft, nontender, mildly distended, no masses or organomegaly  Neurological - alert, oriented, normal speech, no focal findings  Skin - warm, dry. No notable rashes  Extremities - peripheral pulses normal, 2+ pedal edema however improved from prior, no clubbing or cyanosis  Psychiatric - normal speech and thought processes    I/O:  12/06 0701 - 12/07 0700  In: 220 [P.O.:220]  Out: 2600 [Urine:2600]  Inpatient Medications  [email or web address]  Current Facility-Administered Medications   Medication Dose Route Frequency    adenosine (ADENOCARD) 6 MG/2ML injection        furosemide (LASIX) tablet 20 mg  20 mg Oral Daily    sodium chloride flush 0.9 % injection 5-40 mL  5-40 mL IntraVENous 2 times per day    sodium chloride flush 0.9 % injection 5-40 mL  5-40 mL IntraVENous PRN    0.9 % sodium chloride infusion   IntraVENous PRN    enoxaparin (LOVENOX) injection 40 mg  40 mg SubCUTAneous Q24H    polyethylene glycol (GLYCOLAX) packet 17 g  17 g Oral Daily PRN    acetaminophen (TYLENOL) tablet 650 mg  650 mg Oral Q6H PRN    Or    acetaminophen (TYLENOL) suppository 650 mg  650 mg Rectal Q6H PRN    aspirin EC tablet 81 mg  81 mg Oral Daily    carvedilol (COREG) tablet 3.125

## 2024-12-07 NOTE — DISCHARGE SUMMARY
[address] [id number]   Office [phone]  Fax [phone]       Discharge / Transfer / Off-Service Note     Name: Stanley Sandy MRN: [id number]  Sex: Male   YOB: 1940  Age: 84 y.o.  PCP: Abdiel Joel MD     Date of admission: 12/6/2024  Date of discharge/transfer: 12/7/2024    Attending physician at admission: Aleja Arias MD.  Attending physician at discharge/transfer: Aleja Arias MD.  Resident physician at discharge/transfer: Nagi Holland MD     Consultants during hospitalization  IP CONSULT TO FAMILY MEDICINE  IP CONSULT TO CARDIOLOGY     Admission diagnoses   Acute on chronic systolic congestive heart failure (HCC) [I50.23]  Acute decompensated heart failure (HCC) [I50.9]  Acute on chronic congestive heart failure, unspecified heart failure type (HCC) [I50.9]    Recommended follow-up after discharge    1. PCP-Abdiel Joel MD  2. Cardiology- Dr. Damon     To follow up on with PCP:   - Blood pressure  - Potassium  ------------------------------------------------------------------------------------------------------------------    History of Present Illness    As per admitting provider:   \"Stanley Sandy is a 84 y.o. male with known history of myasthenia gravis, prostate cancer (in remission), pancreatic IPMN, ischemic cardiomyopathy, HTN who presents to the ER complaining of exertional dyspnea.     Patient reports that over the last 3 days, he has noticed getting short of breath walking the 60 yards to his workshop.  Patient has never had the symptoms before.  He also notices that his legs have been swelling over this time.  He has never had these symptoms before, but has been told to watch out for leg swelling by his cardiologist. He follows with Dr. Damon for ischemic cardiomyopathy and for the past ~10 years.  He states his ejection fraction is 30 to 35% and he is due for an echocardiogram in 25 days, however he does not take a diuretic at

## 2024-12-07 NOTE — PROGRESS NOTES
[facility] CARDIOLOGY                    Cardiology Care Note     []Initial Encounter     [x]Follow-up    Patient Name: Stanley Sandy - :1940 - MRN:[id number]  Primary Cardiologist: Ray Mosher MD  Consulting Cardiologist: Abdon Hannah DO     Reason for encounter: CHF    HPI:       Stanley Sandy is a 84 y.o. male with PMH significant for HFrEF, AS, ICM, CAD, HLD, PVC's, DM, hypothyroidism, myasthenia gravis and prostate CA.     Pt presented to the ED with complaints of worsening SOB, more noticeable with exertion. Associated with increased lower ext edema and some abdominal bloating. Denies any CP. He was not on a diuretic at home. On admission, noted to have elevated pBNP to > 5000.     Subjective:      Doing fine - ready to go home      Assessment and Plan     Acute on chronic HFrEF, ICM   - known EF of 30-35% for last few years  - has declined ICD in the past  - Echo 24 - LVEF 25%, grade 2 diastology, Mod dilated RV with severely reduced function. Mild-mod AS  - Had IV diuresis 24 ---> was not on diuretic at home, would d/c on PO lasix   - cont coreg, losartan   - As OP - could consider stopping ARB in favor of starting Entresto (BP is a little low today)     2. Mild-mod AS   - Follow OP     3. CAD   - prior RCA infarct on MRI, cath  showed mild plaque   - cont ASA, statin     4. PVC's, RBBB  - chronic     Home today is OK  --- follow up with Dr. Mosher        ____________________________________________________________    Cardiac testing    Echo 24 - LVEF 25%, grade 2 diastology, Mod dilated RV with severely reduced function.  Aortic Valve: Mildly calcified cusps. Mild to moderate stenosis of the aortic valve. AV mean gradient is 15 mmHg. AV area by continuity VTI is 1.5 cm2. RVSP 41 mmHg.  JOHNNY           TRANSTHORACIC ECHOCARDIOGRAM (TTE) COMPLETE (CONTRAST/BUBBLE/3D PRN) 2022  2:01 PM, 2022 12:00 AM (Final)    Narrative  This is a summary report. The  (TYLENOL) tablet 650 mg, 650 mg, Oral, Q6H PRN **OR** acetaminophen (TYLENOL) suppository 650 mg, 650 mg, Rectal, Q6H PRN, Tricia Gutiérrez MD    aspirin EC tablet 81 mg, 81 mg, Oral, Daily, Tricia Gutiérrez MD, 81 mg at 12/07/24 0757    carvedilol (COREG) tablet 3.125 mg, 3.125 mg, Oral, BID WC, Tricia Gutiérrez MD, 3.125 mg at 12/07/24 0757    levothyroxine (SYNTHROID) tablet 50 mcg, 50 mcg, Oral, Daily, Tricia Gutiérrez MD, 50 mcg at 12/07/24 0607    pyRIDostigmine (MESTINON) tablet 60 mg, 60 mg, Oral, BID, Tricia Gutiérrez MD, 60 mg at 12/06/24 2211    empagliflozin (JARDIANCE) tablet 10 mg, 10 mg, Oral, Daily, Abdon Hannah DO, 10 mg at 12/07/24 0757    losartan (COZAAR) tablet 12.5 mg, 12.5 mg, Oral, Q24H, Abdon Hannah DO    melatonin tablet 6 mg, 6 mg, Oral, Nightly, Maryam Varela DO, 6 mg at 12/06/24 2211    Gela Alejandre MD    [facility] Card[facility]  Call center: (P) [phone]  (F) [phone]      CC:Abdiel Joel MD

## 2024-12-07 NOTE — PLAN OF CARE
PHYSICAL THERAPY EVALUATION/DISCHARGE    Patient: Stanley Sandy (84 y.o. male)  Date: 12/7/2024  Primary Diagnosis: Acute on chronic systolic congestive heart failure (HCC) [I50.23]  Acute decompensated heart failure (HCC) [I50.9]  Acute on chronic congestive heart failure, unspecified heart failure type (HCC) [I50.9]       Precautions:                        ASSESSMENT AND RECOMMENDATIONS:  Based on the objective data below, the patient is functioning near his functional baseline. He has been up and to the restroom and chair independently without issue. Patient was independent with all mobility and displayed safe ambulation without LOB or SOB complaint. O2 remained stable at 94% or greater and no signs or symptoms of orthostatic hypotension. Patient without mobility concerns warranting further skilled PT, and patient endorses no mobility concerns at this time. Daughter entering room upon session completion. He is clear for DC from a PT perspective. Please place new orders if there is a change in status.     Functional Outcome Measure:  The patient scored 22/24 on the [facility] outcome measure which is indicative of reduced need for continued skilled PT intervention at [place].          Further skilled acute physical therapy is not indicated at this time.       PLAN :  Recommendation for discharge: (in order for the patient to meet his/her long term goals):   No skilled physical therapy    Other factors to consider for discharge: no additional factors    IF patient discharges home will need the following DME: none       SUBJECTIVE:   Patient stated “I know I am better, because I would not have been able to talk to you while walking when it was bad.”    OBJECTIVE DATA SUMMARY:     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    Prostate cancer (HCC)     Bernice Christianson, watch and wait  strategy    RMSF (Ben Mountain spotted fever)     T2DM (type 2 diabetes mellitus) ([facility]) 8/21/2013     Past Surgical History:   Procedure Laterality Date    HERNIA REPAIR      left inguinal x 2    ND UNLISTED PROCEDURE CARDIAC SURGERY         Home Situation and Prior Level of Function:   Social/Functional History  Lives With: Alone  Type of Home: House  Home Layout: One level  Home Access: Ramped entrance  Bathroom Shower/Tub: Walk-in shower  Bathroom Toilet: Standard  Bathroom Equipment: Shower chair, Grab bars in shower  Bathroom Accessibility: Accessible, Walker accessible  Home Equipment: Cane  Has the patient had two or more falls in the past year or any fall with injury in the past year?: No  Receives Help From: Family  Prior Level of Assist for ADLs: Independent  Prior Level of Assist for Homemaking: Independent  Homemaking Responsibilities: Yes  Prior Level of Assist for Ambulation: Independent household ambulator, with or without device, Independent community ambulator, with or without device  Prior Level of Assist for Transfers: Independent  Active : Yes  Critical Behavior:  Orientation  Overall Orientation Status: Within Normal Limits  Orientation Level: Oriented X4  Cognition  Overall Cognitive Status: WNL    Strength:    Strength: Within functional limits    Tone & Sensation:   Tone: Normal  Sensation: Intact    Coordination:  Coordination: Within functional limits    Range Of Motion:  AROM: Within functional limits  PROM: Within functional limits    Functional Mobility:  Bed Mobility:     Bed Mobility Training  Bed Mobility Training: Yes  Overall Level of Assistance: Independent  Rolling: Independent  Supine to Sit: Independent  Sit to Supine: Independent  Scooting: Independent  Transfers:     Transfer Training  Transfer Training: Yes  Overall Level of Assistance: Independent  Balance:               Balance  Sitting: Intact  Standing: Intact  Ambulation/Gait Training:           Gait  Gait  Post-Acute Care \"6-Clicks\" Basic Mobility Scores Predict Discharge Destination After Acute Care Hospitalization in Select Patient Groups: A Retrospective, Observational Study. Arch Rehabil Res Clin Transl. 2022;4(3):148921. doi: 10.1016/j.arrct..692988. PMID: 14834104; PMCID: MYI8755101.  4. Mary Jane SUNG, Whit S, Katina W, Briaan NEW. AM-PAC Short Forms Manual 4.0. Revised 2020.                                                                                                                                                                                                                              Pain Ratin/10   Pain Intervention(s):   pain is at a level acceptable to the patient    Activity Tolerance:   Good    After treatment:   Patient left in no apparent distress in bed, Call bell within reach, Caregiver / family present, and Side rails x3      COMMUNICATION/EDUCATION:   The patient's plan of care was discussed with: registered nurse    Patient Education  Education Given To: Patient;Family  Education Provided: Role of Therapy;Plan of Care;ADL Adaptive Strategies    Thank you for this referral.  Case Aguirre, PT  Minutes: 20      Physical Therapy Evaluation Charge Determination   History Examination Presentation Decision-Making   LOW Complexity : Zero comorbidities / personal factors that will impact the outcome / POC LOW Complexity : 1-2 Standardized tests and measures addressing body structure, function, activity limitation and / or participation in recreation  LOW Complexity : Stable, uncomplicated  AM-PAC  LOW      Based on the above components, the patient evaluation is determined to be of the following complexity level: Low

## 2024-12-08 LAB
EKG ATRIAL RATE: 64 BPM
EKG DIAGNOSIS: NORMAL
EKG P AXIS: 49 DEGREES
EKG P-R INTERVAL: 290 MS
EKG Q-T INTERVAL: 482 MS
EKG QRS DURATION: 148 MS
EKG QTC CALCULATION (BAZETT): 497 MS
EKG R AXIS: 260 DEGREES
EKG T AXIS: 68 DEGREES
EKG VENTRICULAR RATE: 64 BPM

## 2024-12-08 PROCEDURE — 93010 ELECTROCARDIOGRAM REPORT: CPT | Performed by: SPECIALIST

## 2024-12-09 ENCOUNTER — TELEPHONE (OUTPATIENT)
Facility: CLINIC | Age: 84
End: 2024-12-09

## 2024-12-13 ENCOUNTER — OFFICE VISIT (OUTPATIENT)
Age: 84
End: 2024-12-13
Payer: MEDICARE

## 2024-12-13 ENCOUNTER — TELEPHONE (OUTPATIENT)
Age: 84
End: 2024-12-13

## 2024-12-13 VITALS
OXYGEN SATURATION: 97 % | HEIGHT: 66 IN | BODY MASS INDEX: 26.36 KG/M2 | HEART RATE: 76 BPM | WEIGHT: 164 LBS | SYSTOLIC BLOOD PRESSURE: 100 MMHG | DIASTOLIC BLOOD PRESSURE: 60 MMHG

## 2024-12-13 DIAGNOSIS — I25.10 CORONARY ARTERY DISEASE INVOLVING NATIVE CORONARY ARTERY OF NATIVE HEART WITHOUT ANGINA PECTORIS: ICD-10-CM

## 2024-12-13 DIAGNOSIS — I25.10 HEART FAILURE, SYSTOLIC, DUE TO CAD (HCC): ICD-10-CM

## 2024-12-13 DIAGNOSIS — I50.22 CHRONIC SYSTOLIC CONGESTIVE HEART FAILURE (HCC): Primary | ICD-10-CM

## 2024-12-13 DIAGNOSIS — Z78.9 STATIN INTOLERANCE: ICD-10-CM

## 2024-12-13 DIAGNOSIS — I25.10 CORONARY ARTERY DISEASE, UNSPECIFIED VESSEL OR LESION TYPE, UNSPECIFIED WHETHER ANGINA PRESENT, UNSPECIFIED WHETHER NATIVE OR TRANSPLANTED HEART: Primary | ICD-10-CM

## 2024-12-13 DIAGNOSIS — E78.00 HYPERCHOLESTEREMIA: ICD-10-CM

## 2024-12-13 DIAGNOSIS — I49.3 ASYMPTOMATIC PVCS: ICD-10-CM

## 2024-12-13 DIAGNOSIS — I50.20 HEART FAILURE, SYSTOLIC, DUE TO CAD (HCC): ICD-10-CM

## 2024-12-13 DIAGNOSIS — I35.0 NONRHEUMATIC AORTIC VALVE STENOSIS: ICD-10-CM

## 2024-12-13 DIAGNOSIS — I25.5 ISCHEMIC CARDIOMYOPATHY: ICD-10-CM

## 2024-12-13 PROBLEM — I50.9 HEART FAILURE, UNSPECIFIED (HCC): Status: ACTIVE | Noted: 2024-12-13

## 2024-12-13 PROCEDURE — 99214 OFFICE O/P EST MOD 30 MIN: CPT | Performed by: SPECIALIST

## 2024-12-13 RX ORDER — LANOLIN ALCOHOL/MO/W.PET/CERES
1000 CREAM (GRAM) TOPICAL DAILY
COMMUNITY

## 2024-12-13 RX ORDER — FUROSEMIDE 40 MG/1
40 TABLET ORAL
Qty: 48 TABLET | Refills: 3 | Status: SHIPPED | OUTPATIENT
Start: 2024-12-14 | End: 2025-01-07 | Stop reason: SDUPTHER

## 2024-12-13 NOTE — PATIENT INSTRUCTIONS
Reduce the furosemide ( Lasix) to 4 days a week like every other day  Return to February [facility]  We will schedule a left and right heart cath with coronary angiogram   Call with any changes in your breathing

## 2024-12-13 NOTE — TELEPHONE ENCOUNTER
Patient daughter ( Esther ) called in requesting to please have an call to go over todays appointment .      Patient daughter # ~ [phone]

## 2024-12-13 NOTE — PROGRESS NOTES
Stanley Sandy     1940       Ray Mosher MD, [facility]  Date of Visit-12/13/2024   PCP is Abdiel Joel MD   [facility] [place]  [facility]  HPI:  Stanley aSndy is a 84 y.o. male   Seen 8/14, dtr called 12/6 indicating more SOB x 3 d  Sent to ER and admit overnight with CHF    CHF, RCA MI, XOL, PVCs    Today..  He says he got more short of breath his stomach was swelling and he had trouble getting out of his truck.  Lasted for about 3 days till he called.  He went in on a Friday to the hospital was discharged the next day.  He says the Lasix makes him urinate a lot he did not take it yesterday or today.  Weight is down 14 pounds  He is tolerating the Jardiance and Entresto without difficulty.  EF 25% at hospital, RV down, mild to mod AS  He gets dizzy sometimes in the morning with the meds  His sob is much improved though not normal  Mild mod HERRERA  He has no chest pain or palps  Edema is mild per pt        Adjust OMT meds  A-Mavik change to Entresto Tier 1  B-Coreg 3 bid  S-Added Jardiance  D- On lasix  NT Pro-BNP (PG/ML)   Date Value   12/06/2024 5,124 (H)       Lab Results   Component Value Date    CREATININE 1.00 12/07/2024     Lab Results   Component Value Date    K 3.4 (L) 12/07/2024        Reduced EF, CHF with Ischemic CMY---Echo 12/16/22  EF 32%  DD, NCCMY, sclerosis AV with mild AS , mild MR, JOHNNY  MRI 2014 showed RCA infarct.  He had dense subendocardial infarct in the basal and mid inferolateral and lateral walls and MRI EF was 42%;  he has declined ICD.  Echo 2013 showed EF 30-35% global hypokinesia stress echo showed poor exercise capacity in a cath 9/24/2013 showed mild plaque with an EDP of 16 and no AV gradient  echo in December 2020 showed EF 30-35%. Mild septal wall hypertrophy.Mild posterior wall hypertrophy.RV: Mildly reduced systolic function.  · AO: Mild ascending aorta dilatation. Ascending aorta diameter = 4.3 cm.  · AV: Moderate aortic

## 2024-12-13 NOTE — PROGRESS NOTES
Chief Complaint   Patient presents with    Follow-up     Vitals:    12/13/24 0920   BP: 100/60   Site: Left Upper Arm   Position: Sitting   Cuff Size: Medium Adult   Pulse: 76   SpO2: 97%   Weight: 74.4 kg (164 lb)   Height: 1.676 m (5' 6\")      /60 (Site: Left Upper Arm, Position: Sitting, Cuff Size: Medium Adult)   Pulse 76   Ht 1.676 m (5' 6\")   Wt 74.4 kg (164 lb)   SpO2 97%   BMI 26.47 kg/m²

## 2024-12-13 NOTE — TELEPHONE ENCOUNTER
Spoke with Pt regarding /[facility] schd. For 12/17/2024 At 9:15 AM arrive at 7:45 AM. Pt aware that they need a  they must stay on site NPO from Midnight the night before. You can have clear liquids up to 2 hours prior to procedure. Please drink 10 8oz glasses of water 3 days prior and 3 days after Cath. Check in at the second floor Outpt. Reg. Desk. Pt had Labs done on 12/7/2024.     Medications:  Hold jardiane the day of procedure   Hold Lasix the day of procedure      VO by /nurse: Fransico

## 2024-12-16 ENCOUNTER — TELEPHONE (OUTPATIENT)
Facility: CLINIC | Age: 84
End: 2024-12-16

## 2024-12-16 ENCOUNTER — TELEPHONE (OUTPATIENT)
Age: 84
End: 2024-12-16

## 2024-12-16 ENCOUNTER — OFFICE VISIT (OUTPATIENT)
Facility: CLINIC | Age: 84
End: 2024-12-16
Payer: MEDICARE

## 2024-12-16 ENCOUNTER — DIRECT ADMIT ORDERS (OUTPATIENT)
Facility: HOSPITAL | Age: 84
End: 2024-12-16

## 2024-12-16 VITALS
HEART RATE: 89 BPM | HEIGHT: 66 IN | OXYGEN SATURATION: 100 % | DIASTOLIC BLOOD PRESSURE: 55 MMHG | RESPIRATION RATE: 20 BRPM | SYSTOLIC BLOOD PRESSURE: 99 MMHG | BODY MASS INDEX: 26.68 KG/M2 | TEMPERATURE: 98.4 F | WEIGHT: 166 LBS

## 2024-12-16 DIAGNOSIS — R05.1 ACUTE COUGH: ICD-10-CM

## 2024-12-16 DIAGNOSIS — I50.9 HEART FAILURE, UNSPECIFIED (HCC): ICD-10-CM

## 2024-12-16 DIAGNOSIS — I50.20 HFREF (HEART FAILURE WITH REDUCED EJECTION FRACTION) (HCC): ICD-10-CM

## 2024-12-16 DIAGNOSIS — I25.10 CAD (CORONARY ATHEROSCLEROTIC DISEASE): Primary | ICD-10-CM

## 2024-12-16 DIAGNOSIS — E87.6 HYPOKALEMIA: ICD-10-CM

## 2024-12-16 DIAGNOSIS — J06.9 VIRAL URI: Primary | ICD-10-CM

## 2024-12-16 PROCEDURE — G8417 CALC BMI ABV UP PARAM F/U: HCPCS | Performed by: STUDENT IN AN ORGANIZED HEALTH CARE EDUCATION/TRAINING PROGRAM

## 2024-12-16 PROCEDURE — 1159F MED LIST DOCD IN RCRD: CPT | Performed by: STUDENT IN AN ORGANIZED HEALTH CARE EDUCATION/TRAINING PROGRAM

## 2024-12-16 PROCEDURE — G8482 FLU IMMUNIZE ORDER/ADMIN: HCPCS | Performed by: STUDENT IN AN ORGANIZED HEALTH CARE EDUCATION/TRAINING PROGRAM

## 2024-12-16 PROCEDURE — 1111F DSCHRG MED/CURRENT MED MERGE: CPT | Performed by: STUDENT IN AN ORGANIZED HEALTH CARE EDUCATION/TRAINING PROGRAM

## 2024-12-16 PROCEDURE — 1123F ACP DISCUSS/DSCN MKR DOCD: CPT | Performed by: STUDENT IN AN ORGANIZED HEALTH CARE EDUCATION/TRAINING PROGRAM

## 2024-12-16 PROCEDURE — 99214 OFFICE O/P EST MOD 30 MIN: CPT | Performed by: STUDENT IN AN ORGANIZED HEALTH CARE EDUCATION/TRAINING PROGRAM

## 2024-12-16 PROCEDURE — 1160F RVW MEDS BY RX/DR IN RCRD: CPT | Performed by: STUDENT IN AN ORGANIZED HEALTH CARE EDUCATION/TRAINING PROGRAM

## 2024-12-16 PROCEDURE — 1036F TOBACCO NON-USER: CPT | Performed by: STUDENT IN AN ORGANIZED HEALTH CARE EDUCATION/TRAINING PROGRAM

## 2024-12-16 PROCEDURE — 1126F AMNT PAIN NOTED NONE PRSNT: CPT | Performed by: STUDENT IN AN ORGANIZED HEALTH CARE EDUCATION/TRAINING PROGRAM

## 2024-12-16 PROCEDURE — G8427 DOCREV CUR MEDS BY ELIG CLIN: HCPCS | Performed by: STUDENT IN AN ORGANIZED HEALTH CARE EDUCATION/TRAINING PROGRAM

## 2024-12-16 RX ORDER — SODIUM CHLORIDE 0.9 % (FLUSH) 0.9 %
5-40 SYRINGE (ML) INJECTION EVERY 12 HOURS SCHEDULED
OUTPATIENT
Start: 2024-12-17 | End: 2024-12-17

## 2024-12-16 RX ORDER — SODIUM CHLORIDE 9 MG/ML
INJECTION, SOLUTION INTRAVENOUS CONTINUOUS
OUTPATIENT
Start: 2024-12-17

## 2024-12-16 RX ORDER — SODIUM CHLORIDE 0.9 % (FLUSH) 0.9 %
5-40 SYRINGE (ML) INJECTION PRN
OUTPATIENT
Start: 2024-12-17

## 2024-12-16 RX ORDER — BENZONATATE 200 MG/1
200 CAPSULE ORAL 3 TIMES DAILY PRN
Qty: 30 CAPSULE | Refills: 0 | Status: SHIPPED | OUTPATIENT
Start: 2024-12-16 | End: 2024-12-19

## 2024-12-16 RX ORDER — SODIUM CHLORIDE 9 MG/ML
INJECTION, SOLUTION INTRAVENOUS PRN
OUTPATIENT
Start: 2024-12-17

## 2024-12-16 ASSESSMENT — ENCOUNTER SYMPTOMS
ABDOMINAL PAIN: 0
RHINORRHEA: 1
SHORTNESS OF BREATH: 0
SINUS PAIN: 0
WHEEZING: 0
SORE THROAT: 0
SINUS PRESSURE: 0
COUGH: 1

## 2024-12-16 NOTE — TELEPHONE ENCOUNTER
Attempted to reach Esther by telephone. A message was left for return call.     Attempted to reach patient by telephone. A message was left for return call.

## 2024-12-16 NOTE — TELEPHONE ENCOUNTER
Patients daughter Mirela called and stated her dad has a cold and needs to rescheduled the surgery that is scheduled for tomorrow. She would like a call back ASAP.     Patient phone # [phone]

## 2024-12-16 NOTE — TELEPHONE ENCOUNTER
Patient called, no answer. Unable to leave message.    Please schedule for OV  today for cold sx.     Notify patient as well to call scheduling and notify of cold sx.

## 2024-12-16 NOTE — TELEPHONE ENCOUNTER
Spoke with patients daughter Esther and rescheduled procedure to 1/7/2025 at 9:15 AM arrival 7:45 AM.

## 2024-12-16 NOTE — TELEPHONE ENCOUNTER
Pt called stating he came down with cold sx over the weekend. Pt is scheduled for catheterization on tomorrow but pt feels due to his sx he should r/s. Pt is requesting a call back.    Please advise...

## 2024-12-16 NOTE — PROGRESS NOTES
Reviewed record in preparation for visit and have necessary documentation  Pt did not bring medication to office visit for review  opportunity was given for questions  \"Have you been to the ER, urgent care clinic since your last visit?  Hospitalized since your last visit?\"    NO    “Have you seen or consulted any other health care providers outside of [facility] since your last visit?”    NO      Click Here for Release of Records Request     Goals that were addressed and/or need to be completed during or after this appointment include   Health Maintenance Due   Topic Date Due    Prostate Specific Antigen (PSA) Screening or Monitoring  Never done    Diabetic foot exam  03/22/2022    Diabetic Alb to Cr ratio (uACR) test  03/01/2023      
Practice  12/16/24

## 2024-12-17 LAB
ANION GAP SERPL CALC-SCNC: 3 MMOL/L (ref 2–12)
BUN SERPL-MCNC: 13 MG/DL (ref 6–20)
BUN/CREAT SERPL: 13 (ref 12–20)
CALCIUM SERPL-MCNC: 9 MG/DL (ref 8.5–10.1)
CHLORIDE SERPL-SCNC: 104 MMOL/L (ref 97–108)
CO2 SERPL-SCNC: 31 MMOL/L (ref 21–32)
CREAT SERPL-MCNC: 1.02 MG/DL (ref 0.7–1.3)
GLUCOSE SERPL-MCNC: 98 MG/DL (ref 65–100)
POTASSIUM SERPL-SCNC: 4.2 MMOL/L (ref 3.5–5.1)
SODIUM SERPL-SCNC: 138 MMOL/L (ref 136–145)

## 2024-12-19 ENCOUNTER — OFFICE VISIT (OUTPATIENT)
Facility: CLINIC | Age: 84
End: 2024-12-19

## 2024-12-19 VITALS
RESPIRATION RATE: 20 BRPM | SYSTOLIC BLOOD PRESSURE: 94 MMHG | HEIGHT: 66 IN | WEIGHT: 163 LBS | DIASTOLIC BLOOD PRESSURE: 57 MMHG | BODY MASS INDEX: 26.2 KG/M2 | HEART RATE: 99 BPM | TEMPERATURE: 98.2 F | OXYGEN SATURATION: 100 %

## 2024-12-19 DIAGNOSIS — I50.20 HFREF (HEART FAILURE WITH REDUCED EJECTION FRACTION) (HCC): ICD-10-CM

## 2024-12-19 DIAGNOSIS — E11.9 DIET-CONTROLLED DIABETES MELLITUS (HCC): ICD-10-CM

## 2024-12-19 DIAGNOSIS — Z09 HOSPITAL DISCHARGE FOLLOW-UP: ICD-10-CM

## 2024-12-19 DIAGNOSIS — R05.1 ACUTE COUGH: Primary | ICD-10-CM

## 2024-12-19 PROBLEM — D69.6 THROMBOCYTOPENIA, UNSPECIFIED (HCC): Status: ACTIVE | Noted: 2024-12-19

## 2024-12-19 RX ORDER — BENZONATATE 100 MG/1
CAPSULE ORAL
COMMUNITY
Start: 2024-12-16

## 2024-12-19 NOTE — PROGRESS NOTES
Reviewed record in preparation for visit and have necessary documentation  Pt did not bring medication to office visit for review  opportunity was given for questions  \"Have you been to the ER, urgent care clinic since your last visit?  Hospitalized since your last visit?\"    NO    “Have you seen or consulted any other health care providers outside of [facility] since your last visit?”    NO      Click Here for Release of Records Request     Goals that were addressed and/or need to be completed during or after this appointment include   Health Maintenance Due   Topic Date Due    Prostate Specific Antigen (PSA) Screening or Monitoring  Never done    Diabetic foot exam  03/22/2022    Diabetic Alb to Cr ratio (uACR) test  03/01/2023

## 2024-12-25 NOTE — PROGRESS NOTES
Progress Note    Patient: Stanley Sandy MRN: [id number]  SSN: [id number]    YOB: 1940  Age: 84 y.o.  Sex: male        Chief Complaint   Patient presents with    Follow-Up from Hospital    Cold Symptoms     cough     he is a 84 y.o. year old male who presents for DANIEL. Patient admitted to [facility] on 12/6/24 with d/c on 12/7/24 with dx of acute on chronic CHF. Hospital notes, lab results reviewed. Medications reconciled. Patient with diagnoses of T2D, hypothyroidism, HLD and CHF. He is followed by neurology for MG. He complains of cough. Patient denies HA, dizziness, SOB, CP, abdominal pain, dysuria, acute myalgias or arthralgias.     Encounter Diagnoses   Name Primary?    Acute cough Yes    HFrEF (heart failure with reduced ejection fraction) (HCC)     Diet-controlled diabetes mellitus (HCC)     Hospital discharge follow-up      BP Readings from Last 3 Encounters:   12/19/24 (!) 94/57   12/16/24 (!) 99/55   12/13/24 100/60     Wt Readings from Last 3 Encounters:   12/19/24 73.9 kg (163 lb)   12/16/24 75.3 kg (166 lb)   12/13/24 74.4 kg (164 lb)     Body mass index is 26.31 kg/m².    CMP:    Lab Results   Component Value Date/Time     12/16/2024 01:35 PM    K 4.2 12/16/2024 01:35 PM     12/16/2024 01:35 PM    CO2 31 12/16/2024 01:35 PM    BUN 13 12/16/2024 01:35 PM    CREATININE 1.02 12/16/2024 01:35 PM    GFRAA >60 03/01/2022 08:55 AM    AGRATIO 1.4 02/02/2023 10:45 AM    LABGLOM 73 12/16/2024 01:35 PM    LABGLOM 73 04/10/2024 09:15 AM    LABGLOM >60 02/02/2023 10:45 AM    GLUCOSE 98 12/16/2024 01:35 PM    CALCIUM 9.0 12/16/2024 01:35 PM    BILITOT 2.2 12/06/2024 11:39 AM    ALKPHOS 78 12/06/2024 11:39 AM    ALKPHOS 62 02/02/2023 10:45 AM    AST 41 12/06/2024 11:39 AM    ALT 38 12/06/2024 11:39 AM     HgBA1c:    Lab Results   Component Value Date/Time    LABA1C 5.7 09/04/2024 09:10 AM     Lab Results   Component Value Date    CHOL 154 09/04/2024    TRIG 70 09/04/2024    HDL 43 09/04/2024    LDL

## 2024-12-30 DIAGNOSIS — E11.9 DIET-CONTROLLED DIABETES MELLITUS (HCC): ICD-10-CM

## 2024-12-30 DIAGNOSIS — E78.00 PURE HYPERCHOLESTEROLEMIA, UNSPECIFIED: ICD-10-CM

## 2024-12-31 LAB
ALBUMIN SERPL-MCNC: 3.4 G/DL (ref 3.5–5)
ALBUMIN/GLOB SERPL: 1 (ref 1.1–2.2)
ALP SERPL-CCNC: 158 U/L (ref 45–117)
ALT SERPL-CCNC: 60 U/L (ref 12–78)
ANION GAP SERPL CALC-SCNC: 4 MMOL/L (ref 2–12)
AST SERPL-CCNC: 40 U/L (ref 15–37)
BILIRUB SERPL-MCNC: 1.2 MG/DL (ref 0.2–1)
BUN SERPL-MCNC: 9 MG/DL (ref 6–20)
BUN/CREAT SERPL: 9 (ref 12–20)
CALCIUM SERPL-MCNC: 8.8 MG/DL (ref 8.5–10.1)
CHLORIDE SERPL-SCNC: 102 MMOL/L (ref 97–108)
CHOLEST SERPL-MCNC: 168 MG/DL
CO2 SERPL-SCNC: 29 MMOL/L (ref 21–32)
CREAT SERPL-MCNC: 0.96 MG/DL (ref 0.7–1.3)
ERYTHROCYTE [DISTWIDTH] IN BLOOD BY AUTOMATED COUNT: 13.2 % (ref 11.5–14.5)
EST. AVERAGE GLUCOSE BLD GHB EST-MCNC: 131 MG/DL
GLOBULIN SER CALC-MCNC: 3.3 G/DL (ref 2–4)
GLUCOSE SERPL-MCNC: 111 MG/DL (ref 65–100)
HBA1C MFR BLD: 6.2 % (ref 4–5.6)
HCT VFR BLD AUTO: 49 % (ref 36.6–50.3)
HDLC SERPL-MCNC: 43 MG/DL
HDLC SERPL: 3.9 (ref 0–5)
HGB BLD-MCNC: 15.4 G/DL (ref 12.1–17)
LDLC SERPL CALC-MCNC: 108.2 MG/DL (ref 0–100)
MCH RBC QN AUTO: 32.8 PG (ref 26–34)
MCHC RBC AUTO-ENTMCNC: 31.4 G/DL (ref 30–36.5)
MCV RBC AUTO: 104.3 FL (ref 80–99)
NRBC # BLD: 0 K/UL (ref 0–0.01)
NRBC BLD-RTO: 0 PER 100 WBC
PLATELET # BLD AUTO: 322 K/UL (ref 150–400)
PMV BLD AUTO: 9.9 FL (ref 8.9–12.9)
POTASSIUM SERPL-SCNC: 4.5 MMOL/L (ref 3.5–5.1)
PROT SERPL-MCNC: 6.7 G/DL (ref 6.4–8.2)
RBC # BLD AUTO: 4.7 M/UL (ref 4.1–5.7)
SODIUM SERPL-SCNC: 135 MMOL/L (ref 136–145)
TRIGL SERPL-MCNC: 84 MG/DL
TSH SERPL DL<=0.05 MIU/L-ACNC: 1.21 UIU/ML (ref 0.36–3.74)
VLDLC SERPL CALC-MCNC: 16.8 MG/DL
WBC # BLD AUTO: 7.3 K/UL (ref 4.1–11.1)

## 2025-01-07 ENCOUNTER — TELEPHONE (OUTPATIENT)
Age: 85
End: 2025-01-07

## 2025-01-07 RX ORDER — FUROSEMIDE 40 MG/1
40 TABLET ORAL
Qty: 30 TABLET | Refills: 0 | Status: SHIPPED | OUTPATIENT
Start: 2025-01-07

## 2025-01-07 RX ORDER — FUROSEMIDE 40 MG/1
40 TABLET ORAL
Qty: 30 TABLET | Refills: 0 | Status: SHIPPED | OUTPATIENT
Start: 2025-01-07 | End: 2025-01-07 | Stop reason: CLARIF

## 2025-01-07 NOTE — TELEPHONE ENCOUNTER
Call placed to patient and daughter Esther (HIPAA confirmed).     Informed that Dr. Aviles is unable to complete Cardiac Cath this morning. Will call back to reschedule.     Patient requested medication refills. Will refill per Dr. Mosher for 30 days.     Patient expressed understanding.  Opportunities for questions, clarifications, and concerns provided.

## 2025-01-07 NOTE — TELEPHONE ENCOUNTER
Patient requested 30 day supply of medications until his Cardiac Cath can be reschedule. Requested to send to [id number] [address].     Jardiance 10 mg daily  Lasx 40 mg 4 times weekly  Entresto 24-26 mg twice daily

## 2025-01-07 NOTE — PROGRESS NOTES
Physician Progress Note      PATIENT:               KAMILAH SCHAEFER  [facility] #:                  [id number]  :                       1940  ADMIT DATE:       2024 11:21 AM  DISCH DATE:        2024 1:15 PM  RESPONDING  PROVIDER #:        Yesenia Welsh NP          QUERY TEXT:    Pt admitted with Acute on chronic systolic congestive heart failure. Pt noted   to also have Ischemic cardiomyopathy, CAD, Hypertension in H&P . If   possible, please document in progress notes and discharge summary the etiology   of CHF, if able to be determined.    The medical record reflects the following:  Risk Factors: CHF, HTN, Cardiomyopathy, CKD, 84-year-old  Clinical Indicators: H&P - Presents with 3 days exertional SOB. Admitted   for HFrEF exacerbation, I/s/o known h/o ischemic cardiomyopathy.  Exam notable   for b/l lung crackles, 2+ pitting edema. He does not take a diuretic at home.    Cardiology Consults -Acute on chronic systolic congestive heart failure.   Low flow/low gradient severe AS on echocardiogram .  Coronary artery   disease status post prior RCA infarct.    NTpro-BNP : - 5124.    2D ECO: EF 25%. Trace regurgitation. Mild to moderate stenosis of the aortic   valve.    Treatment:    Continue loop diuretic, continue carvedilol 3.125 mg twice   daily, telemetry monitoring, Consult Cardiology, 2D ECO, EKG, Radiology   imaging, Labs.    Thank you  Kendell JAIMES CDS.  Options provided:  -- CHF due to Hypertensive Heart Disease  -- CHF due to Hypertensive Heart Disease and CAD  -- CHF not due to Hypertension but due to CAD  -- CHF due to Hypertensive Heart Disease and ICMP  -- CHF not due to Hypertension but due to ICMP  -- CHF due to Hypertensive Heart Disease and Valvular Heart Disease  -- CHF not due to Hypertension but due to valvular heart disease  -- CHF due to HTN, CAD, ICMP and valvular disease  -- Other - I will add my own diagnosis  -- Disagree - Not applicable / Not valid  --

## 2025-01-22 ENCOUNTER — TELEPHONE (OUTPATIENT)
Facility: CLINIC | Age: 85
End: 2025-01-22

## 2025-01-22 DIAGNOSIS — R21 RASH OF GROIN: Primary | ICD-10-CM

## 2025-01-22 NOTE — TELEPHONE ENCOUNTER
Pt called inquiring if  could call him in a rx  for a yeast infection, pt states he noticed the following sx on yesterday:    Redness, itching on/around the genital area, pt states he recently started a new medication (JARDIANCE) and thinks this is where it may have come from.    Please advise...

## 2025-01-23 RX ORDER — FLUCONAZOLE 150 MG/1
150 TABLET ORAL
Qty: 2 TABLET | Refills: 0 | Status: SHIPPED | OUTPATIENT
Start: 2025-01-23 | End: 2025-01-27

## 2025-01-23 RX ORDER — KETOCONAZOLE 20 MG/G
CREAM TOPICAL
Qty: 30 G | Refills: 1 | Status: SHIPPED | OUTPATIENT
Start: 2025-01-23

## 2025-01-27 ENCOUNTER — HOSPITAL ENCOUNTER (OUTPATIENT)
Facility: HOSPITAL | Age: 85
Setting detail: OUTPATIENT SURGERY
Discharge: HOME | End: 2025-01-27
Attending: INTERNAL MEDICINE | Admitting: INTERNAL MEDICINE
Payer: MEDICARE

## 2025-01-27 VITALS
HEART RATE: 104 BPM | OXYGEN SATURATION: 96 % | DIASTOLIC BLOOD PRESSURE: 61 MMHG | TEMPERATURE: 97.5 F | RESPIRATION RATE: 22 BRPM | BODY MASS INDEX: 26.08 KG/M2 | HEIGHT: 66 IN | SYSTOLIC BLOOD PRESSURE: 108 MMHG | WEIGHT: 162.3 LBS

## 2025-01-27 DIAGNOSIS — I50.9 HEART FAILURE, UNSPECIFIED (HCC): ICD-10-CM

## 2025-01-27 DIAGNOSIS — I25.10 HEART FAILURE, SYSTOLIC, DUE TO CAD (HCC): Primary | ICD-10-CM

## 2025-01-27 DIAGNOSIS — I50.20 HEART FAILURE, SYSTOLIC, DUE TO CAD (HCC): Primary | ICD-10-CM

## 2025-01-27 DIAGNOSIS — I25.10 CAD (CORONARY ATHEROSCLEROTIC DISEASE): ICD-10-CM

## 2025-01-27 LAB
ECHO BSA: 1.85 M2
GLUCOSE BLD STRIP.AUTO-MCNC: 132 MG/DL (ref 65–117)
SERVICE CMNT-IMP: ABNORMAL

## 2025-01-27 PROCEDURE — C1887 CATHETER, GUIDING: HCPCS | Performed by: INTERNAL MEDICINE

## 2025-01-27 PROCEDURE — 6360000002 HC RX W HCPCS: Performed by: INTERNAL MEDICINE

## 2025-01-27 PROCEDURE — 99152 MOD SED SAME PHYS/QHP 5/>YRS: CPT | Performed by: INTERNAL MEDICINE

## 2025-01-27 PROCEDURE — C1894 INTRO/SHEATH, NON-LASER: HCPCS | Performed by: INTERNAL MEDICINE

## 2025-01-27 PROCEDURE — 76937 US GUIDE VASCULAR ACCESS: CPT | Performed by: INTERNAL MEDICINE

## 2025-01-27 PROCEDURE — C1769 GUIDE WIRE: HCPCS | Performed by: INTERNAL MEDICINE

## 2025-01-27 PROCEDURE — 93460 R&L HRT ART/VENTRICLE ANGIO: CPT | Performed by: INTERNAL MEDICINE

## 2025-01-27 PROCEDURE — 6360000004 HC RX CONTRAST MEDICATION: Performed by: INTERNAL MEDICINE

## 2025-01-27 PROCEDURE — 82962 GLUCOSE BLOOD TEST: CPT

## 2025-01-27 PROCEDURE — 2709999900 HC NON-CHARGEABLE SUPPLY: Performed by: INTERNAL MEDICINE

## 2025-01-27 PROCEDURE — C1725 CATH, TRANSLUMIN NON-LASER: HCPCS | Performed by: INTERNAL MEDICINE

## 2025-01-27 PROCEDURE — 2500000003 HC RX 250 WO HCPCS: Performed by: INTERNAL MEDICINE

## 2025-01-27 PROCEDURE — 82810 BLOOD GASES O2 SAT ONLY: CPT | Performed by: INTERNAL MEDICINE

## 2025-01-27 PROCEDURE — 99153 MOD SED SAME PHYS/QHP EA: CPT | Performed by: INTERNAL MEDICINE

## 2025-01-27 PROCEDURE — 2580000003 HC RX 258: Performed by: INTERNAL MEDICINE

## 2025-01-27 RX ORDER — SODIUM CHLORIDE 9 MG/ML
INJECTION, SOLUTION INTRAVENOUS PRN
Status: DISCONTINUED | OUTPATIENT
Start: 2025-01-27 | End: 2025-01-27 | Stop reason: HOSPADM

## 2025-01-27 RX ORDER — SODIUM CHLORIDE 9 MG/ML
INJECTION, SOLUTION INTRAVENOUS CONTINUOUS PRN
Status: COMPLETED | OUTPATIENT
Start: 2025-01-27 | End: 2025-01-27

## 2025-01-27 RX ORDER — MIDAZOLAM HYDROCHLORIDE 1 MG/ML
INJECTION, SOLUTION INTRAMUSCULAR; INTRAVENOUS PRN
Status: DISCONTINUED | OUTPATIENT
Start: 2025-01-27 | End: 2025-01-27 | Stop reason: HOSPADM

## 2025-01-27 RX ORDER — SODIUM CHLORIDE 0.9 % (FLUSH) 0.9 %
5-40 SYRINGE (ML) INJECTION PRN
Status: DISCONTINUED | OUTPATIENT
Start: 2025-01-27 | End: 2025-01-27 | Stop reason: HOSPADM

## 2025-01-27 RX ORDER — FUROSEMIDE 40 MG/1
40 TABLET ORAL
Qty: 48 TABLET | Refills: 3 | Status: SHIPPED | OUTPATIENT
Start: 2025-01-28 | End: 2025-01-30 | Stop reason: SDUPTHER

## 2025-01-27 RX ORDER — VERAPAMIL HYDROCHLORIDE 2.5 MG/ML
INJECTION, SOLUTION INTRAVENOUS PRN
Status: DISCONTINUED | OUTPATIENT
Start: 2025-01-27 | End: 2025-01-27 | Stop reason: HOSPADM

## 2025-01-27 RX ORDER — CARVEDILOL 3.12 MG/1
3.12 TABLET ORAL 2 TIMES DAILY WITH MEALS
Qty: 180 TABLET | Refills: 3 | Status: SHIPPED | OUTPATIENT
Start: 2025-01-27 | End: 2025-01-30 | Stop reason: SDUPTHER

## 2025-01-27 RX ORDER — SODIUM CHLORIDE 0.9 % (FLUSH) 0.9 %
5-40 SYRINGE (ML) INJECTION EVERY 12 HOURS SCHEDULED
Status: DISCONTINUED | OUTPATIENT
Start: 2025-01-27 | End: 2025-01-27 | Stop reason: HOSPADM

## 2025-01-27 RX ORDER — ACETAMINOPHEN 325 MG/1
650 TABLET ORAL EVERY 4 HOURS PRN
Status: DISCONTINUED | OUTPATIENT
Start: 2025-01-27 | End: 2025-01-27 | Stop reason: HOSPADM

## 2025-01-27 RX ORDER — LIDOCAINE HYDROCHLORIDE 10 MG/ML
INJECTION, SOLUTION INFILTRATION; PERINEURAL PRN
Status: DISCONTINUED | OUTPATIENT
Start: 2025-01-27 | End: 2025-01-27 | Stop reason: HOSPADM

## 2025-01-27 RX ORDER — SODIUM CHLORIDE 9 MG/ML
INJECTION, SOLUTION INTRAVENOUS CONTINUOUS
Status: DISCONTINUED | OUTPATIENT
Start: 2025-01-27 | End: 2025-01-27 | Stop reason: HOSPADM

## 2025-01-27 RX ORDER — FENTANYL CITRATE 50 UG/ML
INJECTION, SOLUTION INTRAMUSCULAR; INTRAVENOUS PRN
Status: DISCONTINUED | OUTPATIENT
Start: 2025-01-27 | End: 2025-01-27 | Stop reason: HOSPADM

## 2025-01-27 RX ORDER — IOPAMIDOL 755 MG/ML
INJECTION, SOLUTION INTRAVASCULAR PRN
Status: DISCONTINUED | OUTPATIENT
Start: 2025-01-27 | End: 2025-01-27 | Stop reason: HOSPADM

## 2025-01-27 RX ORDER — HEPARIN SODIUM 200 [USP'U]/100ML
INJECTION, SOLUTION INTRAVENOUS PRN
Status: DISCONTINUED | OUTPATIENT
Start: 2025-01-27 | End: 2025-01-27 | Stop reason: HOSPADM

## 2025-01-27 RX ORDER — SODIUM CHLORIDE 9 MG/ML
INJECTION, SOLUTION INTRAVENOUS CONTINUOUS
Status: DISCONTINUED | OUTPATIENT
Start: 2025-01-27 | End: 2025-01-27

## 2025-01-27 NOTE — TELEPHONE ENCOUNTER
Per VO by MD.    Future Appointments   Date Time Provider Department Center   1/30/2025  8:40 AM [facility]   [facility]   2/14/2025 11:40 AM Ray Mosher MD CAVSF BS AMB   5/20/2025 11:20 AM Mikey Carney MD NE[facility] BS AMB

## 2025-01-27 NOTE — PROCEDURES
BRIEF PROCEDURE NOTE    Date of Procedure: 1/27/2025   Preoperative Diagnosis: CMP ( hx of NICM)  Postoperative Diagnosis: Non obstructive CAD    Procedure: Left heart cath, LV angiography, coronary angiography  Interventional Cardiologist: Richard Aviles MD  Assistant : none  Anesthesia: local + IV sedation  I administered moderate sedation throughout this procedure. An independent trained observer pushed medications at my direction, and monitored the patient’s level of consciousness and physiological status throughout.  Estimated Blood Loss: Minimal    Access: R Radial Access - 6 F sheath  - unable to wire into brachial with versicor ( tortuosity/Ca++)    R CFA - Ultrasound/Fluoroscopic guided micropuncture access - 6 F sheath - very tortuous iliac  - 10cm sheath exchanged for 25 cm sheath. All catheter exchanges over exchange length guidewire    R Brachial vein - IV exchanged for 6 F sheath under Fluoroscopic guidance     Catheters: RCA : JR4                    LCA : EBU 4 ( ectatic aorta - difficulty engaging with JL4)    Findings:     Cor Ca - present    L Main:  large/long - MLI    LAD: Med to large; Mid - diffuse 40- 50% ( Ca++) ; D1 - Prox - med; Mid- small; Prox 50%    LCflex: Prox - med; Mid - small; MLI; OM1 - large; MLI    RCA: Med; MLI; PDA and PLB - small; MLI    LVEDP: 16 mm Hg    LVEF: Not assessed    No significant gradient across aortic valve.    PCI: none        RHC findings:    RAPm= 1       mmHg  RVSP= 28    mmHg  PAPm= 17       mmHg  PCWPm= 5   mmHg  CO=  4.9        l/min  CI= 2.68    Specimens Removed : None    Devices implanted : None    Complications: None    Closure Device: R Radial - TR band; R CFA - manual; R Brachial vein - Manual        See full cath note.    Complications: none    Richard Aviles MD

## 2025-01-27 NOTE — PROGRESS NOTES
Patient arrived. ID and allergies verified verbally with patient. Pt voices understanding of procedure to be performed. Consent obtained. Pt prepped for procedure. Pt denies contrast allergy. Patient denies taking any blood thinners.    ASA- yesterday    9:00 am  TRANSFER - OUT REPORT:    Verbal report given to Cira Sandy  being transferred to cath lab for ordered procedure       Report consisted of patient's Situation, Background, Assessment and   Recommendations(SBAR).     Information from the following report(s) Nurse Handoff Report was reviewed with the receiving nurse.           Lines:   Peripheral IV 01/27/25 Right Forearm (Active)        Opportunity for questions and clarification was provided.      Patient transported with:  Registered Nurse          10:20 am  TRANSFER - IN REPORT:    Verbal report received from Baileypatrick Sandy  being received from cath lab for routine post-op      Report consisted of patient's Situation, Background, Assessment and   Recommendations(SBAR).     Information from the following report(s) Nurse Handoff Report was reviewed with the receiving nurse.    Opportunity for questions and clarification was provided.      Assessment completed upon patient's arrival to unit and care assumed.     10:30 am  Discharge instructions and prescriptions reviewed with  Patient and daughter. Opportunity provided for questions. Patient and daughter verbalized understanding. Signed copy of discharge placed in the front of patient's chart.     11:25 am  2 ml air released from TR Band. No bleeding or hematoma noted. Radial and Ulnar pulse on right wrist palpable. Pt tolerated well. Will continue to monitor.  11:30 am  2 ml air released from TR Band. No bleeding or hematoma noted. Radial and Ulnar pulse on right wrist palpable. Pt tolerated well. Will continue to monitor.  11:35 am  3 ml air released from TR Band. No bleeding or hematoma noted. Radial and Ulnar pulse on right wrist

## 2025-01-27 NOTE — H&P
Pt personally seen and examined. Chart reviewed.    H and P as per office note 12/13/124    Blood pressure 109/69, pulse 86, temperature 97.5 °F (36.4 °C), temperature source Temporal, resp. rate 20, height 1.676 m (5' 6\"), weight 73.6 kg (162 lb 4.8 oz), SpO2 97%.      CVS-S1-S2 present,2/6 systolic murmur present  RS-   CTAB       Abdomen-  soft/NT  LE-   no edema    A/P :    NICM/AS - LHC/RHC - RIBA of procedure explained to pt. Consent obtained    Discussed with patient/nursing/family    Richard Felix MD, FACC      12/13/24  Stanley Sandy     1940       Ray Mosher MD, [facility]  Date of Visit-12/13/2024   PCP is Abdiel Joel MD   [facility]  HPI:  Stanley Sandy is a 84 y.o. male   Seen 8/14, dtr called 12/6 indicating more SOB x 3 d  Sent to ER and admit overnight with CHF     CHF, RCA MI, XOL, PVCs     Today..  He says he got more short of breath his stomach was swelling and he had trouble getting out of his truck.  Lasted for about 3 days till he called.  He went in on a Friday to the hospital was discharged the next day.  He says the Lasix makes him urinate a lot he did not take it yesterday or today.  Weight is down 14 pounds  He is tolerating the Jardiance and Entresto without difficulty.  EF 25% at hospital, RV down, mild to mod AS  He gets dizzy sometimes in the morning with the meds  His sob is much improved though not normal  Mild mod HERRERA  He has no chest pain or palps  Edema is mild per pt           Adjust OMT meds  A-Mavik change to Entresto Tier 1  B-Coreg 3 bid  S-Added Jardiance  D- On lasix      NT Pro-BNP (PG/ML)   Date Value   12/06/2024 5,124 (H)             Lab Results   Component Value Date     CREATININE 1.00 12/07/2024            Lab Results   Component Value Date     K 3.4 (L) 12/07/2024         Reduced EF, CHF with Ischemic CMY---Echo 12/16/22  EF 32%  DD, NCCMY, sclerosis AV with mild AS , mild MR,

## 2025-01-30 ENCOUNTER — OFFICE VISIT (OUTPATIENT)
Facility: CLINIC | Age: 85
End: 2025-01-30

## 2025-01-30 ENCOUNTER — TELEPHONE (OUTPATIENT)
Age: 85
End: 2025-01-30

## 2025-01-30 VITALS
RESPIRATION RATE: 16 BRPM | DIASTOLIC BLOOD PRESSURE: 57 MMHG | OXYGEN SATURATION: 96 % | WEIGHT: 163.6 LBS | BODY MASS INDEX: 26.29 KG/M2 | HEIGHT: 66 IN | HEART RATE: 71 BPM | TEMPERATURE: 97.7 F | SYSTOLIC BLOOD PRESSURE: 89 MMHG

## 2025-01-30 DIAGNOSIS — E78.00 PURE HYPERCHOLESTEROLEMIA, UNSPECIFIED: ICD-10-CM

## 2025-01-30 DIAGNOSIS — C61 PROSTATE CANCER (HCC): ICD-10-CM

## 2025-01-30 DIAGNOSIS — I25.10 HEART FAILURE, SYSTOLIC, DUE TO CAD (HCC): ICD-10-CM

## 2025-01-30 DIAGNOSIS — G70.00 MYASTHENIA GRAVIS WITHOUT (ACUTE) EXACERBATION (HCC): ICD-10-CM

## 2025-01-30 DIAGNOSIS — E11.9 DIET-CONTROLLED DIABETES MELLITUS (HCC): Primary | ICD-10-CM

## 2025-01-30 DIAGNOSIS — I50.20 HEART FAILURE, SYSTOLIC, DUE TO CAD (HCC): ICD-10-CM

## 2025-01-30 RX ORDER — CARVEDILOL 3.12 MG/1
3.12 TABLET ORAL 2 TIMES DAILY WITH MEALS
Qty: 28 TABLET | Refills: 0 | Status: SHIPPED | OUTPATIENT
Start: 2025-01-30

## 2025-01-30 RX ORDER — FUROSEMIDE 40 MG/1
40 TABLET ORAL
Qty: 14 TABLET | Refills: 0 | Status: SHIPPED | OUTPATIENT
Start: 2025-01-30

## 2025-01-30 ASSESSMENT — PATIENT HEALTH QUESTIONNAIRE - PHQ9
SUM OF ALL RESPONSES TO PHQ QUESTIONS 1-9: 0
SUM OF ALL RESPONSES TO PHQ QUESTIONS 1-9: 0
SUM OF ALL RESPONSES TO PHQ9 QUESTIONS 1 & 2: 0
SUM OF ALL RESPONSES TO PHQ QUESTIONS 1-9: 0
1. LITTLE INTEREST OR PLEASURE IN DOING THINGS: NOT AT ALL
2. FEELING DOWN, DEPRESSED OR HOPELESS: NOT AT ALL
SUM OF ALL RESPONSES TO PHQ QUESTIONS 1-9: 0

## 2025-01-30 NOTE — PROGRESS NOTES
Chief Complaint   Patient presents with    Follow-up Chronic Condition      \"Have you been to the ER, urgent care clinic since your last visit?  Hospitalized since your last visit?\"    NO    “Have you seen or consulted any other health care providers outside of [facility] since your last visit?”    NO            Click Here for Release of Records Request     Health Maintenance Due   Topic Date Due    Prostate Specific Antigen (PSA) Screening or Monitoring  Never done    Diabetic foot exam  03/22/2022    Diabetic Alb to Cr ratio (uACR) test  03/01/2023

## 2025-01-30 NOTE — TELEPHONE ENCOUNTER
Patient's daughter Esther is calling because her father needs a refill on Jardaiance 10 mg, Entresto 24-26,Carvedilol 3.125 mg, Furosemide 40 mg.    Patient is completely out of medication and needs a short refill sent to his local pharmacy.While they wait for the medicine to come from [facility].    Pharmacy:  [id number] [place] [address] - P [phone] - F [phone] Esther (daughter)

## 2025-01-30 NOTE — TELEPHONE ENCOUNTER
Attempted to reach patient's daughter by telephone. A message was left for return call.     Per VO by MD.    Future Appointments   Date Time Provider Department Center   [facility]   2/14/2025 11:40 AM Ray Mosher MD CAV[facility] BS AMB   5/20/2025 11:20 AM Mikey Carney MD [facility] BS AMB   6/16/2025  8:40 AM [facility]

## 2025-01-31 LAB
CREAT UR-MCNC: 94.1 MG/DL
MICROALBUMIN UR-MCNC: 1.52 MG/DL
MICROALBUMIN/CREAT UR-RTO: 16 MG/G (ref 0–30)
SPECIMEN HOLD: NORMAL

## 2025-01-31 NOTE — PROGRESS NOTES
Progress Note    Patient: Stanley Sandy MRN: [id number]  SSN: [id number]    YOB: 1940  Age: 84 y.o.  Sex: male        Chief Complaint   Patient presents with    Follow-up Chronic Condition     Left and right heart cath / coronary angiography     he is a 84 y.o. year old male who presents for follow up of chronic health conditions. Patient with diagnoses of T2D, hypothyroidism, HLD and CHF. He is followed by neurology for MG. Patient denies HA, dizziness, SOB, CP, abdominal pain, dysuria, acute myalgias or arthralgias. He is due for lab work.    Encounter Diagnoses   Name Primary?    Diet-controlled diabetes mellitus (HCC) Yes    Pure hypercholesterolemia, unspecified     Myasthenia gravis without (acute) exacerbation (HCC)     Prostate cancer (HCC)      BP Readings from Last 3 Encounters:   01/30/25 (!) 89/57   01/27/25 108/61   12/19/24 (!) 94/57     Wt Readings from Last 3 Encounters:   01/30/25 74.2 kg (163 lb 9.6 oz)   01/27/25 73.6 kg (162 lb 4.8 oz)   12/19/24 73.9 kg (163 lb)     Body mass index is 26.41 kg/m².    CMP:    Lab Results   Component Value Date/Time     12/30/2024 09:55 AM    K 4.5 12/30/2024 09:55 AM     12/30/2024 09:55 AM    CO2 29 12/30/2024 09:55 AM    BUN 9 12/30/2024 09:55 AM    CREATININE 0.96 12/30/2024 09:55 AM    GFRAA >60 03/01/2022 08:55 AM    AGRATIO 1.4 02/02/2023 10:45 AM    LABGLOM 78 12/30/2024 09:55 AM    LABGLOM 73 04/10/2024 09:15 AM    LABGLOM >60 02/02/2023 10:45 AM    GLUCOSE 111 12/30/2024 09:55 AM    CALCIUM 8.8 12/30/2024 09:55 AM    BILITOT 1.2 12/30/2024 09:55 AM    ALKPHOS 158 12/30/2024 09:55 AM    ALKPHOS 62 02/02/2023 10:45 AM    AST 40 12/30/2024 09:55 AM    ALT 60 12/30/2024 09:55 AM     HgBA1c:    Lab Results   Component Value Date/Time    LABA1C 6.2 12/30/2024 09:55 AM     FLP:    Lab Results   Component Value Date/Time    TRIG 84 12/30/2024 09:55 AM    HDL 43 12/30/2024 09:55 AM       Patient Active Problem List   Diagnosis    Colon

## 2025-02-08 RX ORDER — LEVOTHYROXINE SODIUM 50 UG/1
TABLET ORAL
Qty: 90 TABLET | Refills: 1 | Status: SHIPPED | OUTPATIENT
Start: 2025-02-08

## 2025-02-14 ENCOUNTER — OFFICE VISIT (OUTPATIENT)
Age: 85
End: 2025-02-14
Payer: MEDICARE

## 2025-02-14 ENCOUNTER — ANCILLARY PROCEDURE (OUTPATIENT)
Age: 85
End: 2025-02-14
Payer: MEDICARE

## 2025-02-14 VITALS
HEART RATE: 60 BPM | OXYGEN SATURATION: 98 % | HEIGHT: 66 IN | DIASTOLIC BLOOD PRESSURE: 70 MMHG | WEIGHT: 162 LBS | BODY MASS INDEX: 26.03 KG/M2 | SYSTOLIC BLOOD PRESSURE: 116 MMHG

## 2025-02-14 VITALS
HEART RATE: 82 BPM | HEIGHT: 66 IN | BODY MASS INDEX: 26.03 KG/M2 | WEIGHT: 162 LBS | SYSTOLIC BLOOD PRESSURE: 116 MMHG | DIASTOLIC BLOOD PRESSURE: 70 MMHG

## 2025-02-14 DIAGNOSIS — I35.0 NONRHEUMATIC AORTIC VALVE STENOSIS: ICD-10-CM

## 2025-02-14 DIAGNOSIS — Z78.9 STATIN INTOLERANCE: ICD-10-CM

## 2025-02-14 DIAGNOSIS — I50.22 CHRONIC SYSTOLIC CONGESTIVE HEART FAILURE (HCC): ICD-10-CM

## 2025-02-14 DIAGNOSIS — I49.3 ASYMPTOMATIC PVCS: ICD-10-CM

## 2025-02-14 DIAGNOSIS — I25.10 CORONARY ARTERY DISEASE INVOLVING NATIVE CORONARY ARTERY OF NATIVE HEART WITHOUT ANGINA PECTORIS: ICD-10-CM

## 2025-02-14 DIAGNOSIS — I25.5 ISCHEMIC CARDIOMYOPATHY: ICD-10-CM

## 2025-02-14 DIAGNOSIS — I42.8 NICM (NONISCHEMIC CARDIOMYOPATHY) (HCC): ICD-10-CM

## 2025-02-14 DIAGNOSIS — E78.00 HYPERCHOLESTEREMIA: ICD-10-CM

## 2025-02-14 DIAGNOSIS — I50.22 CHRONIC SYSTOLIC CONGESTIVE HEART FAILURE (HCC): Primary | ICD-10-CM

## 2025-02-14 PROCEDURE — 99214 OFFICE O/P EST MOD 30 MIN: CPT | Performed by: SPECIALIST

## 2025-02-14 PROCEDURE — 93308 TTE F-UP OR LMTD: CPT | Performed by: SPECIALIST

## 2025-02-14 NOTE — PROGRESS NOTES
Chief Complaint   Patient presents with    Follow-up     Vitals:    02/14/25 1126   BP: 116/70   Site: Left Upper Arm   Position: Sitting   Pulse: 60   SpO2: 98%   Weight: 73.5 kg (162 lb)   Height: 1.676 m (5' 6\")         Chest pain: DENIED     Recent hospital stays: DENIED     Refills: DENIED   
No chief complaint on file.    There were no vitals filed for this visit.    Chest pain NO     ER, urgent care, or hospitalized outside of [facility] since your last visit?  NO     Refills NO   
AM       Wt Readings from Last 3 Encounters:   02/14/25 73.5 kg (162 lb)   02/14/25 73.5 kg (162 lb)   01/30/25 74.2 kg (163 lb 9.6 oz)      BP Readings from Last 3 Encounters:   02/14/25 116/70   02/14/25 116/70   01/30/25 (!) 89/57        Current Outpatient Medications:     levothyroxine (SYNTHROID) 50 MCG tablet, TAKE 1 TABLET DAILY 30 MINUTES PRIOR TO FIRST MEAL, Disp: 90 tablet, Rfl: 1    carvedilol (COREG) 3.125 MG tablet, Take 1 tablet by mouth 2 times daily (with meals), Disp: 28 tablet, Rfl: 0    furosemide (LASIX) 40 MG tablet, Take 1 tablet by mouth four times a week, Disp: 14 tablet, Rfl: 0    empagliflozin (JARDIANCE) 10 MG tablet, Take 1 tablet by mouth daily, Disp: 90 tablet, Rfl: 3    sacubitril-valsartan (ENTRESTO) 24-26 MG per tablet, Take 1 tablet by mouth 2 times daily, Disp: 180 tablet, Rfl: 3    ketoconazole (NIZORAL) 2 % cream, Apply topically to affected area once daily., Disp: 30 g, Rfl: 1    vitamin B-12 (CYANOCOBALAMIN) 1000 MCG tablet, Take 1 tablet by mouth daily, Disp: , Rfl:     B Complex Vitamins (B COMPLEX PO), Take 1 tablet by mouth daily, Disp: , Rfl:     pyRIDostigmine (MESTINON) 60 MG tablet, Take 1 tablet by mouth in the morning and at bedtime, Disp: 180 tablet, Rfl: 1    Multiple Vitamins-Minerals (MULTIVITAMIN ADULTS 50+ PO), Take 1 tablet by mouth daily, Disp: , Rfl:     aspirin 81 MG EC tablet, Take by mouth daily, Disp: , Rfl:     vitamin D 25 MCG (1000 UT) CAPS, Take by mouth daily, Disp: , Rfl:     FREESTYLE LITE strip, As needed. (Patient not taking: Reported on 2/14/2025), Disp: 100 each, Rfl: 1   Impression see above.

## 2025-02-15 LAB
ECHO AO ASC DIAM: 4 CM
ECHO AO ASCENDING AORTA INDEX: 2.19 CM/M2
ECHO AO ROOT DIAM: 3.5 CM
ECHO AO ROOT INDEX: 1.91 CM/M2
ECHO AV AREA PEAK VELOCITY: 1.5 CM2
ECHO AV AREA VTI: 1.5 CM2
ECHO AV AREA/BSA PEAK VELOCITY: 0.8 CM2/M2
ECHO AV AREA/BSA VTI: 0.8 CM2/M2
ECHO AV MEAN GRADIENT: 5 MMHG
ECHO AV MEAN VELOCITY: 1.1 M/S
ECHO AV PEAK GRADIENT: 9 MMHG
ECHO AV PEAK VELOCITY: 1.5 M/S
ECHO AV VELOCITY RATIO: 0.4
ECHO AV VTI: 31.7 CM
ECHO BSA: 1.85 M2
ECHO EST RA PRESSURE: 3 MMHG
ECHO LA DIAMETER INDEX: 2.84 CM/M2
ECHO LA DIAMETER: 5.2 CM
ECHO LA TO AORTIC ROOT RATIO: 1.49
ECHO LV EDV A2C: 204 ML
ECHO LV EDV A4C: 226 ML
ECHO LV EDV BP: 220 ML (ref 67–155)
ECHO LV EDV INDEX A4C: 123 ML/M2
ECHO LV EDV INDEX BP: 120 ML/M2
ECHO LV EDV NDEX A2C: 111 ML/M2
ECHO LV EJECTION FRACTION A2C: 21 %
ECHO LV EJECTION FRACTION A4C: 22 %
ECHO LV EJECTION FRACTION BIPLANE: 22 % (ref 55–100)
ECHO LV ESV A2C: 162 ML
ECHO LV ESV A4C: 176 ML
ECHO LV ESV BP: 173 ML (ref 22–58)
ECHO LV ESV INDEX A2C: 89 ML/M2
ECHO LV ESV INDEX A4C: 96 ML/M2
ECHO LV ESV INDEX BP: 95 ML/M2
ECHO LV FRACTIONAL SHORTENING: 12 % (ref 28–44)
ECHO LV INTERNAL DIMENSION DIASTOLE INDEX: 3.22 CM/M2
ECHO LV INTERNAL DIMENSION DIASTOLIC: 5.9 CM (ref 4.2–5.9)
ECHO LV INTERNAL DIMENSION SYSTOLIC INDEX: 2.84 CM/M2
ECHO LV INTERNAL DIMENSION SYSTOLIC: 5.2 CM
ECHO LV IVSD: 1.1 CM (ref 0.6–1)
ECHO LV MASS 2D: 255.7 G (ref 88–224)
ECHO LV MASS INDEX 2D: 139.7 G/M2 (ref 49–115)
ECHO LV POSTERIOR WALL DIASTOLIC: 1 CM (ref 0.6–1)
ECHO LV RELATIVE WALL THICKNESS RATIO: 0.34
ECHO LVOT AREA: 3.8 CM2
ECHO LVOT AV VTI INDEX: 0.39
ECHO LVOT DIAM: 2.2 CM
ECHO LVOT MEAN GRADIENT: 1 MMHG
ECHO LVOT PEAK GRADIENT: 1 MMHG
ECHO LVOT PEAK VELOCITY: 0.6 M/S
ECHO LVOT STROKE VOLUME INDEX: 25.7 ML/M2
ECHO LVOT SV: 47.1 ML
ECHO LVOT VTI: 12.4 CM
ECHO RIGHT VENTRICULAR SYSTOLIC PRESSURE (RVSP): 33 MMHG
ECHO TV MAX VELOCITY: 2.8 M/S
ECHO TV REGURGITANT MAX VELOCITY: 2.75 M/S
ECHO TV REGURGITANT PEAK GRADIENT: 30 MMHG

## 2025-02-15 PROCEDURE — 93325 DOPPLER ECHO COLOR FLOW MAPG: CPT | Performed by: SPECIALIST

## 2025-02-15 PROCEDURE — 93321 DOPPLER ECHO F-UP/LMTD STD: CPT | Performed by: SPECIALIST

## 2025-02-15 PROCEDURE — 93308 TTE F-UP OR LMTD: CPT | Performed by: SPECIALIST

## 2025-03-24 DIAGNOSIS — C61 PROSTATE CANCER (HCC): Primary | ICD-10-CM

## 2025-03-24 DIAGNOSIS — G70.00 MYASTHENIA GRAVIS WITHOUT (ACUTE) EXACERBATION: ICD-10-CM

## 2025-03-24 DIAGNOSIS — E11.9 DIET-CONTROLLED DIABETES MELLITUS (HCC): ICD-10-CM

## 2025-03-24 DIAGNOSIS — C61 PROSTATE CANCER (HCC): ICD-10-CM

## 2025-03-24 DIAGNOSIS — H02.402 UNSPECIFIED PTOSIS OF LEFT EYELID: ICD-10-CM

## 2025-03-24 DIAGNOSIS — I50.22 CHRONIC SYSTOLIC CONGESTIVE HEART FAILURE (HCC): ICD-10-CM

## 2025-03-24 DIAGNOSIS — I25.10 CORONARY ARTERY DISEASE INVOLVING NATIVE CORONARY ARTERY OF NATIVE HEART WITHOUT ANGINA PECTORIS: ICD-10-CM

## 2025-03-24 DIAGNOSIS — I25.5 ISCHEMIC CARDIOMYOPATHY: ICD-10-CM

## 2025-03-24 DIAGNOSIS — E78.00 PURE HYPERCHOLESTEROLEMIA, UNSPECIFIED: ICD-10-CM

## 2025-03-24 RX ORDER — PYRIDOSTIGMINE BROMIDE 60 MG/1
60 TABLET ORAL 2 TIMES DAILY
Qty: 180 TABLET | Refills: 1 | Status: SHIPPED | OUTPATIENT
Start: 2025-03-24

## 2025-03-25 ENCOUNTER — RESULTS FOLLOW-UP (OUTPATIENT)
Facility: CLINIC | Age: 85
End: 2025-03-25

## 2025-03-25 LAB
ALBUMIN SERPL-MCNC: 4.1 G/DL (ref 3.5–5)
ALBUMIN/GLOB SERPL: 1.6 (ref 1.1–2.2)
ALP SERPL-CCNC: 61 U/L (ref 45–117)
ALT SERPL-CCNC: 24 U/L (ref 12–78)
ANION GAP SERPL CALC-SCNC: 6 MMOL/L (ref 2–12)
AST SERPL-CCNC: 22 U/L (ref 15–37)
BILIRUB SERPL-MCNC: 0.7 MG/DL (ref 0.2–1)
BUN SERPL-MCNC: 17 MG/DL (ref 6–20)
BUN/CREAT SERPL: 18 (ref 12–20)
CALCIUM SERPL-MCNC: 9.3 MG/DL (ref 8.5–10.1)
CHLORIDE SERPL-SCNC: 105 MMOL/L (ref 97–108)
CHOLEST SERPL-MCNC: 207 MG/DL
CO2 SERPL-SCNC: 28 MMOL/L (ref 21–32)
CREAT SERPL-MCNC: 0.93 MG/DL (ref 0.7–1.3)
ERYTHROCYTE [DISTWIDTH] IN BLOOD BY AUTOMATED COUNT: 13.1 % (ref 11.5–14.5)
EST. AVERAGE GLUCOSE BLD GHB EST-MCNC: 103 MG/DL
GLOBULIN SER CALC-MCNC: 2.5 G/DL (ref 2–4)
GLUCOSE SERPL-MCNC: 94 MG/DL (ref 65–100)
HBA1C MFR BLD: 5.2 % (ref 4–5.6)
HCT VFR BLD AUTO: 50.1 % (ref 36.6–50.3)
HDLC SERPL-MCNC: 46 MG/DL
HDLC SERPL: 4.5 (ref 0–5)
HGB BLD-MCNC: 16.1 G/DL (ref 12.1–17)
LDLC SERPL CALC-MCNC: 140.8 MG/DL (ref 0–100)
MCH RBC QN AUTO: 33.5 PG (ref 26–34)
MCHC RBC AUTO-ENTMCNC: 32.1 G/DL (ref 30–36.5)
MCV RBC AUTO: 104.4 FL (ref 80–99)
NRBC # BLD: 0 K/UL (ref 0–0.01)
NRBC BLD-RTO: 0 PER 100 WBC
NT PRO BNP: 1205 PG/ML
PLATELET # BLD AUTO: 177 K/UL (ref 150–400)
PMV BLD AUTO: 10.5 FL (ref 8.9–12.9)
POTASSIUM SERPL-SCNC: 3.8 MMOL/L (ref 3.5–5.1)
PROT SERPL-MCNC: 6.6 G/DL (ref 6.4–8.2)
PSA SERPL-MCNC: 0 NG/ML (ref 0.01–4)
RBC # BLD AUTO: 4.8 M/UL (ref 4.1–5.7)
SODIUM SERPL-SCNC: 139 MMOL/L (ref 136–145)
TRIGL SERPL-MCNC: 101 MG/DL
TSH SERPL DL<=0.05 MIU/L-ACNC: 1.92 UIU/ML (ref 0.36–3.74)
VLDLC SERPL CALC-MCNC: 20.2 MG/DL
WBC # BLD AUTO: 5.9 K/UL (ref 4.1–11.1)

## 2025-05-02 DIAGNOSIS — I25.10 HEART FAILURE, SYSTOLIC, DUE TO CAD (HCC): ICD-10-CM

## 2025-05-02 DIAGNOSIS — I50.20 HEART FAILURE, SYSTOLIC, DUE TO CAD (HCC): ICD-10-CM

## 2025-05-02 RX ORDER — LEVOTHYROXINE SODIUM 50 UG/1
50 TABLET ORAL DAILY
Qty: 90 TABLET | Refills: 0 | Status: SHIPPED | OUTPATIENT
Start: 2025-05-02

## 2025-05-06 ENCOUNTER — TELEPHONE (OUTPATIENT)
Age: 85
End: 2025-05-06

## 2025-05-06 DIAGNOSIS — I50.20 HEART FAILURE, SYSTOLIC, DUE TO CAD (HCC): ICD-10-CM

## 2025-05-06 DIAGNOSIS — I25.10 HEART FAILURE, SYSTOLIC, DUE TO CAD (HCC): ICD-10-CM

## 2025-05-06 NOTE — TELEPHONE ENCOUNTER
Patient's daughter Esther is calling because she is trying to find out if her father is suppose to continue taking Lasix.40 mg.Please advise.    [phone] Esther (daughter)

## 2025-05-07 RX ORDER — FUROSEMIDE 40 MG/1
40 TABLET ORAL
Qty: 48 TABLET | Refills: 3 | Status: SHIPPED | OUTPATIENT
Start: 2025-05-08

## 2025-05-07 NOTE — TELEPHONE ENCOUNTER
Verified patient with two types of identifiers. Went over med list and sent refill per daughter's request. Confirmed follow up next week.     Per VO by MD.    Future Appointments   Date Time Provider Department Center   5/12/2025  8:00 AM Abdiel Joel MD [facility] DEP   5/14/2025  9:20 AM Huma Sosa, APRN - NP CAVBL BS AMB   6/16/2025  8:40 AM Abdiel Joel MD [facility] DEP   7/2/2025  3:00 PM Roxy Walden, APRN - NP [facility] BS AMB   8/29/2025 11:20 AM Ray Mosher MD CAVSF BS AMB

## 2025-05-12 ENCOUNTER — OFFICE VISIT (OUTPATIENT)
Facility: CLINIC | Age: 85
End: 2025-05-12

## 2025-05-12 VITALS
HEART RATE: 63 BPM | SYSTOLIC BLOOD PRESSURE: 94 MMHG | TEMPERATURE: 97.6 F | DIASTOLIC BLOOD PRESSURE: 62 MMHG | BODY MASS INDEX: 26.68 KG/M2 | WEIGHT: 166 LBS | HEIGHT: 66 IN | OXYGEN SATURATION: 97 % | RESPIRATION RATE: 16 BRPM

## 2025-05-12 DIAGNOSIS — G70.00 MYASTHENIA GRAVIS WITHOUT (ACUTE) EXACERBATION (HCC): ICD-10-CM

## 2025-05-12 DIAGNOSIS — E11.9 DIET-CONTROLLED DIABETES MELLITUS (HCC): ICD-10-CM

## 2025-05-12 DIAGNOSIS — H25.013 CORTICAL AGE-RELATED CATARACT OF BOTH EYES: Primary | ICD-10-CM

## 2025-05-12 DIAGNOSIS — E78.00 PURE HYPERCHOLESTEROLEMIA, UNSPECIFIED: ICD-10-CM

## 2025-05-12 DIAGNOSIS — E03.4 ATROPHY OF THYROID (ACQUIRED): ICD-10-CM

## 2025-05-12 DIAGNOSIS — Z01.818 PRE-OP EXAMINATION: ICD-10-CM

## 2025-05-12 PROBLEM — E11.36 TYPE 2 DIABETES MELLITUS WITH DIABETIC CATARACT (HCC): Status: ACTIVE | Noted: 2025-05-12

## 2025-05-12 RX ORDER — POLYETHYLENE GLYCOL 400 2.5 MG/ML
SOLUTION/ DROPS OPHTHALMIC
COMMUNITY

## 2025-05-12 RX ORDER — CHLORAL HYDRATE 500 MG
1 CAPSULE ORAL
COMMUNITY

## 2025-05-12 RX ORDER — TRANDOLAPRIL TABLETS 2 MG/1
TABLET ORAL
COMMUNITY
End: 2025-05-14

## 2025-05-12 RX ORDER — EZETIMIBE 10 MG/1
10 TABLET ORAL DAILY
Qty: 30 TABLET | Refills: 0 | Status: SHIPPED | OUTPATIENT
Start: 2025-05-12

## 2025-05-12 ASSESSMENT — PATIENT HEALTH QUESTIONNAIRE - PHQ9
SUM OF ALL RESPONSES TO PHQ QUESTIONS 1-9: 0
2. FEELING DOWN, DEPRESSED OR HOPELESS: NOT AT ALL
1. LITTLE INTEREST OR PLEASURE IN DOING THINGS: NOT AT ALL

## 2025-05-12 NOTE — PROGRESS NOTES
Chief Complaint   Patient presents with    Pre-op Exam        \"Have you been to the ER, urgent care clinic since your last visit?  Hospitalized since your last visit?\"    NO    “Have you seen or consulted any other health care providers outside of [facility] since your last visit?”    Nakia [facility]END[facility] urology             Click Here for Release of Records Request     Health Maintenance Due   Topic Date Due    Diabetic foot exam  03/22/2022    Annual Wellness Visit (Medicare)  04/10/2025       
swelling)     No current facility-administered medications for this visit.     Allergies   Allergen Reactions    Guaifenesin Hives    Penicillins Rash and Swelling       Review of Systems:  Constitutional: Negative for fatigue, malaise  Resp: Negative for cough, wheezing or SOB  CV: Negative for chest pain, dizziness or palpitations  GI: Negative for nausea or abdominal pain  MS: Negative for acute myalgias or arthralgias   Neuro: see HPI, Negative for acute weakness or paresthesia  Psych: Negative for depression or anxiety     Vitals:    05/12/25 0807   BP: 94/62   Pulse: 63   Resp: 16   Temp: 97.6 °F (36.4 °C)   SpO2: 97%   Weight: 75.3 kg (166 lb)   Height: 1.676 m (5' 6\")       Physical Examination:  General: Well developed, well nourished, in no acute distress  Head: Normocephalic, atraumatic  Eyes: Sclera clear, EOMI  Neck: Normal range of motion  Respiratory: CTAB with symmetrical, unlabored effort  Cardiovascular: Regular rate and rhythm  Extremities: Full range of motion, normal gait  Neurologic: No focal deficits  Psych: Active, alert and oriented. Affect appropriate      Diagnosis Orders   1. Cortical age-related cataract of both eyes        2. Pre-op examination        3. Pure hypercholesterolemia, unspecified  ezetimibe (ZETIA) 10 MG tablet      4. Atrophy of thyroid (acquired)        5. Diet-controlled diabetes mellitus (HCC)        6. Myasthenia gravis without (acute) exacerbation (HCC)              Plan of care:  Diagnoses were discussed in detail with patient.   Medications reviewed and appropriate.   Patient to continue current prescribed medications as written.    Medication risks/benefits/side effects discussed with patient.   All of the patient's questions were addressed and answered to apparent satisfaction.   The patient understands and agrees with our plan of care.  The patient knows to call back if they have questions about the plan of care or if symptoms change.  The patient received an

## 2025-05-14 ENCOUNTER — OFFICE VISIT (OUTPATIENT)
Age: 85
End: 2025-05-14
Payer: MEDICARE

## 2025-05-14 VITALS
OXYGEN SATURATION: 98 % | HEIGHT: 66 IN | BODY MASS INDEX: 26.68 KG/M2 | TEMPERATURE: 97.9 F | RESPIRATION RATE: 19 BRPM | DIASTOLIC BLOOD PRESSURE: 57 MMHG | WEIGHT: 166 LBS | HEART RATE: 52 BPM | SYSTOLIC BLOOD PRESSURE: 97 MMHG

## 2025-05-14 DIAGNOSIS — I50.22 CHRONIC SYSTOLIC CONGESTIVE HEART FAILURE (HCC): ICD-10-CM

## 2025-05-14 DIAGNOSIS — I25.10 CORONARY ARTERY DISEASE INVOLVING NATIVE CORONARY ARTERY OF NATIVE HEART WITHOUT ANGINA PECTORIS: ICD-10-CM

## 2025-05-14 DIAGNOSIS — I25.10 HEART FAILURE, SYSTOLIC, DUE TO CAD (HCC): Primary | ICD-10-CM

## 2025-05-14 DIAGNOSIS — I50.20 HFREF (HEART FAILURE WITH REDUCED EJECTION FRACTION) (HCC): ICD-10-CM

## 2025-05-14 DIAGNOSIS — Z01.818 PRE-OP EVALUATION: ICD-10-CM

## 2025-05-14 DIAGNOSIS — I35.0 NONRHEUMATIC AORTIC VALVE STENOSIS: ICD-10-CM

## 2025-05-14 DIAGNOSIS — I49.3 ASYMPTOMATIC PVCS: ICD-10-CM

## 2025-05-14 DIAGNOSIS — I42.8 NICM (NONISCHEMIC CARDIOMYOPATHY) (HCC): ICD-10-CM

## 2025-05-14 DIAGNOSIS — Z78.9 STATIN INTOLERANCE: ICD-10-CM

## 2025-05-14 DIAGNOSIS — E78.00 HYPERCHOLESTEREMIA: ICD-10-CM

## 2025-05-14 DIAGNOSIS — I50.20 HEART FAILURE, SYSTOLIC, DUE TO CAD (HCC): Primary | ICD-10-CM

## 2025-05-14 PROCEDURE — G8417 CALC BMI ABV UP PARAM F/U: HCPCS

## 2025-05-14 PROCEDURE — 1160F RVW MEDS BY RX/DR IN RCRD: CPT

## 2025-05-14 PROCEDURE — 1036F TOBACCO NON-USER: CPT

## 2025-05-14 PROCEDURE — 1123F ACP DISCUSS/DSCN MKR DOCD: CPT

## 2025-05-14 PROCEDURE — 1159F MED LIST DOCD IN RCRD: CPT

## 2025-05-14 PROCEDURE — 99214 OFFICE O/P EST MOD 30 MIN: CPT

## 2025-05-14 PROCEDURE — G8427 DOCREV CUR MEDS BY ELIG CLIN: HCPCS

## 2025-05-14 RX ORDER — FUROSEMIDE 20 MG/1
20 TABLET ORAL DAILY PRN
Qty: 60 TABLET | Refills: 3 | Status: SHIPPED
Start: 2025-05-14

## 2025-05-14 NOTE — PROGRESS NOTES
Chief Complaint   Patient presents with    3 Month Follow-Up       Blood pressure (!) 97/57, pulse 52, temperature 97.9 °F (36.6 °C), temperature source Oral, resp. rate 19, height 1.676 m (5' 6\"), weight 75.3 kg (166 lb), SpO2 98%.         Chest pain: Denies     Shortness of breath:  Denies     Edema: Denies     Palpitations, skipped beats, rapid heartbeat:  Denies     Dizziness: Denies     Fatigue: Denies      \"Have you been to the ER, urgent care clinic since your last visit?  Hospitalized since your last visit?\"    NO    “Have you seen or consulted any other health care providers outside of [facility] since your last visit?”    NO              
empagliflozin (JARDIANCE) 10 MG tablet, Take 1 tablet by mouth daily, Disp: 90 tablet, Rfl: 3    sacubitril-valsartan (ENTRESTO) 24-26 MG per tablet, Take 1 tablet by mouth 2 times daily, Disp: 180 tablet, Rfl: 3    vitamin B-12 (CYANOCOBALAMIN) 1000 MCG tablet, Take 1 tablet by mouth daily, Disp: , Rfl:     B Complex Vitamins (B COMPLEX PO), Take 1 tablet by mouth daily, Disp: , Rfl:     FREESTYLE LITE strip, As needed., Disp: 100 each, Rfl: 1    Multiple Vitamins-Minerals (MULTIVITAMIN ADULTS 50+ PO), Take 1 tablet by mouth daily, Disp: , Rfl:     aspirin 81 MG EC tablet, Take by mouth daily, Disp: , Rfl:     vitamin D 25 MCG (1000 UT) CAPS, Take by mouth daily, Disp: , Rfl:    Impression see above.

## 2025-06-11 ENCOUNTER — TELEPHONE (OUTPATIENT)
Age: 85
End: 2025-06-11

## 2025-06-11 DIAGNOSIS — I25.10 HEART FAILURE, SYSTOLIC, DUE TO CAD (HCC): ICD-10-CM

## 2025-06-11 DIAGNOSIS — I50.20 HEART FAILURE, SYSTOLIC, DUE TO CAD (HCC): ICD-10-CM

## 2025-06-12 RX ORDER — CARVEDILOL 3.12 MG/1
3.12 TABLET ORAL 2 TIMES DAILY WITH MEALS
Qty: 180 TABLET | Refills: 3 | Status: SHIPPED | OUTPATIENT
Start: 2025-06-12

## 2025-06-12 NOTE — TELEPHONE ENCOUNTER
Per VO by MD.    Future Appointments   Date Time Provider Department Center   6/16/2025  8:40 AM [facility]   7/2/2025  3:00 PM Roxy Walden APRN - NP [facility]   8/29/2025 11:20 AM Ray Mosher MD [facility]   9/12/2025  8:20 AM [facility]

## 2025-06-16 ENCOUNTER — OFFICE VISIT (OUTPATIENT)
Facility: CLINIC | Age: 85
End: 2025-06-16

## 2025-06-16 VITALS
WEIGHT: 165 LBS | OXYGEN SATURATION: 96 % | HEART RATE: 78 BPM | RESPIRATION RATE: 14 BRPM | TEMPERATURE: 97.4 F | BODY MASS INDEX: 26.52 KG/M2 | HEIGHT: 66 IN | DIASTOLIC BLOOD PRESSURE: 74 MMHG | SYSTOLIC BLOOD PRESSURE: 111 MMHG

## 2025-06-16 DIAGNOSIS — E11.3592 TYPE 2 DIABETES MELLITUS WITH LEFT EYE AFFECTED BY PROLIFERATIVE RETINOPATHY WITHOUT MACULAR EDEMA, WITHOUT LONG-TERM CURRENT USE OF INSULIN (HCC): ICD-10-CM

## 2025-06-16 DIAGNOSIS — E78.00 PURE HYPERCHOLESTEROLEMIA, UNSPECIFIED: Primary | ICD-10-CM

## 2025-06-16 DIAGNOSIS — Z00.00 MEDICARE ANNUAL WELLNESS VISIT, SUBSEQUENT: ICD-10-CM

## 2025-06-16 RX ORDER — EZETIMIBE 10 MG/1
10 TABLET ORAL DAILY
Qty: 90 TABLET | Refills: 1 | Status: SHIPPED | OUTPATIENT
Start: 2025-06-16

## 2025-06-16 RX ORDER — CHLORAL HYDRATE 500 MG
1000 CAPSULE ORAL 2 TIMES DAILY
Qty: 180 CAPSULE | Refills: 0
Start: 2025-06-16

## 2025-06-16 ASSESSMENT — PATIENT HEALTH QUESTIONNAIRE - PHQ9
6. FEELING BAD ABOUT YOURSELF - OR THAT YOU ARE A FAILURE OR HAVE LET YOURSELF OR YOUR FAMILY DOWN: NOT AT ALL
4. FEELING TIRED OR HAVING LITTLE ENERGY: NOT AT ALL
SUM OF ALL RESPONSES TO PHQ QUESTIONS 1-9: 0
8. MOVING OR SPEAKING SO SLOWLY THAT OTHER PEOPLE COULD HAVE NOTICED. OR THE OPPOSITE, BEING SO FIGETY OR RESTLESS THAT YOU HAVE BEEN MOVING AROUND A LOT MORE THAN USUAL: NOT AT ALL
SUM OF ALL RESPONSES TO PHQ QUESTIONS 1-9: 0
1. LITTLE INTEREST OR PLEASURE IN DOING THINGS: NOT AT ALL
10. IF YOU CHECKED OFF ANY PROBLEMS, HOW DIFFICULT HAVE THESE PROBLEMS MADE IT FOR YOU TO DO YOUR WORK, TAKE CARE OF THINGS AT HOME, OR GET ALONG WITH OTHER PEOPLE: NOT DIFFICULT AT ALL
5. POOR APPETITE OR OVEREATING: NOT AT ALL
SUM OF ALL RESPONSES TO PHQ QUESTIONS 1-9: 0
3. TROUBLE FALLING OR STAYING ASLEEP: NOT AT ALL
SUM OF ALL RESPONSES TO PHQ QUESTIONS 1-9: 0
2. FEELING DOWN, DEPRESSED OR HOPELESS: NOT AT ALL
7. TROUBLE CONCENTRATING ON THINGS, SUCH AS READING THE NEWSPAPER OR WATCHING TELEVISION: NOT AT ALL
9. THOUGHTS THAT YOU WOULD BE BETTER OFF DEAD, OR OF HURTING YOURSELF: NOT AT ALL

## 2025-06-16 NOTE — PROGRESS NOTES
Chief Complaint   Patient presents with    Follow-up Chronic Condition    Medicare AWV        \"Have you been to the ER, urgent care clinic since your last visit?  Hospitalized since your last visit?\"    NO    “Have you seen or consulted any other health care providers outside of [facility] since your last visit?”    [facility]             Click Here for Release of Records Request     Health Maintenance Due   Topic Date Due    Diabetic foot exam  03/22/2022    Annual Wellness Visit (Medicare)  04/10/2025    Diabetic retinal exam  07/16/2025

## 2025-07-02 ENCOUNTER — OFFICE VISIT (OUTPATIENT)
Age: 85
End: 2025-07-02
Payer: MEDICARE

## 2025-07-02 VITALS
BODY MASS INDEX: 26.2 KG/M2 | HEIGHT: 66 IN | HEART RATE: 62 BPM | WEIGHT: 163 LBS | OXYGEN SATURATION: 96 % | DIASTOLIC BLOOD PRESSURE: 68 MMHG | SYSTOLIC BLOOD PRESSURE: 112 MMHG | RESPIRATION RATE: 16 BRPM

## 2025-07-02 DIAGNOSIS — G70.00 MYASTHENIA GRAVIS WITHOUT (ACUTE) EXACERBATION (HCC): Primary | ICD-10-CM

## 2025-07-02 PROCEDURE — 1159F MED LIST DOCD IN RCRD: CPT

## 2025-07-02 PROCEDURE — 99214 OFFICE O/P EST MOD 30 MIN: CPT

## 2025-07-02 PROCEDURE — 1160F RVW MEDS BY RX/DR IN RCRD: CPT

## 2025-07-02 PROCEDURE — G8417 CALC BMI ABV UP PARAM F/U: HCPCS

## 2025-07-02 PROCEDURE — G8427 DOCREV CUR MEDS BY ELIG CLIN: HCPCS

## 2025-07-02 PROCEDURE — 1123F ACP DISCUSS/DSCN MKR DOCD: CPT

## 2025-07-02 PROCEDURE — 1036F TOBACCO NON-USER: CPT

## 2025-07-02 ASSESSMENT — PATIENT HEALTH QUESTIONNAIRE - PHQ9
SUM OF ALL RESPONSES TO PHQ QUESTIONS 1-9: 0
2. FEELING DOWN, DEPRESSED OR HOPELESS: NOT AT ALL
SUM OF ALL RESPONSES TO PHQ QUESTIONS 1-9: 0
1. LITTLE INTEREST OR PLEASURE IN DOING THINGS: NOT AT ALL

## 2025-07-02 ASSESSMENT — ENCOUNTER SYMPTOMS: PHOTOPHOBIA: 0

## 2025-07-02 ASSESSMENT — VISUAL ACUITY: VA_NORMAL: 1

## 2025-07-02 NOTE — PROGRESS NOTES
Neurology Clinic Follow up Note    Patient ID:   Stanley Sandy  1940  84 y.o. male  [id number]      Chief Complaint   Patient presents with    Follow-up     Follow up   MG/ptosis    No new concerns.   Needs a refill on pyridostigmine         Last Appointment With Me:    With Dr Carney, 5/15/2024  \" Mr. Stanley Sandy he developed rather abrupt ptosis of the left eye in February 2022 and was having some diplopia.    He was diagnosed with antibody-positive myasthenia gravis   He has responded quite well to pyridostigmine 60 mg 2 times daily  Fortunately he does not have any systemic symptoms and he is doing well for over 2 years now      MRI of the brain imaging is negative  CT chest did not show any thymoma but showed a cystic structure in the pancreas.  I have recommended MRI scan of the abdomen with contrast for further evaluation of this regard      He also has carpal tunnel syndrome bilaterally and has had carpal tunnel release on the right which did not provide any benefit.  He is not keen on pursuing any treatment for the left side and symptoms are not very bothersome     Continue annual follow-up.  Signs and symptoms of myasthenia gravis exacerbation were reviewed and he should seek immediate medical attention if he develops any \"    Interval History:   Pt denies recurrence of ptosis. No new dysphagia, diplopia, dysphasia, difficulty w/ eye movement, weakness. Pt recently had second cataract surgery and vision is much improved. He takes pyridostigmine 60 mg BID and tolerates it well. He met with Dr Henderson, General Surgery, after MRI of the abdomen showed a cystic lesion in the pancreas. Per pt, following this visit Dr Henderson presented his case at a conference, and advised there is no need for further imaging surveillance.     Past Medical History:   Diagnosis Date    Cardiomyopathy, dilated, nonischemic (HCC)     Colon polyps     Hypercholesterolemia     Intraductal papillary mucinous neoplasm, side branch

## 2025-07-31 DIAGNOSIS — E03.9 ACQUIRED HYPOTHYROIDISM: Primary | ICD-10-CM

## 2025-07-31 RX ORDER — LEVOTHYROXINE SODIUM 50 UG/1
50 TABLET ORAL DAILY
Qty: 90 TABLET | Refills: 0 | Status: SHIPPED | OUTPATIENT
Start: 2025-07-31

## 2025-08-29 ENCOUNTER — OFFICE VISIT (OUTPATIENT)
Age: 85
End: 2025-08-29
Payer: MEDICARE

## 2025-08-29 VITALS
HEIGHT: 66 IN | HEART RATE: 61 BPM | WEIGHT: 163 LBS | DIASTOLIC BLOOD PRESSURE: 60 MMHG | OXYGEN SATURATION: 97 % | BODY MASS INDEX: 26.2 KG/M2 | SYSTOLIC BLOOD PRESSURE: 110 MMHG

## 2025-08-29 DIAGNOSIS — I49.3 ASYMPTOMATIC PVCS: ICD-10-CM

## 2025-08-29 DIAGNOSIS — I25.10 CORONARY ARTERY DISEASE INVOLVING NATIVE CORONARY ARTERY OF NATIVE HEART WITHOUT ANGINA PECTORIS: ICD-10-CM

## 2025-08-29 DIAGNOSIS — I42.8 NICM (NONISCHEMIC CARDIOMYOPATHY) (HCC): ICD-10-CM

## 2025-08-29 DIAGNOSIS — I35.0 NONRHEUMATIC AORTIC VALVE STENOSIS: ICD-10-CM

## 2025-08-29 DIAGNOSIS — E78.00 HYPERCHOLESTEREMIA: ICD-10-CM

## 2025-08-29 DIAGNOSIS — I50.22 CHRONIC SYSTOLIC CONGESTIVE HEART FAILURE (HCC): Primary | ICD-10-CM

## 2025-08-29 DIAGNOSIS — Z78.9 STATIN INTOLERANCE: ICD-10-CM

## 2025-08-29 PROCEDURE — G2211 COMPLEX E/M VISIT ADD ON: HCPCS | Performed by: SPECIALIST

## 2025-08-29 PROCEDURE — 1036F TOBACCO NON-USER: CPT | Performed by: SPECIALIST

## 2025-08-29 PROCEDURE — 99214 OFFICE O/P EST MOD 30 MIN: CPT | Performed by: SPECIALIST

## 2025-08-29 PROCEDURE — 93005 ELECTROCARDIOGRAM TRACING: CPT | Performed by: SPECIALIST

## 2025-08-29 PROCEDURE — 1123F ACP DISCUSS/DSCN MKR DOCD: CPT | Performed by: SPECIALIST

## 2025-08-29 PROCEDURE — 93010 ELECTROCARDIOGRAM REPORT: CPT | Performed by: SPECIALIST

## 2025-08-29 PROCEDURE — G8428 CUR MEDS NOT DOCUMENT: HCPCS | Performed by: SPECIALIST

## 2025-08-29 PROCEDURE — G8417 CALC BMI ABV UP PARAM F/U: HCPCS | Performed by: SPECIALIST

## (undated) DEVICE — TR BAND RADIAL ARTERY COMPRESSION DEVICE: Brand: TR BAND

## (undated) DEVICE — GLIDESHEATH SLENDER ACCESS KIT: Brand: GLIDESHEATH SLENDER

## (undated) DEVICE — PINNACLE INTRODUCER SHEATH: Brand: PINNACLE

## (undated) DEVICE — CATHETER DIAG 6FR L100CM LUMN ID0.056IN JR4 CRV 0 SIDE H

## (undated) DEVICE — PINNACLE PRECISION ACCESS SYSTEM INTRODUCER SHEATH: Brand: PINNACLE PRECISION ACCESS SYSTEM

## (undated) DEVICE — SUPPORT WRST AD W3.5XL9IN DIA14.5IN ART SFT ADJ HK AND LOOP

## (undated) DEVICE — MEDI-TRACE CADENCE ADULT, DEFIBRILLATION ELECTRODE -RTS  (10 PR/PK) - PHYSIO-CONTROL: Brand: MEDI-TRACE CADENCE

## (undated) DEVICE — CATHETER GUID 6FR L100CM DIA0.071IN NYL SHFT EBU4.0 W/O

## (undated) DEVICE — MICROPUNCTURE INTRODUCER SET SILHOUETTE TRANSITIONLESS WITH STAINLESS STEEL WIRE GUIDE: Brand: MICROPUNCTURE

## (undated) DEVICE — BANDAGE HEMSTAT W38XL38IN IMPREG KAOLIN SFT NONWOVEN PRESLIT

## (undated) DEVICE — ANGIOGRAPHIC CATHETER: Brand: IMPULSE™

## (undated) DEVICE — ROSEN CURVED WIRE GUIDE: Brand: ROSEN

## (undated) DEVICE — PINNACLE R/O II INTRODUCER SHEATH WITH RADIOPAQUE MARKER: Brand: PINNACLE

## (undated) DEVICE — Device

## (undated) DEVICE — HEART CATH-SFMC: Brand: MEDLINE INDUSTRIES, INC.

## (undated) DEVICE — COVER US PRB W15XL120CM W/ GEL RUBBERBAND TAPE STRP FLD GEN

## (undated) DEVICE — SPECIAL PROCEDURE DRAPE 32" X 34": Brand: SPECIAL PROCEDURE DRAPE

## (undated) DEVICE — CATHETER ART THERMODILUTION 6 FRX110 CM 4 LUMEN SWAN

## (undated) DEVICE — HI-TORQUE VERSACORE MODIFIED J GUIDE WIRE SYSTEM 145 CM: Brand: HI-TORQUE VERSACORE